# Patient Record
Sex: FEMALE | Race: WHITE | NOT HISPANIC OR LATINO | Employment: OTHER | URBAN - METROPOLITAN AREA
[De-identification: names, ages, dates, MRNs, and addresses within clinical notes are randomized per-mention and may not be internally consistent; named-entity substitution may affect disease eponyms.]

---

## 2021-08-18 LAB
LEFT EYE DIABETIC RETINOPATHY: NORMAL
RIGHT EYE DIABETIC RETINOPATHY: NORMAL

## 2022-03-24 ENCOUNTER — OFFICE VISIT (OUTPATIENT)
Dept: FAMILY MEDICINE CLINIC | Facility: CLINIC | Age: 62
End: 2022-03-24
Payer: COMMERCIAL

## 2022-03-24 VITALS
OXYGEN SATURATION: 98 % | SYSTOLIC BLOOD PRESSURE: 110 MMHG | BODY MASS INDEX: 28.21 KG/M2 | TEMPERATURE: 97.7 F | DIASTOLIC BLOOD PRESSURE: 76 MMHG | HEIGHT: 63 IN | WEIGHT: 159.2 LBS | HEART RATE: 101 BPM | RESPIRATION RATE: 16 BRPM

## 2022-03-24 DIAGNOSIS — E11.42 TYPE 2 DIABETES MELLITUS WITH DIABETIC POLYNEUROPATHY, WITHOUT LONG-TERM CURRENT USE OF INSULIN (HCC): ICD-10-CM

## 2022-03-24 DIAGNOSIS — Z00.00 ENCOUNTER FOR WELL ADULT EXAM WITHOUT ABNORMAL FINDINGS: Primary | ICD-10-CM

## 2022-03-24 PROBLEM — Z84.89: Status: ACTIVE | Noted: 2022-03-24

## 2022-03-24 PROBLEM — Z01.818 PRE-OP TESTING: Status: ACTIVE | Noted: 2021-12-20

## 2022-03-24 PROBLEM — K31.84 GASTROPARESIS: Status: ACTIVE | Noted: 2021-12-20

## 2022-03-24 PROBLEM — M48.061 LUMBAR STENOSIS: Status: ACTIVE | Noted: 2021-12-29

## 2022-03-24 PROBLEM — E11.9 DIABETES MELLITUS (HCC): Status: ACTIVE | Noted: 2022-03-24

## 2022-03-24 PROBLEM — Z87.898 HISTORY OF SEIZURE: Status: ACTIVE | Noted: 2022-03-24

## 2022-03-24 PROBLEM — I10 HYPERTENSION: Status: ACTIVE | Noted: 2022-03-24

## 2022-03-24 PROBLEM — E78.5 HYPERLIPIDEMIA: Status: ACTIVE | Noted: 2022-03-24

## 2022-03-24 PROBLEM — M96.1 CERVICAL POST-LAMINECTOMY SYNDROME: Status: ACTIVE | Noted: 2021-12-20

## 2022-03-24 PROBLEM — J45.909 ASTHMA: Status: ACTIVE | Noted: 2022-03-24

## 2022-03-24 PROCEDURE — 3008F BODY MASS INDEX DOCD: CPT | Performed by: FAMILY MEDICINE

## 2022-03-24 PROCEDURE — 3725F SCREEN DEPRESSION PERFORMED: CPT | Performed by: FAMILY MEDICINE

## 2022-03-24 PROCEDURE — 99386 PREV VISIT NEW AGE 40-64: CPT | Performed by: FAMILY MEDICINE

## 2022-03-24 PROCEDURE — 1036F TOBACCO NON-USER: CPT | Performed by: FAMILY MEDICINE

## 2022-03-24 RX ORDER — OMEPRAZOLE 40 MG/1
CAPSULE, DELAYED RELEASE ORAL
COMMUNITY
Start: 2022-03-03

## 2022-03-24 RX ORDER — GABAPENTIN 300 MG/1
300 CAPSULE ORAL 3 TIMES DAILY
COMMUNITY

## 2022-03-24 RX ORDER — EPINEPHRINE 0.3 MG/.3ML
1 INJECTION SUBCUTANEOUS
COMMUNITY

## 2022-03-24 RX ORDER — LISINOPRIL 20 MG/1
20 TABLET ORAL DAILY
COMMUNITY
Start: 2021-11-12

## 2022-03-24 RX ORDER — ATORVASTATIN CALCIUM 40 MG/1
40 TABLET, FILM COATED ORAL DAILY
COMMUNITY
Start: 2022-03-17

## 2022-03-24 RX ORDER — TIZANIDINE 2 MG/1
2 TABLET ORAL DAILY
COMMUNITY
Start: 2021-11-29

## 2022-03-24 RX ORDER — SITAGLIPTIN 100 MG/1
100 TABLET, FILM COATED ORAL DAILY
COMMUNITY
Start: 2022-02-08 | End: 2022-06-24 | Stop reason: SDUPTHER

## 2022-03-24 RX ORDER — GLIMEPIRIDE 4 MG/1
4 TABLET ORAL 2 TIMES DAILY
COMMUNITY
End: 2022-06-24 | Stop reason: SDUPTHER

## 2022-03-24 RX ORDER — ALBUTEROL SULFATE 90 UG/1
AEROSOL, METERED RESPIRATORY (INHALATION)
COMMUNITY

## 2022-03-24 RX ORDER — CELECOXIB 200 MG/1
200 CAPSULE ORAL DAILY
COMMUNITY
Start: 2022-03-17

## 2022-03-24 RX ORDER — ASPIRIN 81 MG/1
TABLET ORAL
COMMUNITY

## 2022-03-24 NOTE — PROGRESS NOTES
Assessment/Plan:     Diagnoses and all orders for this visit:    Encounter for well adult exam without abnormal findings    Type 2 diabetes mellitus with diabetic polyneuropathy, without long-term current use of insulin ([facility]  -     Ambulatory Referral to Endocrinology; Future    Other orders  -     Multiple Vitamin (MULTIVITAMIN ADULT PO); Take 1 tablet by mouth daily  -     Rhubarb (ESTROVEN MENOPAUSE RELIEF PO); Take 1 tablet by mouth daily  -     TURMERIC PO; Take 500 mg by mouth daily  -     albuterol (PROVENTIL HFA,VENTOLIN HFA) 90 mcg/act inhaler; albuterol sulfate HFA 90 mcg/actuation aerosol inhaler  -     aspirin (ECOTRIN LOW STRENGTH) 81 mg EC tablet; Aspir-Low 81 mg tablet,delayed release   Take 1 tablet every day by oral route  -     atorvastatin (LIPITOR) 40 mg tablet; Take 40 mg by mouth daily    -     celecoxib (CeleBREX) 200 mg capsule; Take 200 mg by mouth daily Take with food  -     Dapagliflozin Propanediol (Farxiga) 10 MG TABS; Take 10 mg by mouth daily  -     EPINEPHrine (EPIPEN) 0 3 mg/0 3 mL SOAJ; 1 Syringe  -     gabapentin (NEURONTIN) 300 mg capsule; Take 300 mg by mouth 3 (three) times a day    -     glimepiride (AMARYL) 4 mg tablet; glimepiride 4 mg tablet  -     lisinopril (ZESTRIL) 20 mg tablet; Take 20 mg by mouth daily  -     metFORMIN (GLUCOPHAGE) 1000 MG tablet; Take 1,000 mg by mouth 2 (two) times a day with meals    -     omeprazole (PriLOSEC) 40 MG capsule  -     Januvia 100 MG tablet; Take 100 mg by mouth daily  -     tiZANidine (ZANAFLEX) 2 mg tablet; Take 2 mg by mouth daily      patient referred endocrinology   She will follow-up with other specialist   Will need to call [facility] to get her blood work she will continue her current medications  She can follow up in 6 months or sooner if needed      Subjective:     Chief Complaint   Patient presents with   [facility] patient check up        Patient ID: Víctor Hernandez is a 59 y o  female      HPI    The following portions of the patient's history were reviewed and updated as appropriate: allergies, current medications, past family history, past medical history, past social history, past surgical history and problem list     Review of Systems   Constitutional: Negative  HENT: Negative  Eyes: Negative  Respiratory: Negative  Cardiovascular: Negative  Gastrointestinal: Negative  Endocrine: Negative  Genitourinary: Negative  Musculoskeletal: Negative  Skin: Negative  Allergic/Immunologic: Negative  Neurological: Negative  Hematological: Negative  Psychiatric/Behavioral: Negative  All other systems reviewed and are negative  Objective:    Vitals:    03/24/22 1444   BP: 110/76   BP Location: Left arm   Patient Position: Sitting   Cuff Size: Large   Pulse: 101   Resp: 16   Temp: 97 7 °F (36 5 °C)   TempSrc: Tympanic   SpO2: 98%   Weight: 72 2 kg (159 lb 3 2 oz)   Height: 5' 3" (1 6 m)          Physical Exam  Vitals and nursing note reviewed  Constitutional:       Appearance: She is well-developed  HENT:      Head: Normocephalic and atraumatic  Right Ear: External ear normal       Left Ear: External ear normal    Eyes:      Conjunctiva/sclera: Conjunctivae normal       Pupils: Pupils are equal, round, and reactive to light  Cardiovascular:      Rate and Rhythm: Normal rate and regular rhythm  Pulses: no weak pulses          Dorsalis pedis pulses are 2+ on the right side and 2+ on the left side  Posterior tibial pulses are 2+ on the right side and 2+ on the left side  Heart sounds: Normal heart sounds  Pulmonary:      Effort: Pulmonary effort is normal       Breath sounds: Normal breath sounds  Abdominal:      General: Bowel sounds are normal       Palpations: Abdomen is soft  Musculoskeletal:         General: Normal range of motion  Cervical back: Normal range of motion     Feet:      Right foot:      Skin integrity: No ulcer, skin breakdown, erythema, warmth, callus or dry skin  Left foot:      Skin integrity: No ulcer, skin breakdown, erythema, warmth, callus or dry skin  Skin:     General: Skin is warm and dry  Neurological:      Mental Status: She is alert and oriented to person, place, and time  Deep Tendon Reflexes: Reflexes are normal and symmetric  Psychiatric:         Behavior: Behavior normal          Thought Content: Thought content normal          Judgment: Judgment normal        Diabetic Foot Exam    Patient's shoes and socks removed  Right Foot/Ankle   Right Foot Inspection  Skin Exam: skin normal  Skin not intact, no dry skin, no warmth, no callus, no erythema, no maceration, no abnormal color, no pre-ulcer, no ulcer and no callus  Toe Exam: ROM and strength within normal limits  Sensory   Vibration: intact  Proprioception: intact  Monofilament testing: intact    Vascular  Capillary refills: < 3 seconds  The right DP pulse is 2+  The right PT pulse is 2+  Left Foot/Ankle  Left Foot Inspection  Skin Exam: skin normal  Skin not intact, no dry skin, no warmth, no erythema, no maceration, normal color, no pre-ulcer, no ulcer and no callus  Toe Exam: ROM and strength within normal limits  Sensory   Vibration: intact  Proprioception: intact  Monofilament testing: intact    Vascular  Capillary refills: < 3 seconds  The left DP pulse is 2+  The left PT pulse is 2+  Assign Risk Category  No deformity present  No loss of protective sensation  No weak pulses  Risk: 0    BMI Counseling: Body mass index is 28 2 kg/m²   The BMI is above normal  Nutrition recommendations include reducing portion sizes, decreasing overall calorie intake, 3-5 servings of fruits/vegetables daily, reducing fast food intake, consuming healthier snacks, decreasing soda and/or juice intake, moderation in carbohydrate intake, increasing intake of lean protein, reducing intake of saturated fat and trans fat and reducing intake of cholesterol  Exercise recommendations include exercising 3-5 times per week

## 2022-03-24 NOTE — PATIENT INSTRUCTIONS
Low Fat Diet   AMBULATORY CARE:   A low-fat diet  is an eating plan that is low in total fat, unhealthy fat, and cholesterol  You may need to follow a low-fat diet if you have trouble digesting or absorbing fat  You may also need to follow this diet if you have high cholesterol  You can also lower your cholesterol by increasing the amount of fiber in your diet  Soluble fiber is a type of fiber that helps to decrease cholesterol levels  Different types of fat in food:   · Limit unhealthy fats  A diet that is high in cholesterol, saturated fat, and trans fat may cause unhealthy cholesterol levels  Unhealthy cholesterol levels increase your risk of heart disease  ? Cholesterol:  Limit intake of cholesterol to less than 200 mg per day  Cholesterol is found in meat, eggs, and dairy  ? Saturated fat:  Limit saturated fat to less than 7% of your total daily calories  Ask your dietitian how many calories you need each day  Saturated fat is found in butter, cheese, ice cream, whole milk, and palm oil  Saturated fat is also found in meat, such as beef, pork, chicken skin, and processed meats  Processed meats include sausage, hot dogs, and bologna  ? Trans fat:  Avoid trans fat as much as possible  Trans fat is used in fried and baked foods  Foods that say trans fat free on the label may still have up to 0 5 grams of trans fat per serving  · Include healthy fats  Replace foods that are high in saturated and trans fat with foods high in healthy fats  This may help to decrease high cholesterol levels  ? Monounsaturated fats: These are found in avocados, nuts, and vegetable oils, such as olive, canola, and sunflower oil  ? Polyunsaturated fats: These can be found in vegetable oils, such as soybean or corn oil  Omega-3 fats can help to decrease the risk of heart disease  Omega-3 fats are found in fish, such as salmon, herring, trout, and tuna   Omega-3 fats can also be found in plant foods, such as walnuts, flaxseed, soybeans, and canola oil  Foods to limit or avoid:   · Grains:      ? Snacks that are made with partially hydrogenated oils, such as chips, regular crackers, and butter-flavored popcorn    ? High-fat baked goods, such as biscuits, croissants, doughnuts, pies, cookies, and pastries    · Dairy:      ? Whole milk, 2% milk, and yogurt and ice cream made with whole milk    ? Half and half creamer, heavy cream, and whipping cream    ? Cheese, cream cheese, and sour cream    · Meats and proteins:      ? High-fat cuts of meat (T-bone steak, regular hamburger, and ribs)    ? Fried meat, poultry (turkey and chicken), and fish    ? Poultry (chicken and turkey) with skin    ? Cold cuts (salami or bologna), hot dogs, mayfield, and sausage    ? Whole eggs and egg yolks    · Vegetables and fruits with added fat:      ? Fried vegetables or vegetables in butter or high-fat sauces, such as cream or cheese sauces    ? Fried fruit or fruit served with butter or cream    · Fats:      ? Butter, stick margarine, and shortening    ? Coconut, palm oil, and palm kernel oil    Foods to include:   · Grains:      ? Whole-grain breads, cereals, pasta, and brown rice    ? Low-fat crackers and pretzels    · Vegetables and fruits:      ? Fresh, frozen, or canned vegetables (no salt or low-sodium)    ? Fresh, frozen, dried, or canned fruit (canned in light syrup or fruit juice)    ? Avocado    · Low-fat dairy products:      ? Nonfat (skim) or 1% milk    ? Nonfat or low-fat cheese, yogurt, and cottage cheese    · Meats and proteins:      ? Chicken or turkey with no skin    ? Baked or broiled fish    ? Lean beef and pork (loin, round, extra lean hamburger)    ? Beans and peas, unsalted nuts, soy products    ? Egg whites and substitutes    ? Seeds and nuts    · Fats:      ? Unsaturated oil, such as canola, olive, peanut, soybean, or sunflower oil    ? Soft or liquid margarine and vegetable oil spread    ?  Low-fat salad dressing    Other ways to decrease fat:   · Read food labels before you buy foods  Choose foods that have less than 30% of calories from fat  Choose low-fat or fat-free dairy products  Remember that fat free does not mean calorie free  These foods still contain calories, and too many calories can lead to weight gain  · Trim fat from meat and avoid fried food  Trim all visible fat from meat before you cook it  Remove the skin from poultry  Do not kate meat, fish, or poultry  Bake, roast, boil, or broil these foods instead  Avoid fried foods  Eat a baked potato instead of Western Radha fries  Steam vegetables instead of sautéing them in butter  · Add less fat to foods  Use imitation mayfield bits on salads and baked potatoes instead of regular mayfield bits  Use fat-free or low-fat salad dressings instead of regular dressings  Use low-fat or nonfat butter-flavored topping instead of regular butter or margarine on popcorn and other foods  Ways to decrease fat in recipes:  Replace high-fat ingredients with low-fat or nonfat ones  This may cause baked goods to be drier than usual  You may need to use nonfat cooking spray on pans to prevent food from sticking  You also may need to change the amount of other ingredients, such as water, in the recipe  Try the following:  · Use low-fat or light margarine instead of regular margarine or shortening  · Use lean ground turkey breast or chicken, or lean ground beef (less than 5% fat) instead of hamburger  · Add 1 teaspoon of canola oil to 8 ounces of skim milk instead of using cream or half and half  · Use grated zucchini, carrots, or apples in breads instead of coconut  · Use blenderized, low-fat cottage cheese, plain tofu, or low-fat ricotta cheese instead of cream cheese  · Use 1 egg white and 1 teaspoon of canola oil, or use ¼ cup (2 ounces) of fat-free egg substitute instead of a whole egg       · Replace half of the oil that is called for in a recipe with applesauce when you bake  Use 3 tablespoons of cocoa powder and 1 tablespoon of canola oil instead of a square of baking chocolate  How to increase fiber:  Eat enough high-fiber foods to get 20 to 30 grams of fiber every day  Slowly increase your fiber intake to avoid stomach cramps, gas, and other problems  · Eat 3 ounces of whole-grain foods each day  An ounce is about 1 slice of bread  Eat whole-grain breads, such as whole-wheat bread  Whole wheat, whole-wheat flour, or other whole grains should be listed as the first ingredient on the food label  Replace white flour with whole-grain flour or use half of each in recipes  Whole-grain flour is heavier than white flour, so you may have to add more yeast or baking powder  · Eat a high-fiber cereal for breakfast   Oatmeal is a good source of soluble fiber  Look for cereals that have bran or fiber in the name  Choose whole-grain products, such as brown rice, barley, and whole-wheat pasta  · Eat more beans, peas, and lentils  For example, add beans to soups or salads  Eat at least 5 cups of fruits and vegetables each day  Eat fruits and vegetables with the peel because the peel is high in fiber  © Copyright [employer] 2022 Information is for End User's use only and may not be sold, redistributed or otherwise used for commercial purposes  All illustrations and images included in CareNotes® are the copyrighted property of A D A M , Inc  or [facility] Gilberto Dacosta   The above information is an  only  It is not intended as medical advice for individual conditions or treatments  Talk to your doctor, nurse or pharmacist before following any medical regimen to see if it is safe and effective for you

## 2022-03-25 ENCOUNTER — TELEPHONE (OUTPATIENT)
Dept: ADMINISTRATIVE | Facility: OTHER | Age: 62
End: 2022-03-25

## 2022-03-25 NOTE — TELEPHONE ENCOUNTER
----- Message from Amarilis Sandoval DO sent at 3/24/2022  2:53 PM EDT -----  Regarding: screenings  New patient     [facility]   Either [place] or [facility] -- near [place]   Dr Victor Hugo villalobos or ean

## 2022-03-25 NOTE — TELEPHONE ENCOUNTER
Upon review of the In Basket request we were able to locate, review, and update the patient chart as requested for Diabetic Eye Exam     Any additional questions or concerns should be emailed to the Practice Liaisons via [email or web address] email, please do not reply via In Basket      Thank you  Christina Dunne

## 2022-03-25 NOTE — LETTER
Procedure Request Form: Mammogram      Date Requested: 22  Patient: Jo Pack  Patient : 1960   Referring Provider: Jean Carlos Aranae, DO        Date of Procedure ______________________________       The above patient has informed us that they have completed their   most recent Mammogram at your facility  Please complete   this form and attach all corresponding procedure reports/results  Comments __________________________________________________________  ____________________________________________________________________  ____________________________________________________________________  ____________________________________________________________________    Facility Completing Procedure _________________________________________    Form Completed By (print name) _______________________________________      Signature __________________________________________________________      These reports are needed for  compliance  Please fax this completed form and a copy of the procedure report to our office located at [place] [address] as soon as possible to [phone] attention Daisy Tay: Phone [phone]    We thank you for your assistance in treating our mutual patient

## 2022-03-25 NOTE — TELEPHONE ENCOUNTER
Upon review of the In Basket request and the patient's chart, initial outreach has been made via telephone call and fax, please see Contacts section for details       Thank you  Errol So

## 2022-03-25 NOTE — TELEPHONE ENCOUNTER
----- Message from Tsering Shoemaker DO sent at 3/24/2022  2:58 PM EDT -----  Regarding: dm eye  Eye to eye  In [place] is eye doctor   And patient states up to date  Was there end of last year

## 2022-03-25 NOTE — LETTER
Procedure Request Form: Colonoscopy      Date Requested: 22  Patient: Ania Wakefieldk  Patient : 1960   Referring Provider: Sherwin Grier, DO        Date of Procedure ______________________________       The above patient has informed us that they have completed their   most recent Colonoscopy at your facility  Please complete   this form and attach all corresponding procedure reports/results  Comments __________________________________________________________  ____________________________________________________________________  ____________________________________________________________________  ____________________________________________________________________    Facility Completing Procedure _________________________________________    Form Completed By (print name) _______________________________________      Signature __________________________________________________________      These reports are needed for  compliance  Please fax this completed form and a copy of the procedure report to our office located at [place] [address] as soon as possible to [phone] stacy Lawson: Phone [phone]    We thank you for your assistance in treating our mutual patient

## 2022-03-28 NOTE — TELEPHONE ENCOUNTER
Upon review of the In Basket request we were able to locate, review, and update the patient chart as requested for Mammogram     Any additional questions or concerns should be emailed to the Practice Liaisons via [email or web address] email, please do not reply via In Basket      Thank you  Abebe Londono

## 2022-03-29 NOTE — TELEPHONE ENCOUNTER
Upon review of the In Basket request we were able to locate, review, and update the patient chart as requested for CRC: Colonoscopy  Any additional questions or concerns should be emailed to the Practice Liaisons via [email or web address] email, please do not reply via In Basket      Thank you  Lexie Kearns

## 2022-03-29 NOTE — TELEPHONE ENCOUNTER
As a follow-up, a second attempt has been made for outreach via fax, please see Contacts section for details      Thank you  Will Yuan

## 2022-04-25 ENCOUNTER — OFFICE VISIT (OUTPATIENT)
Dept: URGENT CARE | Facility: CLINIC | Age: 62
End: 2022-04-25
Payer: COMMERCIAL

## 2022-04-25 VITALS
TEMPERATURE: 97.9 F | OXYGEN SATURATION: 99 % | HEART RATE: 100 BPM | BODY MASS INDEX: 27.64 KG/M2 | RESPIRATION RATE: 18 BRPM | WEIGHT: 156 LBS | HEIGHT: 63 IN

## 2022-04-25 DIAGNOSIS — J20.9 ACUTE BRONCHITIS, UNSPECIFIED ORGANISM: ICD-10-CM

## 2022-04-25 DIAGNOSIS — J01.90 ACUTE NON-RECURRENT SINUSITIS, UNSPECIFIED LOCATION: Primary | ICD-10-CM

## 2022-04-25 PROCEDURE — G0382 LEV 3 HOSP TYPE B ED VISIT: HCPCS | Performed by: PHYSICIAN ASSISTANT

## 2022-04-25 RX ORDER — BENZONATATE 200 MG/1
200 CAPSULE ORAL 3 TIMES DAILY PRN
Qty: 20 CAPSULE | Refills: 0 | Status: SHIPPED | OUTPATIENT
Start: 2022-04-25 | End: 2022-06-24

## 2022-04-25 RX ORDER — DOXYCYCLINE 100 MG/1
100 CAPSULE ORAL 2 TIMES DAILY
Qty: 14 CAPSULE | Refills: 0 | Status: SHIPPED | OUTPATIENT
Start: 2022-04-25 | End: 2022-05-02

## 2022-04-25 RX ORDER — FLUTICASONE PROPIONATE 50 MCG
2 SPRAY, SUSPENSION (ML) NASAL DAILY
Qty: 16 G | Refills: 0 | Status: SHIPPED | OUTPATIENT
Start: 2022-04-25 | End: 2022-06-24

## 2022-04-25 NOTE — PATIENT INSTRUCTIONS
Sinusitis, Ambulatory Care   GENERAL INFORMATION:   Sinusitis  is inflammation or infection of your sinuses  It is most often caused by a virus  Acute sinusitis may last up to 12 weeks  Chronic sinusitis lasts longer than 12 weeks  Recurrent sinusitis is when you have 3 or more episodes of sinusitis in 1 year  Common symptoms include the following:   · Fever    · Pain, pressure, redness, or swelling around the forehead, cheeks, or eyes    · Thick yellow or green discharge from your nose    · Tenderness when you touch your face over your sinuses    · Dry cough that happens mostly at night or when you lie down    · Headache and face pain that is worse when you lean forward    · Teeth pain or pain when you chew  Seek immediate care for the following symptoms:   · Vision changes such as double vision    · Confusion or trouble thinking clearly    · Headache and stiff neck    · Trouble breathing  Treatment for sinusitis  may include medicines to relieve nasal and sinus congestion or to decrease pain and fever  Ask your healthcare provider which medicines you should take and how much is safe  Manage sinusitis:   · Drink liquids as directed  Ask your healthcare provider how much liquid to drink each day and which liquids are best for you  Liquids will help loosen and drain the mucus in your sinuses  · Breathe in steam   Heat a bowl of water until you see steam  Lean over the bowl and make a tent over your head with a large towel  Breathe deeply for about 20 minutes  Be careful not to get too close to the steam or burn yourself  Do this 3 times a day  You can also breathe deeply when you take a hot shower  · Rinse your sinuses  Use a sinus rinse device to rinse your nasal passages with a saline (salt water) solution  This will help thin the mucus in your nose and rinse away pollen and dirt  It will also help reduce swelling so you can breathe normally  Ask how often to do this       · Use heat on your sinuses  to decrease pain  Apply heat for 15 to 20 minutes every hour for as many days as directed  · Sleep with your head elevated  Place an extra pillow under your head before you go to sleep to help your sinuses drain  · Do not smoke and avoid secondhand smoke  If you smoke, it is never too late to quit  Ask for information about how to stop smoking if you need help  Prevent the spread of germs that cause sinusitis:  Wash your hands often with soap and water  Wash your hands after you use the bathroom, change a child's diaper, or sneeze  Wash your hands before you prepare or eat food  Follow up with your healthcare provider as directed:  Write down your questions so you remember to ask them during your visits  CARE AGREEMENT:   You have the right to help plan your care  Learn about your health condition and how it may be treated  Discuss treatment options with your caregivers to decide what care you want to receive  You always have the right to refuse treatment  The above information is an  only  It is not intended as medical advice for individual conditions or treatments  Talk to your doctor, nurse or pharmacist before following any medical regimen to see if it is safe and effective for you  © 2014 [address] is for End User's use only and may not be sold, redistributed or otherwise used for commercial purposes  All illustrations and images included in CareNotes® are the copyrighted property of A D A M , Inc  or Moi Chaudhary  Acute Bronchitis   WHAT YOU NEED TO KNOW:   Acute bronchitis is swelling and irritation in your lungs  It is usually caused by a virus and most often happens in the winter  Bronchitis may also be caused by bacteria or by a chemical irritant, such as smoke  DISCHARGE INSTRUCTIONS:   Return to the emergency department if:   · You cough up blood  · Your lips or fingernails turn blue      · You feel like you are not getting enough air when you breathe  Call your doctor if:   · Your symptoms do not go away or get worse, even after treatment  · Your cough does not get better within 4 weeks  · You have questions or concerns about your condition or care  Medicines: You may  need any of the following:  · Cough suppressants  decrease your urge to cough  · Decongestants  help loosen mucus in your lungs and make it easier to cough up  This can help you breathe easier  · Inhalers  may be given  Your healthcare provider may give you one or more inhalers to help you breathe easier and cough less  An inhaler gives your medicine to open your airways  Ask your healthcare provider to show you how to use your inhaler correctly  · Antibiotics  may be given for up to 5 days if your bronchitis is caused by bacteria  · Acetaminophen  decreases pain and fever  It is available without a doctor's order  Ask how much to take and how often to take it  Follow directions  Read the labels of all other medicines you are using to see if they also contain acetaminophen, or ask your doctor or pharmacist  Acetaminophen can cause liver damage if not taken correctly  Do not use more than 4 grams (4,000 milligrams) total of acetaminophen in one day  · NSAIDs  help decrease swelling and pain or fever  This medicine is available with or without a doctor's order  NSAIDs can cause stomach bleeding or kidney problems in certain people  If you take blood thinner medicine, always ask your healthcare provider if NSAIDs are safe for you  Always read the medicine label and follow directions  · Take your medicine as directed  Contact your healthcare provider if you think your medicine is not helping or if you have side effects  Tell him of her if you are allergic to any medicine  Keep a list of the medicines, vitamins, and herbs you take  Include the amounts, and when and why you take them   Bring the list or the pill bottles to follow-up visits  Carry your medicine list with you in case of an emergency  Self-care:   · Drink liquids as directed  You may need to drink more liquids than usual to stay hydrated  Ask how much liquid to drink each day and which liquids are best for you  · Use a cool mist humidifier  to increase air moisture in your home  This may make it easier for you to breathe and help decrease your cough  · Get more rest   Rest helps your body to heal  Slowly start to do more each day  Rest when you feel it is needed  · Avoid irritants in the air  Avoid chemicals, fumes, and dust  Wear a face mask if you must work around dust or fumes  Stay inside on days when air pollution levels are high  If you have allergies, stay inside when pollen counts are high  Do not use aerosol products, such as spray-on deodorant, bug spray, and hair spray  · Do not smoke or be around others who are smoking  Nicotine and other chemicals in cigarettes and cigars can cause lung damage  Ask your healthcare provider for information if you currently smoke and need help to quit  E-cigarettes or smokeless tobacco still contain nicotine  Talk to your healthcare provider before you use these products  Prevent acute bronchitis:       · Ask about vaccines you may need  Get a flu vaccine each year as soon as recommended, usually in September or October  Ask your healthcare provider if you should also get a pneumonia or COVID-19 vaccine  Your healthcare provider can tell you if you should also get other vaccines, and when to get them  · Prevent the spread of germs  You can decrease your risk for acute bronchitis and other illnesses by doing the following:     ? Wash your hands often with soap and water  Carry germ-killing hand lotion or gel with you  You can use the lotion or gel to clean your hands when soap and water are not available  ? Do not touch your eyes, nose, or mouth unless you have washed your hands first     ?  Always cover your mouth when you cough to prevent the spread of germs  It is best to cough into a tissue or your shirt sleeve instead of into your hand  Ask those around you to cover their mouths when they cough  ? Try to avoid people who have a cold or the flu  If you are sick, stay away from others as much as possible  Follow up with your doctor as directed:  Write down questions you have so you will remember to ask them during your follow-up visits  © Copyright [employer] 2022 Information is for End User's use only and may not be sold, redistributed or otherwise used for commercial purposes  All illustrations and images included in CareNotes® are the copyrighted property of A D A M , Inc  or [facility] Gilberto Dacosta   The above information is an  only  It is not intended as medical advice for individual conditions or treatments  Talk to your doctor, nurse or pharmacist before following any medical regimen to see if it is safe and effective for you

## 2022-05-08 NOTE — PROGRESS NOTES
[facility]        NAME: Eliseo Danielson is a 57 y o  female  : 1960    MRN: [id number]  DATE:  2022  TIME: 8:59 AM    Assessment and Plan   Acute non-recurrent sinusitis, unspecified location [J01 90]  1  Acute non-recurrent sinusitis, unspecified location  doxycycline monohydrate (MONODOX) 100 mg capsule    fluticasone (FLONASE) 50 mcg/act nasal spray   2  Acute bronchitis, unspecified organism  doxycycline monohydrate (MONODOX) 100 mg capsule    benzonatate (TESSALON) 200 MG capsule         Patient Instructions     Patient has sinusitis and bronchitis which I will treat with a combination of doxycycline, Tessalon Perles, and Flonase and recommend hydration, rest, discussed OTC cough and cold meds, close observation  Follow up with PCP in 3-5 days  Proceed to  ER if symptoms worsen  Chief Complaint     Chief Complaint   Patient presents with    Cold Like Symptoms     Pt reports cold like symptom, productive cough and sore throat for approx 2 weeks  History of Present Illness       Patient presents with 2 week history of nasal congestion, PND, productive cough, sore throat, chest congestion  Denies fever, chills, shortness breath or wheezing, N/V/D, or recent no direct cold exposure  Has been managing symptoms with multiple over-the-counter preparations without relief  Review of Systems   Review of Systems   Constitutional: Negative  HENT: Positive for congestion, postnasal drip and sore throat  Respiratory: Positive for cough  Negative for shortness of breath and wheezing  Cardiovascular: Negative  Gastrointestinal: Negative  Genitourinary: Negative            Current Medications       Current Outpatient Medications:     albuterol (PROVENTIL HFA,VENTOLIN HFA) 90 mcg/act inhaler, albuterol sulfate HFA 90 mcg/actuation aerosol inhaler, Disp: , Rfl:     atorvastatin (LIPITOR) 40 mg tablet, Take 40 mg by mouth daily  , Disp: , Rfl:     celecoxib (CeleBREX) 200 mg capsule, Take 200 mg by mouth daily Take with food, Disp: , Rfl:     Dapagliflozin Propanediol (Farxiga) 10 MG TABS, Take 10 mg by mouth daily, Disp: , Rfl:     EPINEPHrine (EPIPEN) 0 3 mg/0 3 mL SOAJ, 1 Syringe, Disp: , Rfl:     gabapentin (NEURONTIN) 300 mg capsule, Take 300 mg by mouth 3 (three) times a day  , Disp: , Rfl:     glimepiride (AMARYL) 4 mg tablet, glimepiride 4 mg tablet, Disp: , Rfl:     Januvia 100 MG tablet, Take 100 mg by mouth daily, Disp: , Rfl:     lisinopril (ZESTRIL) 20 mg tablet, Take 20 mg by mouth daily, Disp: , Rfl:     metFORMIN (GLUCOPHAGE) 1000 MG tablet, Take 1,000 mg by mouth 2 (two) times a day with meals  , Disp: , Rfl:     Multiple Vitamin (MULTIVITAMIN ADULT PO), Take 1 tablet by mouth daily, Disp: , Rfl:     omeprazole (PriLOSEC) 40 MG capsule, , Disp: , Rfl:     Rhubarb (ESTROVEN MENOPAUSE RELIEF PO), Take 1 tablet by mouth daily, Disp: , Rfl:     TURMERIC PO, Take 500 mg by mouth daily, Disp: , Rfl:     aspirin (ECOTRIN LOW STRENGTH) 81 mg EC tablet, Aspir-Low 81 mg tablet,delayed release  Take 1 tablet every day by oral route   (Patient not taking: Reported on 4/25/2022), Disp: , Rfl:     benzonatate (TESSALON) 200 MG capsule, Take 1 capsule (200 mg total) by mouth 3 (three) times a day as needed for cough, Disp: 20 capsule, Rfl: 0    fluticasone (FLONASE) 50 mcg/act nasal spray, 2 sprays into each nostril daily, Disp: 16 g, Rfl: 0    tiZANidine (ZANAFLEX) 2 mg tablet, Take 2 mg by mouth daily (Patient not taking: Reported on 4/25/2022 ), Disp: , Rfl:     Current Allergies     Allergies as of 04/25/2022 - Reviewed 04/25/2022   Allergen Reaction Noted    Shellfish-derivedproducts [shellfish-derived products - food allergy] Anaphylaxis 10/18/2018    Phenytoin Hives 05/29/1985            The following portions of the patient's history were reviewed and updated as appropriate: allergies, current medications, past family history, past medical history, past social history, past surgical history and problem list      History reviewed  No pertinent past medical history  History reviewed  No pertinent surgical history  History reviewed  No pertinent family history  Medications have been verified  Objective   Pulse 100   Temp 97 9 °F (36 6 °C)   Resp 18   Ht 5' 3" (1 6 m)   Wt 70 8 kg (156 lb)   SpO2 99%   BMI 27 63 kg/m²   No LMP recorded  Patient is postmenopausal        Physical Exam     Physical Exam  Vitals reviewed  Constitutional:       General: She is not in acute distress  Appearance: She is well-developed  HENT:      Right Ear: Hearing, tympanic membrane, ear canal and external ear normal       Left Ear: Hearing, tympanic membrane, ear canal and external ear normal       Nose: Mucosal edema (B/L boggy turbinates) and congestion present  Mouth/Throat:      Mouth: Mucous membranes are moist       Pharynx: Posterior oropharyngeal erythema (PND) present  No oropharyngeal exudate  Tonsils: No tonsillar exudate  Cardiovascular:      Rate and Rhythm: Normal rate and regular rhythm  Pulses: Normal pulses  Heart sounds: Normal heart sounds  No murmur heard  Pulmonary:      Effort: Pulmonary effort is normal  No respiratory distress  Breath sounds: Rhonchi (Bilateral diffuse coarse breath sounds heard throughout) present  No wheezing  Musculoskeletal:      Cervical back: Neck supple  Lymphadenopathy:      Cervical: No cervical adenopathy  Neurological:      Mental Status: She is alert and oriented to person, place, and time

## 2022-06-24 ENCOUNTER — CONSULT (OUTPATIENT)
Dept: ENDOCRINOLOGY | Facility: HOSPITAL | Age: 62
End: 2022-06-24
Payer: COMMERCIAL

## 2022-06-24 VITALS
HEIGHT: 63 IN | HEART RATE: 100 BPM | DIASTOLIC BLOOD PRESSURE: 64 MMHG | WEIGHT: 159.6 LBS | OXYGEN SATURATION: 98 % | SYSTOLIC BLOOD PRESSURE: 110 MMHG | BODY MASS INDEX: 28.28 KG/M2

## 2022-06-24 DIAGNOSIS — E11.42 TYPE 2 DIABETES MELLITUS WITH DIABETIC POLYNEUROPATHY, WITHOUT LONG-TERM CURRENT USE OF INSULIN (HCC): ICD-10-CM

## 2022-06-24 DIAGNOSIS — I10 HYPERTENSION, UNSPECIFIED TYPE: Primary | ICD-10-CM

## 2022-06-24 DIAGNOSIS — E78.5 HYPERLIPIDEMIA, UNSPECIFIED HYPERLIPIDEMIA TYPE: ICD-10-CM

## 2022-06-24 PROCEDURE — 3008F BODY MASS INDEX DOCD: CPT | Performed by: INTERNAL MEDICINE

## 2022-06-24 PROCEDURE — 99204 OFFICE O/P NEW MOD 45 MIN: CPT | Performed by: INTERNAL MEDICINE

## 2022-06-24 PROCEDURE — 1036F TOBACCO NON-USER: CPT | Performed by: INTERNAL MEDICINE

## 2022-06-24 RX ORDER — BLOOD SUGAR DIAGNOSTIC
STRIP MISCELLANEOUS
Qty: 100 EACH | Refills: 3 | Status: SHIPPED | OUTPATIENT
Start: 2022-06-24

## 2022-06-24 RX ORDER — SITAGLIPTIN 100 MG/1
TABLET, FILM COATED ORAL
Qty: 90 TABLET | Refills: 3 | Status: SHIPPED | OUTPATIENT
Start: 2022-06-24

## 2022-06-24 RX ORDER — GLIMEPIRIDE 4 MG/1
TABLET ORAL
Qty: 180 TABLET | Refills: 3 | Status: SHIPPED | OUTPATIENT
Start: 2022-06-24

## 2022-06-24 NOTE — PROGRESS NOTES
6/24/2022    Assessment/Plan      Diagnoses and all orders for this visit:    Hypertension, unspecified type    Type 2 diabetes mellitus with diabetic polyneuropathy, without long-term current use [facility] ([facility]  -     Ambulatory Referral to Endocrinology  -     Hemoglobin A1C; Future  -     Comprehensive metabolic panel; Future  -     CBC and differential; Future  -     Microalbumin / creatinine urine ratio; Future  -     TSH, 3rd generation; Future    Hyperlipidemia, unspecified hyperlipidemia type  -     Lipid Panel with Direct LDL reflex; Future        Assessment/Plan:  1  Type 2 diabetes:  Most recent A1c is slightly above goal   This may be higher due to prior stressors such as surgery, moving, steroid injection  Blood sugars appear to be doing better now  We will continue current regimen and repeat labs prior to next appointment should be in about 3-4 months  2  Hyperlipidemia: Continue atorvastatin  3  Hypertension:  Continue lisinopril  CC: Diabetes Consult    History of Present Illness     HPI: Flor Hancock is a 57y o  year old female with type 2 diabetes for about 15 years  She is on oral agents at home and takes metformin 1000 mg twice daily, Januvia 100 mg daily, glimepiride 4 mg twice a day, Farxiga 10 mg daily  She denies any polyuria, polydipsia, nocturia and blurry vision  She denies neuropathy, nephropathy, retinopathy and heart attack, but did have tia in the past   In the past she did not tolerate Ozempic  Hypoglycemic episodes: No      The patient's last eye exam was in August 2021  The patient's last foot exam was in March 2022 through pcp  Blood Sugar/Glucometer/Pump/CGM review:  Average blood sugar was 136 with 1 3 readings per day with lowest reading over the the time  A 6/10 through 6/24 being any foreign the highest 177  She is a history of hyperlipidemia and is treated with atorvastatin 40 mg daily      She is a history of hypertension treated with lisinopril 20 mg daily  Review of Systems   Constitutional: Negative for fatigue  HENT: Negative for trouble swallowing and voice change  Eyes: Negative for visual disturbance  Respiratory: Negative for shortness of breath  Cardiovascular: Negative for palpitations and leg swelling  Gastrointestinal: Negative for abdominal pain, nausea and vomiting  Endocrine: Negative for polydipsia and polyuria  Musculoskeletal: Negative for arthralgias and myalgias  Skin: Negative for rash  Neurological: Negative for dizziness, tremors and weakness  Hematological: Negative for adenopathy  Psychiatric/Behavioral: Negative for agitation and confusion  Historical Information   History reviewed  No pertinent past medical history  History reviewed  No pertinent surgical history  Social History   Social History     Substance and Sexual Activity   Alcohol Use Yes    Comment: holidays     Social History     Substance and Sexual Activity   Drug Use Never     Social History     Tobacco Use   Smoking Status Former Smoker   Smokeless Tobacco Never Used   Tobacco Comment     years ago     Family History: History reviewed  No pertinent family history      Meds/Allergies   Current Outpatient Medications   Medication Sig Dispense Refill    albuterol (PROVENTIL HFA,VENTOLIN HFA) 90 mcg/act inhaler albuterol sulfate HFA 90 mcg/actuation aerosol inhaler      atorvastatin (LIPITOR) 40 mg tablet Take 40 mg by mouth daily        celecoxib (CeleBREX) 200 mg capsule Take 200 mg by mouth daily Take with food      Dapagliflozin Propanediol 10 MG TABS Take 10 mg by mouth daily      EPINEPHrine (EPIPEN) 0 3 mg/0 3 mL SOAJ 1 Syringe      gabapentin (NEURONTIN) 300 mg capsule Take 300 mg by mouth 3 (three) times a day        glimepiride (AMARYL) 4 mg tablet Take 4 mg by mouth 2 (two) times a day      Januvia 100 MG tablet Take 100 mg by mouth daily      lisinopril (ZESTRIL) 20 mg tablet Take 20 mg by mouth daily  metFORMIN (GLUCOPHAGE) 1000 MG tablet Take 1,000 mg by mouth 2 (two) times a day with meals        Multiple Vitamin (MULTIVITAMIN ADULT PO) Take 1 tablet by mouth daily      omeprazole (PriLOSEC) 40 MG capsule       Rhubarb (ESTROVEN MENOPAUSE RELIEF PO) Take 1 tablet by mouth daily      TURMERIC PO Take 500 mg by mouth daily      aspirin (ECOTRIN LOW STRENGTH) 81 mg EC tablet Aspir-Low 81 mg tablet,delayed release   Take 1 tablet every day by oral route  (Patient not taking: No sig reported)      tiZANidine (ZANAFLEX) 2 mg tablet Take 2 mg by mouth daily (Patient not taking: No sig reported)       No current facility-administered medications for this visit  Allergies   Allergen Reactions    Shellfish-Derivedproducts [Shellfish-Derived Products - Food Allergy] Anaphylaxis    Phenytoin Hives       Objective   Vitals: Blood pressure 110/64, pulse 100, height 5' 3" (1 6 m), weight 72 4 kg (159 lb 9 6 oz), SpO2 98 %  Invasive Devices  Report    None                 Physical Exam  Vitals reviewed  Constitutional:       General: She is not in acute distress  Appearance: She is well-developed  She is not diaphoretic  HENT:      Head: Normocephalic and atraumatic  Eyes:      Conjunctiva/sclera: Conjunctivae normal       Pupils: Pupils are equal, round, and reactive to light  Neck:      Thyroid: No thyromegaly  Cardiovascular:      Rate and Rhythm: Normal rate and regular rhythm  Pulmonary:      Effort: Pulmonary effort is normal  No respiratory distress  Breath sounds: Normal breath sounds  Abdominal:      General: Bowel sounds are normal       Palpations: Abdomen is soft  Musculoskeletal:         General: Normal range of motion  Cervical back: Normal range of motion and neck supple  Skin:     General: Skin is warm and dry  Findings: No rash  Neurological:      Mental Status: She is alert and oriented to person, place, and time  Motor: No abnormal muscle tone  Psychiatric:         Behavior: Behavior normal          The history was obtained from the review of the chart and from the patient  Lab Results:     Recent labs from [facility] on 05/23/2022:   Glucose 143, BUN 29, creatinine 1 01, GFR 62, sodium 143, potassium 4 5, A1c 7 4, calcium 9 9    Future Appointments   Date Time Provider Tatianna Kline   9/27/2022  9:00 AM DO MARLENI [facility]       Portions of the record may have been created with voice recognition software  Occasional wrong word or "sound a like" substitutions may have occurred due to the inherent limitations of voice recognition software  Read the chart carefully and recognize, using context, where substitutions have occurred

## 2022-09-09 ENCOUNTER — OFFICE VISIT (OUTPATIENT)
Dept: URGENT CARE | Facility: CLINIC | Age: 62
End: 2022-09-09
Payer: COMMERCIAL

## 2022-09-09 VITALS
RESPIRATION RATE: 16 BRPM | SYSTOLIC BLOOD PRESSURE: 126 MMHG | OXYGEN SATURATION: 99 % | DIASTOLIC BLOOD PRESSURE: 78 MMHG | TEMPERATURE: 97.7 F | HEART RATE: 90 BPM

## 2022-09-09 DIAGNOSIS — H00.015 HORDEOLUM EXTERNUM OF LEFT LOWER EYELID: Primary | ICD-10-CM

## 2022-09-09 PROCEDURE — G0382 LEV 3 HOSP TYPE B ED VISIT: HCPCS | Performed by: PHYSICIAN ASSISTANT

## 2022-09-09 RX ORDER — AZELASTINE HYDROCHLORIDE 0.5 MG/ML
1 SOLUTION/ DROPS OPHTHALMIC 2 TIMES DAILY PRN
Qty: 6 ML | Refills: 0 | Status: SHIPPED | OUTPATIENT
Start: 2022-09-09

## 2022-09-09 NOTE — PATIENT INSTRUCTIONS
Stye   WHAT YOU NEED TO KNOW:   A stye is a lump on the edge or inside of your eyelid caused by an infection  A stye can form on your upper or lower eyelid  It usually goes away in 2 to 4 days  DISCHARGE INSTRUCTIONS:   Medicines:   Antibiotic medicine: This is given as an ointment to put into your eye  It is used to fight an infection caused by bacteria  Use as directed  Take your medicine as directed  Contact your healthcare provider if you think your medicine is not helping or if you have side effects  Tell him of her if you are allergic to any medicine  Keep a list of the medicines, vitamins, and herbs you take  Include the amounts, and when and why you take them  Bring the list or the pill bottles to follow-up visits  Carry your medicine list with you in case of an emergency  Follow up with your doctor as directed:  Write down your questions so you remember to ask them during your visits  Self-care:   Use warm compresses: This will help decrease swelling and pain  Wet a clean washcloth with warm water and place it on your eye for 10 to 15 minutes, 3 to 4 times each day or as directed  Keep your hands away from your eye: This helps to prevent the spread of the infection to other parts of the eye  Wash your hands often with soap and dry with a clean towel  Do not squeeze the stye  Do not use eye makeup:  Do not wear eye makeup while you have a stye  Eye makeup may carry bacteria and cause another stye  Throw away eye makeup and brushes used to apply the makeup  Use new eye makeup after the stye has gone away  Do not share eye makeup with others  Prevent another stye:  Wash your face and clean your eyelashes every day  Remove eye makeup with makeup remover  This helps to completely remove eye makeup without heavy rubbing  Contact your healthcare provider if:   You have redness and discharge around your eye, and your eye pain is getting worse  Your vision changes      The stye has not gone away within 7 days  The stye comes back within a short period of time after treatment  You have questions or concerns about your condition or care  © Copyright [employer] 2022 Information is for End User's use only and may not be sold, redistributed or otherwise used for commercial purposes  All illustrations and images included in CareNotes® are the copyrighted property of [employer]  or [facility] Gilberto Dacosta   The above information is an  only  It is not intended as medical advice for individual conditions or treatments  Talk to your doctor, nurse or pharmacist before following any medical regimen to see if it is safe and effective for you

## 2022-09-15 ENCOUNTER — TELEPHONE (OUTPATIENT)
Dept: FAMILY MEDICINE CLINIC | Facility: CLINIC | Age: 62
End: 2022-09-15

## 2022-09-15 NOTE — TELEPHONE ENCOUNTER
Called patient and let her know   She said she will be here
Patient wants to know if she needs a 6 month f/u with  everything is fine and she would like to to wait to see you at her yearly if that's ok
She is diabetic so she needs to come in 6 months plus  she is not gotten her blood work that I ordered 
present and adequate

## 2022-09-16 NOTE — PROGRESS NOTES
[facility]        NAME: Julius Rodríguez is a 57 y o  female  : 1960    MRN: [id number]  DATE:  2022  TIME: 11:08 AM    Assessment and Plan   Hordeolum externum of left lower eyelid [H00 015]  1  Hordeolum externum of left lower eyelid  azelastine (OPTIVAR) 0 05 % ophthalmic solution         Patient Instructions     Patient has stye of the left lower lid  She has some itching and irritation so I prescribed her topical antihistamine drop  Recommended frequent warm compresses  Should be re-evaluated if no significant improvement over the next week  Follow up with PCP in 3-5 days  Proceed to  ER if symptoms worsen  Chief Complaint     Chief Complaint   Patient presents with    Eye Pain     Left eye pain and redness of the lower eye lid  She reports it began last night  Denies drainage  History of Present Illness       Patient presents with irritation, redness, pain left eye and lower lid  She denies any injury or trauma to the area  Denies crusting, discharge, photophobia, visual changes  Denies any symptoms in the right eye  Review of Systems   Review of Systems   Constitutional: Negative  Eyes: Positive for pain, redness and itching  Negative for photophobia, discharge and visual disturbance  Pertinent positives pertain to left eye and lower lid   Respiratory: Negative  Cardiovascular: Negative  Gastrointestinal: Negative  Genitourinary: Negative            Current Medications       Current Outpatient Medications:     albuterol (PROVENTIL HFA,VENTOLIN HFA) 90 mcg/act inhaler, albuterol sulfate HFA 90 mcg/actuation aerosol inhaler, Disp: , Rfl:     atorvastatin (LIPITOR) 40 mg tablet, Take 40 mg by mouth daily  , Disp: , Rfl:     azelastine (OPTIVAR) 0 05 % ophthalmic solution, Administer 1 drop into the left eye 2 (two) times a day as needed (itching), Disp: 6 mL, Rfl: 0    celecoxib (CeleBREX) 200 mg capsule, Take 200 mg by mouth daily Take with food, Disp: , Rfl:     Dapagliflozin Propanediol 10 MG TABS, Take 1 tablet (10 mg total) by mouth daily, Disp: 90 tablet, Rfl: 3    EPINEPHrine (EPIPEN) 0 3 mg/0 3 mL SOAJ, 1 Syringe, Disp: , Rfl:     gabapentin (NEURONTIN) 300 mg capsule, Take 300 mg by mouth 3 (three) times a day  , Disp: , Rfl:     glimepiride (AMARYL) 4 mg tablet, 1 tab bid, Disp: 180 tablet, Rfl: 3    glucose blood (OneTouch Ultra) test strip, Daily  , Disp: 100 each, Rfl: 3    Januvia 100 MG tablet, 1 tab daily, Disp: 90 tablet, Rfl: 3    lisinopril (ZESTRIL) 20 mg tablet, Take 20 mg by mouth daily, Disp: , Rfl:     metFORMIN (GLUCOPHAGE) 1000 MG tablet, Take 1 tablet (1,000 mg total) by mouth 2 (two) times a day with meals, Disp: 180 tablet, Rfl: 3    Multiple Vitamin (MULTIVITAMIN ADULT PO), Take 1 tablet by mouth daily, Disp: , Rfl:     omeprazole (PriLOSEC) 40 MG capsule, , Disp: , Rfl:     Rhubarb (ESTROVEN MENOPAUSE RELIEF PO), Take 1 tablet by mouth daily, Disp: , Rfl:     tiZANidine (ZANAFLEX) 2 mg tablet, Take 2 mg by mouth daily, Disp: , Rfl:     TURMERIC PO, Take 500 mg by mouth daily, Disp: , Rfl:     aspirin (ECOTRIN LOW STRENGTH) 81 mg EC tablet, Aspir-Low 81 mg tablet,delayed release  Take 1 tablet every day by oral route  (Patient not taking: No sig reported), Disp: , Rfl:     Current Allergies     Allergies as of 09/09/2022 - Reviewed 09/09/2022   Allergen Reaction Noted    Shellfish-derivedproducts [shellfish-derived products - food allergy] Anaphylaxis 10/18/2018    Phenytoin Hives 05/29/1985            The following portions of the patient's history were reviewed and updated as appropriate: allergies, current medications, past family history, past medical history, past social history, past surgical history and problem list      History reviewed  No pertinent past medical history  History reviewed  No pertinent surgical history  History reviewed  No pertinent family history        Medications have been verified  Objective   /78   Pulse 90   Temp 97 7 °F (36 5 °C)   Resp 16   SpO2 99%   No LMP recorded  Patient is postmenopausal        Physical Exam     Physical Exam  Vitals reviewed  Constitutional:       General: She is not in acute distress  Appearance: She is well-developed  Eyes:      Comments: Visible nondraining stye left lower lid  Mild conjunctival injection  Exam otherwise benign  Neurological:      Mental Status: She is alert and oriented to person, place, and time

## 2022-09-22 LAB
ALBUMIN SERPL-MCNC: 4.6 G/DL (ref 3.8–4.8)
ALBUMIN/CREAT UR: 27 MG/G CREAT (ref 0–29)
ALBUMIN/GLOB SERPL: 1.8 {RATIO} (ref 1.2–2.2)
ALP SERPL-CCNC: 96 IU/L (ref 44–121)
ALT SERPL-CCNC: 24 IU/L (ref 0–32)
AST SERPL-CCNC: 24 IU/L (ref 0–40)
BASOPHILS # BLD AUTO: 0.1 X10E3/UL (ref 0–0.2)
BASOPHILS NFR BLD AUTO: 1 %
BILIRUB SERPL-MCNC: 0.3 MG/DL (ref 0–1.2)
BUN SERPL-MCNC: 28 MG/DL (ref 8–27)
BUN/CREAT SERPL: 25 (ref 12–28)
CALCIUM SERPL-MCNC: 9.6 MG/DL (ref 8.7–10.3)
CHLORIDE SERPL-SCNC: 104 MMOL/L (ref 96–106)
CHOLEST SERPL-MCNC: 165 MG/DL (ref 100–199)
CO2 SERPL-SCNC: 18 MMOL/L (ref 20–29)
CREAT SERPL-MCNC: 1.12 MG/DL (ref 0.57–1)
CREAT UR-MCNC: 258.3 MG/DL
EGFR: 55 ML/MIN/1.73
EOSINOPHIL # BLD AUTO: 0.4 X10E3/UL (ref 0–0.4)
EOSINOPHIL NFR BLD AUTO: 6 %
ERYTHROCYTE [DISTWIDTH] IN BLOOD BY AUTOMATED COUNT: 12.7 % (ref 11.7–15.4)
GLOBULIN SER-MCNC: 2.5 G/DL (ref 1.5–4.5)
GLUCOSE SERPL-MCNC: 110 MG/DL (ref 65–99)
HBA1C MFR BLD: 7.1 % (ref 4.8–5.6)
HCT VFR BLD AUTO: 38.8 % (ref 34–46.6)
HDLC SERPL-MCNC: 39 MG/DL
HGB BLD-MCNC: 12.5 G/DL (ref 11.1–15.9)
IMM GRANULOCYTES # BLD: 0 X10E3/UL (ref 0–0.1)
IMM GRANULOCYTES NFR BLD: 0 %
LDLC SERPL CALC-MCNC: 80 MG/DL (ref 0–99)
LDLC/HDLC SERPL: 2.1 RATIO (ref 0–3.2)
LYMPHOCYTES # BLD AUTO: 2.2 X10E3/UL (ref 0.7–3.1)
LYMPHOCYTES NFR BLD AUTO: 32 %
MCH RBC QN AUTO: 29.7 PG (ref 26.6–33)
MCHC RBC AUTO-ENTMCNC: 32.2 G/DL (ref 31.5–35.7)
MCV RBC AUTO: 92 FL (ref 79–97)
MICROALBUMIN UR-MCNC: 69.9 UG/ML
MONOCYTES # BLD AUTO: 0.4 X10E3/UL (ref 0.1–0.9)
MONOCYTES NFR BLD AUTO: 6 %
NEUTROPHILS # BLD AUTO: 3.8 X10E3/UL (ref 1.4–7)
NEUTROPHILS NFR BLD AUTO: 55 %
PLATELET # BLD AUTO: 257 X10E3/UL (ref 150–450)
POTASSIUM SERPL-SCNC: 5.1 MMOL/L (ref 3.5–5.2)
PROT SERPL-MCNC: 7.1 G/DL (ref 6–8.5)
RBC # BLD AUTO: 4.21 X10E6/UL (ref 3.77–5.28)
SL AMB VLDL CHOLESTEROL CALC: 46 MG/DL (ref 5–40)
SODIUM SERPL-SCNC: 139 MMOL/L (ref 134–144)
TRIGL SERPL-MCNC: 282 MG/DL (ref 0–149)
TSH SERPL DL<=0.005 MIU/L-ACNC: 2.26 UIU/ML (ref 0.45–4.5)
WBC # BLD AUTO: 6.9 X10E3/UL (ref 3.4–10.8)

## 2022-09-22 PROCEDURE — 3051F HG A1C>EQUAL 7.0%<8.0%: CPT | Performed by: NURSE PRACTITIONER

## 2022-09-22 PROCEDURE — 3060F POS MICROALBUMINURIA REV: CPT | Performed by: NURSE PRACTITIONER

## 2022-09-23 ENCOUNTER — RA CDI HCC (OUTPATIENT)
Dept: OTHER | Facility: HOSPITAL | Age: 62
End: 2022-09-23

## 2022-09-23 RX ORDER — IBUPROFEN 800 MG/1
TABLET ORAL
COMMUNITY
Start: 2022-08-27 | End: 2022-11-10 | Stop reason: HOSPADM

## 2022-09-23 NOTE — PROGRESS NOTES
[facility]  coding opportunities          Chart Reviewed number of suggestions sent to Provider: 1   [id number]    Patients Insurance        Commercial Insurance: [employer]

## 2022-09-27 ENCOUNTER — OFFICE VISIT (OUTPATIENT)
Dept: FAMILY MEDICINE CLINIC | Facility: CLINIC | Age: 62
End: 2022-09-27
Payer: COMMERCIAL

## 2022-09-27 VITALS
BODY MASS INDEX: 28.21 KG/M2 | WEIGHT: 159.2 LBS | OXYGEN SATURATION: 99 % | DIASTOLIC BLOOD PRESSURE: 86 MMHG | TEMPERATURE: 97.8 F | SYSTOLIC BLOOD PRESSURE: 136 MMHG | HEIGHT: 63 IN | RESPIRATION RATE: 16 BRPM | HEART RATE: 90 BPM

## 2022-09-27 DIAGNOSIS — Z01.419 ENCOUNTER FOR GYNECOLOGICAL EXAMINATION WITHOUT ABNORMAL FINDING: ICD-10-CM

## 2022-09-27 DIAGNOSIS — E11.9 TYPE 2 DIABETES MELLITUS WITHOUT COMPLICATION, WITHOUT LONG-TERM CURRENT USE OF INSULIN (HCC): Primary | ICD-10-CM

## 2022-09-27 DIAGNOSIS — Z12.31 ENCOUNTER FOR SCREENING MAMMOGRAM FOR MALIGNANT NEOPLASM OF BREAST: ICD-10-CM

## 2022-09-27 DIAGNOSIS — D68.1 CONGENITAL FACTOR XI DEFICIENCY (HCC): ICD-10-CM

## 2022-09-27 DIAGNOSIS — R06.09 CHRONIC DYSPNEA: ICD-10-CM

## 2022-09-27 DIAGNOSIS — E78.5 HYPERLIPIDEMIA, UNSPECIFIED HYPERLIPIDEMIA TYPE: ICD-10-CM

## 2022-09-27 DIAGNOSIS — I47.1 PAROXYSMAL SUPRAVENTRICULAR TACHYCARDIA (HCC): ICD-10-CM

## 2022-09-27 DIAGNOSIS — M77.8 RIGHT SHOULDER TENDONITIS: ICD-10-CM

## 2022-09-27 DIAGNOSIS — I10 HYPERTENSION, UNSPECIFIED TYPE: ICD-10-CM

## 2022-09-27 PROBLEM — I47.10 PAROXYSMAL SUPRAVENTRICULAR TACHYCARDIA: Status: ACTIVE | Noted: 2019-03-05

## 2022-09-27 PROBLEM — K21.00 GASTRO-ESOPHAGEAL REFLUX DISEASE WITH ESOPHAGITIS: Status: ACTIVE | Noted: 2019-03-05

## 2022-09-27 PROCEDURE — 0503F POSTPARTUM CARE VISIT: CPT | Performed by: FAMILY MEDICINE

## 2022-09-27 PROCEDURE — 99214 OFFICE O/P EST MOD 30 MIN: CPT | Performed by: FAMILY MEDICINE

## 2022-09-27 PROCEDURE — 3725F SCREEN DEPRESSION PERFORMED: CPT | Performed by: FAMILY MEDICINE

## 2022-09-27 NOTE — PROGRESS NOTES
Name: Daniela Parmar      : 1960      MRN: [id number]  Encounter Provider: Monroe Rain DO  Encounter Date: 2022   Encounter department: [address]     1  Type 2 diabetes mellitus without complication, without long-term current use of insulin ([facility] )    2  Hyperlipidemia, unspecified hyperlipidemia type    3  Hypertension, unspecified type    4  Encounter for screening mammogram for malignant neoplasm of breast  -     Mammo screening bilateral w 3d & cad; Future; Expected date: 2022    5  Chronic dyspnea  -     Ambulatory Referral to Pulmonology; Future    6  Encounter for gynecological examination without abnormal finding  -     Ambulatory Referral to Gynecology; Future    7  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[facility] )    8  Paroxysmal supraventricular tachycardia ([facility] )    9  Right shoulder tendonitis  -     Ambulatory Referral to Physical Therapy; Future    Patient referred physical therapy for her shoulder issues  Mammogram ordered   Patient referred to Pulmonary for chronic dyspnea issues she has already had a cardiac workup  She will follow-up with endocrinology for her lab work   Gyn referral given as well as mammogram   She can follow up in 6 months or sooner if needed        Depression Screening and Follow-up Plan: Patient was screened for depression during today's encounter  They screened negative with a PHQ-2 score of 0  Subjective      Patient presents today for six-month checkup chronic conditions   We reviewed her recent blood work her sugars are slightly elevated   Her cholesterol improved   Otherwise she has no acute complaints today    Review of Systems   Constitutional: Negative  HENT: Negative  Eyes: Negative  Respiratory: Negative  Cardiovascular: Negative  Gastrointestinal: Negative  Endocrine: Negative  Genitourinary: Negative  Musculoskeletal: Negative  Skin: Negative  Allergic/Immunologic: Negative  Neurological: Negative  Hematological: Negative  Psychiatric/Behavioral: Negative  All other systems reviewed and are negative  Current Outpatient Medications on File Prior to Visit   Medication Sig    albuterol (PROVENTIL HFA,VENTOLIN HFA) 90 mcg/act inhaler albuterol sulfate HFA 90 mcg/actuation aerosol inhaler    atorvastatin (LIPITOR) 40 mg tablet Take 40 mg by mouth daily      azelastine (OPTIVAR) 0 05 % ophthalmic solution Administer 1 drop into the left eye 2 (two) times a day as needed (itching)    celecoxib (CeleBREX) 200 mg capsule Take 200 mg by mouth daily Take with food    Dapagliflozin Propanediol 10 MG TABS Take 1 tablet (10 mg total) by mouth daily    EPINEPHrine (EPIPEN) 0 3 mg/0 3 mL SOAJ 1 Syringe    gabapentin (NEURONTIN) 300 mg capsule Take 300 mg by mouth 3 (three) times a day      glimepiride (AMARYL) 4 mg tablet 1 tab bid    glucose blood (OneTouch Ultra) test strip Daily   ibuprofen (MOTRIN) 800 mg tablet     Januvia 100 MG tablet 1 tab daily    lisinopril (ZESTRIL) 20 mg tablet Take 20 mg by mouth daily    metFORMIN (GLUCOPHAGE) 1000 MG tablet Take 1 tablet (1,000 mg total) by mouth 2 (two) times a day with meals    Multiple Vitamin (MULTIVITAMIN ADULT PO) Take 1 tablet by mouth daily    omeprazole (PriLOSEC) 40 MG capsule     Rhubarb (ESTROVEN MENOPAUSE RELIEF PO) Take 1 tablet by mouth daily    tiZANidine (ZANAFLEX) 2 mg tablet Take 2 mg by mouth daily    TURMERIC PO Take 500 mg by mouth daily    aspirin (ECOTRIN LOW STRENGTH) 81 mg EC tablet Aspir-Low 81 mg tablet,delayed release   Take 1 tablet every day by oral route  (Patient not taking: No sig reported)       Objective     /86 (BP Location: Left arm, Patient Position: Sitting, Cuff Size: Standard)   Pulse 90   Temp 97 8 °F (36 6 °C)   Resp 16   Ht 5' 3" (1 6 m)   Wt 72 2 kg (159 lb 3 2 oz)   SpO2 99%   BMI 28 20 kg/m²     Physical Exam  Vitals and nursing note reviewed  Constitutional:       Appearance: She is well-developed  HENT:      Head: Normocephalic and atraumatic  Right Ear: External ear normal       Left Ear: External ear normal    Eyes:      Conjunctiva/sclera: Conjunctivae normal       Pupils: Pupils are equal, round, and reactive to light  Cardiovascular:      Rate and Rhythm: Normal rate and regular rhythm  Heart sounds: Normal heart sounds  Pulmonary:      Effort: Pulmonary effort is normal       Breath sounds: Normal breath sounds  Abdominal:      General: Bowel sounds are normal       Palpations: Abdomen is soft  Musculoskeletal:         General: Normal range of motion  Cervical back: Normal range of motion  Skin:     General: Skin is warm and dry  Neurological:      Mental Status: She is alert and oriented to person, place, and time  Deep Tendon Reflexes: Reflexes are normal and symmetric  Psychiatric:         Behavior: Behavior normal          Thought Content: Thought content normal          Judgment: Judgment normal        Urbano Page, DO  BMI Counseling: Body mass index is 28 2 kg/m²  The BMI is above normal  Nutrition recommendations include reducing portion sizes, decreasing overall calorie intake, 3-5 servings of fruits/vegetables daily, reducing fast food intake, consuming healthier snacks, decreasing soda and/or juice intake, moderation in carbohydrate intake, increasing intake of lean protein, reducing intake of saturated fat and trans fat and reducing intake of cholesterol  Exercise recommendations include exercising 3-5 times per week

## 2022-09-29 ENCOUNTER — OFFICE VISIT (OUTPATIENT)
Dept: ENDOCRINOLOGY | Facility: CLINIC | Age: 62
End: 2022-09-29
Payer: COMMERCIAL

## 2022-09-29 VITALS
WEIGHT: 159 LBS | BODY MASS INDEX: 28.17 KG/M2 | HEART RATE: 94 BPM | SYSTOLIC BLOOD PRESSURE: 130 MMHG | HEIGHT: 63 IN | DIASTOLIC BLOOD PRESSURE: 86 MMHG

## 2022-09-29 DIAGNOSIS — E78.5 HYPERLIPIDEMIA, UNSPECIFIED HYPERLIPIDEMIA TYPE: ICD-10-CM

## 2022-09-29 DIAGNOSIS — I10 HYPERTENSION, UNSPECIFIED TYPE: ICD-10-CM

## 2022-09-29 DIAGNOSIS — E11.9 TYPE 2 DIABETES MELLITUS WITHOUT COMPLICATION, WITHOUT LONG-TERM CURRENT USE OF INSULIN (HCC): Primary | ICD-10-CM

## 2022-09-29 PROCEDURE — 99214 OFFICE O/P EST MOD 30 MIN: CPT | Performed by: NURSE PRACTITIONER

## 2022-09-29 PROCEDURE — 3079F DIAST BP 80-89 MM HG: CPT | Performed by: NURSE PRACTITIONER

## 2022-09-29 PROCEDURE — 3075F SYST BP GE 130 - 139MM HG: CPT | Performed by: NURSE PRACTITIONER

## 2022-09-29 NOTE — PATIENT INSTRUCTIONS
1) Continue current medications for diabetes management  2) Discussed checking blood sugars at different times throughout the day, including am fasting  3) Is scheduling diabetic eye exam  4) Follow up in 3 months

## 2022-09-29 NOTE — PROGRESS NOTES
Established Patient Progress Note    CC: Diabetes Mellitus, Type 2    History of Present Illness:   Osiel Barth is a 57 y o  female with a history of type 2 diabetes without long term use of insulin for approximately 15 years  Denies complications of diabetes mellitus  Last hemoglobin A1c 9/21/2022 ws 7 1% Denies recent illness or hospitalizations  Denies recent severe hypoglycemic or severe hyperglycemic episodes  Denies any issues with her current regimen  Home glucose monitoring: are performed regularly in the morning, fasting  One hypoglycemic episode around dinnertime due to increased exercise without eating post breakfast     Home blood glucose readings:   Before breakfast:   Before lunch: does not check  Before dinner: does not check  Bedtime: does not check     Current regimen:   Metformin 1000 mg BID  Glimepiride 4 mg BID  Dapagliflozin 10 mg   Januvia 100 mg    Previously on Ozempic, had disorientation and L-sided headache    Last Eye Exam: 8/8/2021, will schedule   Last Foot Exam: 3/4/2022    Has hypertension: Taking lisinopril 20 mg daily  Has hyperlipidemia: Taking atorvastatin 40 mg daily , fish oil    Patient Active Problem List   Diagnosis    Asthma    [facility]    Family history of anesthesia complication    Gastroparesis    History of seizure    Hyperlipidemia    Hypertension    Lumbar stenosis    Pre-op testing    Cervical post-laminectomy syndrome    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[facility] )    Gastro-esophageal reflux disease with esophagitis    Paroxysmal supraventricular tachycardia ([facility] )      History reviewed  No pertinent past medical history  History reviewed  No pertinent surgical history  History reviewed  No pertinent family history    Social History     Tobacco Use    Smoking status: Former Smoker    Smokeless tobacco: Never Used    Tobacco comment:  years ago   Substance Use Topics    Alcohol use: Yes     Comment: holidays     Allergies   Allergen Reactions    Shellfish-Derivedproducts [Shellfish-Derived Products - Food Allergy] Anaphylaxis    Phenytoin Hives    Shellfish Allergy - Food Allergy Other (See Comments)         Current Outpatient Medications:     albuterol (PROVENTIL HFA,VENTOLIN HFA) 90 mcg/act inhaler, albuterol sulfate HFA 90 mcg/actuation aerosol inhaler, Disp: , Rfl:     atorvastatin (LIPITOR) 40 mg tablet, Take 40 mg by mouth daily  , Disp: , Rfl:     azelastine (OPTIVAR) 0 05 % ophthalmic solution, Administer 1 drop into the left eye 2 (two) times a day as needed (itching), Disp: 6 mL, Rfl: 0    Dapagliflozin Propanediol 10 MG TABS, Take 1 tablet (10 mg total) by mouth daily, Disp: 90 tablet, Rfl: 3    EPINEPHrine (EPIPEN) 0 3 mg/0 3 mL SOAJ, 1 Syringe, Disp: , Rfl:     gabapentin (NEURONTIN) 300 mg capsule, Take 300 mg by mouth 3 (three) times a day  , Disp: , Rfl:     glimepiride (AMARYL) 4 mg tablet, 1 tab bid, Disp: 180 tablet, Rfl: 3    glucose blood (OneTouch Ultra) test strip, Daily  , Disp: 100 each, Rfl: 3    ibuprofen (MOTRIN) 800 mg tablet, , Disp: , Rfl:     Januvia 100 MG tablet, 1 tab daily, Disp: 90 tablet, Rfl: 3    lisinopril (ZESTRIL) 20 mg tablet, Take 20 mg by mouth daily, Disp: , Rfl:     metFORMIN (GLUCOPHAGE) 1000 MG tablet, Take 1 tablet (1,000 mg total) by mouth 2 (two) times a day with meals, Disp: 180 tablet, Rfl: 3    Multiple Vitamin (MULTIVITAMIN ADULT PO), Take 1 tablet by mouth daily, Disp: , Rfl:     omeprazole (PriLOSEC) 40 MG capsule, , Disp: , Rfl:     Rhubarb (ESTROVEN MENOPAUSE RELIEF PO), Take 1 tablet by mouth daily, Disp: , Rfl:     tiZANidine (ZANAFLEX) 2 mg tablet, Take 2 mg by mouth daily, Disp: , Rfl:     TURMERIC PO, Take 500 mg by mouth daily, Disp: , Rfl:     Review of Systems   Constitutional: Negative for activity change, appetite change, fatigue and unexpected weight change  HENT: Negative for congestion  Eyes: Negative for visual disturbance     Respiratory: Negative for cough and shortness of breath  Cardiovascular: Negative for chest pain and palpitations  Gastrointestinal: Negative for abdominal distention, abdominal pain, constipation, diarrhea, nausea and vomiting  Endocrine: Negative for polydipsia, polyphagia and polyuria  Skin: Negative for color change, pallor and rash  Neurological: Negative for dizziness, light-headedness and headaches  Physical Exam:  Body mass index is 28 17 kg/m²  /86   Pulse 94   Ht 5' 3" (1 6 m)   Wt 72 1 kg (159 lb)   BMI 28 17 kg/m²    Wt Readings from Last 3 Encounters:   09/29/22 72 1 kg (159 lb)   09/27/22 72 2 kg (159 lb 3 2 oz)   06/24/22 72 4 kg (159 lb 9 6 oz)       Physical Exam  Vitals reviewed  Constitutional:       Appearance: Normal appearance  HENT:      Head: Normocephalic  Cardiovascular:      Rate and Rhythm: Normal rate and regular rhythm  Pulses: Normal pulses  Heart sounds: Normal heart sounds  Pulmonary:      Effort: Pulmonary effort is normal       Breath sounds: Normal breath sounds  Musculoskeletal:         General: Normal range of motion  Cervical back: Normal range of motion and neck supple  Skin:     General: Skin is warm and dry  Capillary Refill: Capillary refill takes less than 2 seconds  Neurological:      General: No focal deficit present  Mental Status: She is alert and oriented to person, place, and time     Psychiatric:         Mood and Affect: Mood normal          Behavior: Behavior normal          Labs:   Lab Results   Component Value Date    HGBA1C 7 1 (H) 09/21/2022     Lab Results   Component Value Date    CREATININE 1 12 (H) 09/21/2022    BUN 28 (H) 09/21/2022    K 5 1 09/21/2022     09/21/2022    CO2 18 (L) 09/21/2022     eGFR   Date Value Ref Range Status   09/21/2022 55 (L) >59 mL/min/1 73 Final     Lab Results   Component Value Date    HDL 39 (L) 09/21/2022    TRIG 282 (H) 09/21/2022     Lab Results   Component Value Date    ALT 24 09/21/2022    AST 24 09/21/2022     No results found for: [id number]  No results found for: FREET4, TSI    Impression & Plan:    Problem List Items Addressed This Visit        Endocrine    Diabetes mellitus ([facility] ) - Primary    Relevant Orders    HEMOGLOBIN A1C W/ EAG ESTIMATION Lab Collect    Basic metabolic panel Lab Collect       Cardiovascular and Mediastinum    Hypertension     Continues on lisinopril             Other    Hyperlipidemia     Continues on atorvastatin and fish oil               Orders Placed This Encounter   Procedures    HEMOGLOBIN A1C W/ EAG ESTIMATION Lab Collect     Standing Status:   Future     Standing Expiration Date:   9/29/2023    Basic metabolic panel Lab Collect     This is a patient instruction: Patient fasting for 8 hours or longer recommended  Standing Status:   Future     Standing Expiration Date:   9/29/2023         Discussed with the patient and all questioned fully answered  She will call me if any problems arise  Follow-up appointment in 3 months       Counseled patient on diagnostic results, prognosis, risk and benefit of treatment options, instruction for management, importance of treatment compliance, Risk  factor reduction and impressions    ERI Burdick

## 2022-10-08 ENCOUNTER — HOSPITAL ENCOUNTER (OUTPATIENT)
Dept: MAMMOGRAPHY | Facility: CLINIC | Age: 62
Discharge: HOME | End: 2022-10-08
Payer: COMMERCIAL

## 2022-10-08 VITALS — HEIGHT: 63 IN | WEIGHT: 157 LBS | BODY MASS INDEX: 27.82 KG/M2

## 2022-10-08 DIAGNOSIS — Z12.31 ENCOUNTER FOR SCREENING MAMMOGRAM FOR MALIGNANT NEOPLASM OF BREAST: ICD-10-CM

## 2022-10-08 PROCEDURE — 77067 SCR MAMMO BI INCL CAD: CPT

## 2022-10-08 PROCEDURE — 77063 BREAST TOMOSYNTHESIS BI: CPT

## 2022-10-13 ENCOUNTER — TELEPHONE (OUTPATIENT)
Dept: GYNECOLOGY | Facility: CLINIC | Age: 62
End: 2022-10-13

## 2022-10-13 NOTE — TELEPHONE ENCOUNTER
New patient called to cancel 10/26/22 appointment and to reschedule in 2023 with Dr Benton Graham  Did not answer return call  Left message that 10/26 appointment was cancelled per her request   Advised call back to reschedule

## 2022-10-20 ENCOUNTER — APPOINTMENT (OUTPATIENT)
Dept: RADIOLOGY | Facility: CLINIC | Age: 62
End: 2022-10-20
Payer: COMMERCIAL

## 2022-10-20 DIAGNOSIS — R06.00 DYSPNEA, UNSPECIFIED TYPE: ICD-10-CM

## 2022-10-20 DIAGNOSIS — R06.00 DYSPNEA, UNSPECIFIED TYPE: Primary | ICD-10-CM

## 2022-10-20 PROCEDURE — 71046 X-RAY EXAM CHEST 2 VIEWS: CPT

## 2022-11-09 ENCOUNTER — CONSULT (OUTPATIENT)
Dept: PULMONOLOGY | Facility: HOSPITAL | Age: 62
End: 2022-11-09

## 2022-11-09 VITALS
HEIGHT: 63 IN | DIASTOLIC BLOOD PRESSURE: 66 MMHG | WEIGHT: 158 LBS | SYSTOLIC BLOOD PRESSURE: 116 MMHG | TEMPERATURE: 98.8 F | BODY MASS INDEX: 28 KG/M2 | HEART RATE: 108 BPM | OXYGEN SATURATION: 98 %

## 2022-11-09 DIAGNOSIS — J45.20 MILD INTERMITTENT ASTHMA, UNSPECIFIED WHETHER COMPLICATED: Primary | ICD-10-CM

## 2022-11-09 DIAGNOSIS — R06.09 CHRONIC DYSPNEA: ICD-10-CM

## 2022-11-09 RX ORDER — BUDESONIDE AND FORMOTEROL FUMARATE DIHYDRATE 160; 4.5 UG/1; UG/1
2 AEROSOL RESPIRATORY (INHALATION) 2 TIMES DAILY
Qty: 10.2 G | Refills: 2 | Status: SHIPPED | OUTPATIENT
Start: 2022-11-09

## 2022-11-09 NOTE — PROGRESS NOTES
Pulmonary Initial Visit  Joni Ochoa 57 y o  female MRN: [id number]  @ Encounter: [id number]      Impressions/Recommendations:   Patient is a 38-year-old female with past medical history significant for seasonal allergies who presents to establish pulmonary care  The patient's primary concern is related to shortness of breath  The patient has undergone an extensive cardiac workup which has been so far negative  Interestingly, the patient notes palpitations which may be an SVT versus AFib versus PVC or possibly related to blood sugar as the patient noticed a mild association with this  If these persist, would recommend a Holter monitor per cardiology  For the patient's shortness of breath, it may be related to asthma, the patient does have an elevated eosinophil level and is not on a Laba/ics  She has had no significant usage of her beta agonist   Will give the patient a trial of Symbicort which she may use on an as-needed basis  Additionally will check [facility] allergy panel specific looking IgE  Will check PFTs  Palpitations  -  Concern for possible arrythmia; NSVT vs AFib vs PVC  -  If symptoms persist may consider holter monitor    Asthma  -  Trial symbicort  -  Check PFTs  -  Elevated eosinophil level  -  Check [facility]    Patient may follow up in 3-4 months or sooner as necessary  History of Present Illness   HPI:  Joni Ochoa is a 57 y o  female with pmhx sig for seasonal allergies who presents to establish pulmonary care  The patient first noted symptoms of shortness of breath about 2-3 months ago  With walking up a hill, she will notice palpitations  Her shortness of breath has been consistent  She reports her weight has been stable  She notes having seasonal asthma  She has a ventolin inhaler for more than 10 years  During her 25s, she had a regular inhaler  For the past several years, she has not used it  She denies wheezing or chest tightness        The patient smoked 0 5 ppd x 2 years, denies illicit drug use with only occasional etoh  She is a retired   She denies dust/gas/fumes  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  She denies family history of lung disease  She has an allergist for which she follows for her shellfish allergy  She denies snoring or excessive daytime sleepiness  Review of systems:  Review of systems was completed and was otherwise negative except as listed in HPI      Historical Information   Past Medical History:   Diagnosis Date   • Anemia     Due to heavy menstruation   • Asthma     Seasonal   • Diabetes mellitus ([facility] [address] )    • GERD (gastroesophageal reflux disease)     Could be due to gastroparesis   • Hypertension 2005   • Pneumonia     Walking pneumonia     Past Surgical History:   Procedure Laterality Date   • ADENOIDECTOMY  1965   • CHOLECYSTECTOMY     • [address]   , 2019 and     ACDF 2018, Laminectomy  and fusion    • TONSILLECTOMY  1965     Family History   Problem Relation Age of Onset   • Cancer Mother         Unknown   • Clotting disorder Mother         Factor XI deficiency   • Coronary artery disease Father          at 46 years   • Diabetes Father    • Hypertension Father    • Breast cancer Sister         52's   • Cancer Sister         Breast cancer twice   • COPD Sister    • No Known Problems Daughter    • No Known Problems Daughter    • No Known Problems Daughter    • No Known Problems Daughter    • No Known Problems Maternal Grandmother    • No Known Problems Maternal Grandfather    • No Known Problems Paternal Grandmother    • No Known Problems Paternal Grandfather    • No Known Problems Maternal Aunt    • Breast cancer Paternal Aunt         63's   • Cancer Paternal Aunt         Breast cancer   • Diabetes Paternal Aunt    • Hypertension Paternal Aunt    • Coronary artery disease Brother         Congestive Heart Failure   • Heart failure Brother    • Hypertension Brother    • Heart failure Paternal Uncle    • Hypertension Paternal Uncle    • Heart failure Paternal Uncle    • Hypertension Paternal Uncle        Social History     Socioeconomic History   • Marital status: /Civil Union     Spouse name: None   • Number of children: None   • Years of education: None   • Highest education level: None   Occupational History   • None   Tobacco Use   • Smoking status: Former Smoker     Packs/day: 0 50     Years: 2 00     Pack years: 1 00     Types: Cigarettes     Start date: 1983     Quit date: 1985     Years since quittin 8   • Smokeless tobacco: Never Used   • Tobacco comment:  years ago   Vaping Use   • Vaping Use: Never used   Substance and Sexual Activity   • Alcohol use: Yes     Comment: Occasional   • Drug use: Never   • Sexual activity: Not Currently     Partners: Male     Birth control/protection: Post-menopausal, Female Sterilization   Other Topics Concern   • None   Social History Narrative   • None     Social Determinants of Health     Financial Resource Strain: Not on file   Food Insecurity: Not on file   Transportation Needs: Not on file   Physical Activity: Not on file   Stress: Not on file   Social Connections: Not on file   Intimate Partner Violence: Not At Risk   • Fear of Current or Ex-Partner: No   • Emotionally Abused: No   • Physically Abused: No   • Sexually Abused: No   Housing Stability: Not on file       Meds/Allergies   No current facility-administered medications for this visit       (Not in a hospital admission)    Allergies   Allergen Reactions   • Shellfish-Derivedproducts [Shellfish-Derived Products - Food Allergy] Anaphylaxis   • Phenytoin Hives   • Shellfish Allergy - Food Allergy Other (See Comments)       Vitals: Blood pressure 116/66, pulse (!) 108, temperature 98 8 °F (37 1 °C), temperature source Tympanic, height 5' 3" (1 6 m), weight 71 7 kg (158 lb), SpO2 98 % , RA, Body mass index is 27 99 kg/m²  Physical Exam  General: Pleasant, Awake alert and oriented x 3, conversant without conversational dyspnea, NAD, normal affect  HEENT:  PERRL, Sclera noninjected, nonicteric OU, Nares patent, no nasal flaring, no nasal drainage, Mucous membranes, moist, no oral lesions, normal dentition  NECK: Trachea midline, no accessory muscle use, no stridor, no cervical or supraclavicular adenopathy, JVP not elevated  CARDIAC: Reg, single s1/S2, no m/r/g  PULM: CTA b/l  CHEST: No gross deformities, equal chest expansion on inspiration bilaterally  ABD: Normoactive bowel sounds, soft nontender, nondistended, no rebound, no rigidity, no guarding  EXT: No cyanosis, no clubbing, no edema, normal capillary refill  SKIN:  No rashes, no lesions  NEURO: no focal neurologic deficits, AAOx3, moving all extremities appropriately    Labs: I have personally reviewed pertinent lab results  Lab Results   Component Value Date    SODIUM 139 09/21/2022    K 5 1 09/21/2022     09/21/2022    CO2 18 (L) 09/21/2022    BUN 28 (H) 09/21/2022    CREATININE 1 12 (H) 09/21/2022    GLUC 110 (H) 09/21/2022     Lab Results   Component Value Date    WBC 6 9 09/21/2022    HGB 12 5 09/21/2022    HCT 38 8 09/21/2022    MCV 92 09/21/2022     09/21/2022     Imaging and other studies: I have personally reviewed pertinent reports  and I have personally reviewed pertinent films in PACS  CXR 10/20/2022:  No acute intrathoracic process    Pulmonary function testing:    No pulmonary function testing available for review  Echocardiogram:   No echocardiogram available for review    EKG, Pathology, and Other Studies: I have personally reviewed pertinent reports      8/10/2021  Care everywhere                                   Measurements   Intervals                              Axis             Rate:         80                       P:            45   CA:           167                      QRS:          3   QRSD:         80                       T:            11   QT:           340                                       QTc:          407                                                                  Interpretive Statements   Sinus rhythm   Low voltage, precordial leads     Referring:  Rudi Hamman, DO  [address]  [address]  [place],  [address]    [place]

## 2022-11-10 RX ORDER — CHLORAL HYDRATE 500 MG
CAPSULE ORAL
COMMUNITY
Start: 2022-08-01 | End: 2023-03-27 | Stop reason: ALTCHOICE

## 2022-11-10 RX ORDER — AMOXICILLIN 500 MG
CAPSULE ORAL
COMMUNITY
Start: 2022-08-01

## 2022-11-11 ENCOUNTER — APPOINTMENT (OUTPATIENT)
Dept: RADIOLOGY | Facility: CLINIC | Age: 62
End: 2022-11-11

## 2022-11-11 ENCOUNTER — OFFICE VISIT (OUTPATIENT)
Dept: FAMILY MEDICINE CLINIC | Facility: CLINIC | Age: 62
End: 2022-11-11

## 2022-11-11 VITALS
HEIGHT: 63 IN | TEMPERATURE: 98.7 F | SYSTOLIC BLOOD PRESSURE: 112 MMHG | HEART RATE: 115 BPM | OXYGEN SATURATION: 98 % | RESPIRATION RATE: 14 BRPM | BODY MASS INDEX: 27.07 KG/M2 | DIASTOLIC BLOOD PRESSURE: 86 MMHG | WEIGHT: 152.8 LBS

## 2022-11-11 DIAGNOSIS — S86.912A MUSCLE STRAIN OF LEFT LOWER LEG, INITIAL ENCOUNTER: ICD-10-CM

## 2022-11-11 DIAGNOSIS — M79.89 PAIN AND SWELLING OF LEFT LOWER LEG: ICD-10-CM

## 2022-11-11 DIAGNOSIS — S93.402A SPRAIN OF LEFT ANKLE, UNSPECIFIED LIGAMENT, INITIAL ENCOUNTER: Primary | ICD-10-CM

## 2022-11-11 DIAGNOSIS — S93.402A SPRAIN OF LEFT ANKLE, UNSPECIFIED LIGAMENT, INITIAL ENCOUNTER: ICD-10-CM

## 2022-11-11 DIAGNOSIS — M79.662 PAIN AND SWELLING OF LEFT LOWER LEG: ICD-10-CM

## 2022-11-11 PROBLEM — Z01.818 PRE-OP TESTING: Status: RESOLVED | Noted: 2021-12-20 | Resolved: 2022-11-11

## 2022-11-11 RX ORDER — NAPROXEN 500 MG/1
500 TABLET ORAL 2 TIMES DAILY WITH MEALS
Qty: 40 TABLET | Refills: 0 | Status: SHIPPED | OUTPATIENT
Start: 2022-11-11

## 2022-11-11 NOTE — PROGRESS NOTES
Assessment/Plan:  Problem List Items Addressed This Visit    None     Visit Diagnoses     Sprain of left ankle, unspecified ligament, initial encounter    -  Primary    Relevant Medications    Elastic Bandages & Supports (Aircast Sport Ankle Brace/Left) MISC    naproxen (Naprosyn) 500 mg tablet    Other Relevant Orders    XR ankle 3+ vw left (Completed)    XR tibia fibula 2 vw left (Completed)    XR foot 3+ vw left (Completed)    Muscle strain of left lower leg, initial encounter        Relevant Medications    Elastic Bandages & Supports (Aircast Sport Ankle Brace/Left) MISC    naproxen (Naprosyn) 500 mg tablet    Other Relevant Orders    XR ankle 3+ vw left (Completed)    XR tibia fibula 2 vw left (Completed)    XR foot 3+ vw left (Completed)    Pain and swelling of left lower leg        Relevant Medications    Elastic Bandages & Supports (Aircast Sport Ankle Brace/Left) MISC    naproxen (Naprosyn) 500 mg tablet    Other Relevant Orders    XR ankle 3+ vw left (Completed)    XR tibia fibula 2 vw left (Completed)    XR foot 3+ vw left (Completed)      I believe that the patient may actually have a sprain of her left ankle  We will treat her with the air cast and the naproxen as indicated  She can alternate between ice and heat to the area  She should elevate her leg at home  We will send her for the x-rays as a precaution and will follow up with the results  Some of the pain and numbness in her leg can related to her back  I advised her to give the injection more time and follow-up with pain management as scheduled  Return for Next scheduled follow up  I spent 20 minutes during the visit reviewing the history from the patient, performing the examination, discussing the findings with the patient, providing counseling and education, and making a plan  I spent 10 minutes ordering referrals and testing and documenting      Subjective:   Chief Complaint   Patient presents with   • Pain     L hip and L leg Patient ID: Ashley Noe is a 57 y o  female presents today for evaluation of left hip and left leg pain  Ashley Noe is a 57 y o  female who presents today for evaluation of worsening pain in her left ankle, left lower leg, and left foot for several weeks  She has been undergoing workup and treatment for degenerative disc disease of her lumbar spine in recently had an epidural injection earlier this week  Her MRI demonstrates some foraminal stenosis on the left side  She complains of persistent pain in her left lower leg  There has been a fusion in the lumbar spine  She is scheduled for physical therapy next week  There is pain in the left lower left and there is numbness in her left foot- she has had this for 2 months  There has been increasing swelling in her left ankle and into her foot  She is having hard time getting her shoe on  There is some trouble bearing weight  She saw Dr Tony Benedict 2 weeks ago and had an injection in the left hip also for bursitis and that has helped her hip pain  There is swelling in the ankle and foot at times  There is pain in the calf muscle and spasm  Seh is trying heat with some relief  There is pain in the back of   There is pain with weight bearing and difficulty walking  There is numbness in the foot and the ankle  There was no injury  She was on different medications including Tramadol and Gabapentin and there was no relief        The following portions of the patient's history were reviewed and updated as appropriate: allergies, current medications, past family history, past medical history, past social history, past surgical history and problem list   Patient Active Problem List   Diagnosis   • Asthma   • Diabetes mellitus ([facility] )   • Family history of anesthesia complication   • Gastroparesis   • History of seizure   • Hyperlipidemia   • Hypertension   • Lumbar stenosis   • Cervical post-laminectomy syndrome   • 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[facility] )   • Gastro-esophageal reflux disease with esophagitis   • Paroxysmal supraventricular tachycardia ([facility] [address] )     Past Medical History:   Diagnosis Date   • Anemia 2008    Due to heavy menstruation   • Asthma 2005    Seasonal   • Diabetes mellitus ([facility] [address] )    • GERD (gastroesophageal reflux disease)     Could be due to gastroparesis   • Hypertension 2005   • Pneumonia 2018    Walking pneumonia     Past Surgical History:   Procedure Laterality Date   • ADENOIDECTOMY  1965   • CHOLECYSTECTOMY  2006   • [address]  2018, 2019 and     ACDF 2018, Laminectomy  and fusion    • TONSILLECTOMY  1965     Allergies   Allergen Reactions   • Shellfish-Derivedproducts [Shellfish-Derived Products - Food Allergy] Anaphylaxis   • Phenytoin Hives   • Shellfish Allergy - Food Allergy Other (See Comments)     Family History   Problem Relation Age of Onset   • Cancer Mother         Unknown   • Clotting disorder Mother         Factor XI deficiency   • Coronary artery disease Father          at 46 years   • Diabetes Father    • Hypertension Father    • Breast cancer Sister         52's   • Cancer Sister         Breast cancer twice   • COPD Sister    • No Known Problems Daughter    • No Known Problems Daughter    • No Known Problems Daughter    • No Known Problems Daughter    • No Known Problems Maternal Grandmother    • No Known Problems Maternal Grandfather    • No Known Problems Paternal Grandmother    • No Known Problems Paternal Grandfather    • No Known Problems Maternal Aunt    • Breast cancer Paternal Aunt         63's   • Cancer Paternal Aunt         Breast cancer   • Diabetes Paternal Aunt    • Hypertension Paternal Aunt    • Coronary artery disease Brother         Congestive Heart Failure   • Heart failure Brother    • Hypertension Brother    • Heart failure Paternal Uncle    • Hypertension Paternal Uncle    • Heart failure Paternal Uncle    • Hypertension Paternal Uncle      Social History     Socioeconomic History   • Marital status: /Civil Union     Spouse name: Not on file   • Number of children: Not on file   • Years of education: Not on file   • Highest education level: Not on file   Occupational History   • Not on file   Tobacco Use   • Smoking status: Former Smoker     Packs/day: 0 50     Years: 2 00     Pack years: 1 00     Types: Cigarettes     Start date: 1983     Quit date: 1985     Years since quittin 8   • Smokeless tobacco: Never Used   • Tobacco comment:  years ago   Vaping Use   • Vaping Use: Never used   Substance and Sexual Activity   • Alcohol use: Yes     Comment: Occasional   • Drug use: Never   • Sexual activity: Not Currently     Partners: Male     Birth control/protection: Post-menopausal, Female Sterilization   Other Topics Concern   • Not on file   Social History Narrative   • Not on file     Social Determinants of Health     Financial Resource Strain: Not on file   Food Insecurity: Not on file   Transportation Needs: Not on file   Physical Activity: Not on file   Stress: Not on file   Social Connections: Not on file   Intimate Partner Violence: Not At Risk   • Fear of Current or Ex-Partner: No   • Emotionally Abused: No   • Physically Abused: No   • Sexually Abused: No   Housing Stability: Not on file     Current Outpatient Medications on File Prior to Visit   Medication Sig Dispense Refill   • albuterol (PROVENTIL HFA,VENTOLIN HFA) 90 mcg/act inhaler albuterol sulfate HFA 90 mcg/actuation aerosol inhaler     • atorvastatin (LIPITOR) 40 mg tablet Take 40 mg by mouth daily       • budesonide-formoterol (Symbicort) 160-4 5 mcg/act inhaler Inhale 2 puffs 2 (two) times a day Rinse mouth after use  10 2 g 2   • Dapagliflozin Propanediol 10 MG TABS Take 1 tablet (10 mg total) by mouth daily 90 tablet 3   • EPINEPHrine (EPIPEN) 0 3 mg/0 3 mL SOAJ 1 Syringe     • glimepiride (AMARYL) 4 mg tablet 1 tab bid 180 tablet 3   • glucose blood (OneTouch Ultra) test strip Daily   100 each 3 • Januvia 100 MG tablet 1 tab daily 90 tablet 3   • lisinopril (ZESTRIL) 20 mg tablet Take 20 mg by mouth daily     • metFORMIN (GLUCOPHAGE) 1000 MG tablet Take 1 tablet (1,000 mg total) by mouth 2 (two) times a day with meals 180 tablet 3   • Multiple Vitamin (MULTIVITAMIN ADULT PO) Take 1 tablet by mouth daily     • Omega-3 Fatty Acids (fish oil) 1,000 mg      • Omega-3 Fatty Acids (Fish Oil) 1200 MG CAPS      • omeprazole (PriLOSEC) 40 MG capsule      • Rhubarb (ESTROVEN MENOPAUSE RELIEF PO) Take 1 tablet by mouth daily     • TURMERIC PO Take 500 mg by mouth daily       No current facility-administered medications on file prior to visit  Review of Systems   Constitutional: Negative  HENT: Negative  Eyes: Negative  Respiratory: Negative  Cardiovascular: Negative  Gastrointestinal: Negative  Endocrine: Negative  Genitourinary: Negative  Musculoskeletal: Positive for arthralgias, joint swelling and myalgias  Skin: Negative  Allergic/Immunologic: Negative  Neurological: Negative  Hematological: Negative  Psychiatric/Behavioral: Negative  Objective:  Vitals:    11/11/22 1529   BP: 112/86   BP Location: Left arm   Patient Position: Sitting   Cuff Size: Standard   Pulse: (!) 115   Resp: 14   Temp: 98 7 °F (37 1 °C)   SpO2: 98%   Weight: 69 3 kg (152 lb 12 8 oz)   Height: 5' 3" (1 6 m)     Body mass index is 27 07 kg/m²  Physical Exam  Constitutional:       Appearance: She is well-developed  HENT:      Head: Normocephalic and atraumatic  Mouth/Throat:      Pharynx: No oropharyngeal exudate  Eyes:      Conjunctiva/sclera: Conjunctivae normal       Pupils: Pupils are equal, round, and reactive to light  Neck:      Thyroid: No thyromegaly  Vascular: No JVD  Trachea: No tracheal deviation  Cardiovascular:      Rate and Rhythm: Normal rate and regular rhythm  Heart sounds: Normal heart sounds  No murmur heard  No friction rub  No gallop  Pulmonary:      Effort: Pulmonary effort is normal  No respiratory distress  Breath sounds: Normal breath sounds  No stridor  No wheezing or rales  Chest:      Chest wall: No tenderness  Abdominal:      General: Bowel sounds are normal  There is no distension  Palpations: Abdomen is soft  There is no mass  Tenderness: There is no abdominal tenderness  There is no guarding or rebound  Musculoskeletal:         General: Swelling and tenderness present  No deformity  Cervical back: Normal range of motion  Left lower leg: Tenderness present  No swelling  No edema  Right ankle: Normal       Left ankle: Swelling present  No ecchymosis or lacerations  Tenderness present over the lateral malleolus  Decreased range of motion  Left Achilles Tendon: Normal    Lymphadenopathy:      Cervical: No cervical adenopathy  Skin:     General: Skin is warm and dry  Neurological:      Mental Status: She is alert and oriented to person, place, and time  Cranial Nerves: No cranial nerve deficit  Motor: No abnormal muscle tone  Coordination: Coordination normal       Deep Tendon Reflexes: Reflexes are normal and symmetric   Reflexes normal

## 2022-11-14 ENCOUNTER — TELEPHONE (OUTPATIENT)
Dept: FAMILY MEDICINE CLINIC | Facility: CLINIC | Age: 62
End: 2022-11-14

## 2022-11-14 NOTE — TELEPHONE ENCOUNTER
----- Message from Mode Ellis DO sent at 11/14/2022 12:50 AM EST -----  Regarding: x-rays  Please let the patient know that her x-rays were unremarkable  There is no evidence of fracture any bony abnormalities  She should follow the instructions given the office and report back if there is no improvement in a week

## 2022-12-05 ENCOUNTER — RA CDI HCC (OUTPATIENT)
Dept: OTHER | Facility: HOSPITAL | Age: 62
End: 2022-12-05

## 2022-12-05 NOTE — PROGRESS NOTES
[facility]  coding opportunities       Chart reviewed, no opportunity found: CHART REVIEWED, NO OPPORTUNITY FOUND        Patients Insurance        Commercial Insurance: [employer]

## 2022-12-06 ENCOUNTER — OFFICE VISIT (OUTPATIENT)
Dept: FAMILY MEDICINE CLINIC | Facility: CLINIC | Age: 62
End: 2022-12-06

## 2022-12-06 VITALS
SYSTOLIC BLOOD PRESSURE: 138 MMHG | HEIGHT: 63 IN | OXYGEN SATURATION: 96 % | DIASTOLIC BLOOD PRESSURE: 82 MMHG | TEMPERATURE: 97.6 F | WEIGHT: 150.2 LBS | HEART RATE: 106 BPM | BODY MASS INDEX: 26.61 KG/M2 | RESPIRATION RATE: 18 BRPM

## 2022-12-06 DIAGNOSIS — M79.89 LEG SWELLING: Primary | ICD-10-CM

## 2022-12-06 NOTE — PROGRESS NOTES
Assessment/Plan:     Diagnoses and all orders for this visit:    Leg swelling  -     CBC; Future  -     Comprehensive metabolic panel; Future  -     Sedimentation rate, automated; Future  -     Lyme Total Antibody Profile with reflex to WB; Future  -     Uric acid; Future  -     VAS lower limb venous duplex study, unilateral/limited; Future      Etiology unclear  X-rays do not show anything significant  Will check for gout as well as any sort of blood clot since her pain radiates into her calf  We will follow-up after work-up      Subjective:     Chief Complaint   Patient presents with   • Left leg pain     Patient was recently seen in the office by Dr Jennifer Sheth for pain and swelling of the left lower leg  Her xrays were normal, other than a heel spur  Patient reports that her left leg, ankle, and top of the foot are still having pain and swelling at the end of the day  The Naproxen prescribed at her last visit helped, but she's out of it  Patient ID: Kriss Bonilla is a 57 y o  female  Patient presents today for left ankle pain  She had it x-rayed a few weeks ago  It has not improved much  She is using a brace for  But x-rays were normal      The following portions of the patient's history were reviewed and updated as appropriate: allergies, current medications, past family history, past medical history, past social history, past surgical history and problem list     Review of Systems   Constitutional: Negative  HENT: Negative  Eyes: Negative  Respiratory: Negative  Cardiovascular: Negative  Gastrointestinal: Negative  Endocrine: Negative  Genitourinary: Negative  Musculoskeletal: Positive for arthralgias  Skin: Negative  Allergic/Immunologic: Negative  Neurological: Negative  Hematological: Negative  Psychiatric/Behavioral: Negative  All other systems reviewed and are negative          Objective:    Vitals:    12/06/22 1533   BP: 138/82   BP Location: Left arm Patient Position: Sitting   Cuff Size: Standard   Pulse: (!) 106   Resp: 18   Temp: 97 6 °F (36 4 °C)   TempSrc: Tympanic   SpO2: 96%   Weight: 68 1 kg (150 lb 3 2 oz)   Height: 5' 3" (1 6 m)          Physical Exam  Vitals and nursing note reviewed  Constitutional:       Appearance: She is well-developed and well-nourished  HENT:      Head: Normocephalic and atraumatic  Right Ear: External ear normal       Left Ear: External ear normal       Mouth/Throat:      Mouth: Oropharynx is clear and moist    Eyes:      Extraocular Movements: EOM normal       Conjunctiva/sclera: Conjunctivae normal       Pupils: Pupils are equal, round, and reactive to light  Cardiovascular:      Rate and Rhythm: Normal rate and regular rhythm  Heart sounds: Normal heart sounds  Pulmonary:      Effort: Pulmonary effort is normal       Breath sounds: Normal breath sounds  Abdominal:      General: Bowel sounds are normal       Palpations: Abdomen is soft  Musculoskeletal:         General: Normal range of motion  Cervical back: Normal range of motion  Skin:     General: Skin is warm and dry  Neurological:      Mental Status: She is alert and oriented to person, place, and time  Deep Tendon Reflexes: Reflexes are normal and symmetric  Psychiatric:         Mood and Affect: Mood and affect normal          Behavior: Behavior normal          Thought Content:  Thought content normal          Judgment: Judgment normal

## 2022-12-07 ENCOUNTER — APPOINTMENT (OUTPATIENT)
Dept: LAB | Facility: CLINIC | Age: 62
End: 2022-12-07

## 2022-12-07 DIAGNOSIS — J45.20 MILD INTERMITTENT ASTHMA, UNSPECIFIED WHETHER COMPLICATED: ICD-10-CM

## 2022-12-07 DIAGNOSIS — M79.89 LEG SWELLING: ICD-10-CM

## 2022-12-07 LAB
ALBUMIN SERPL BCP-MCNC: 4.3 G/DL (ref 3.5–5)
ALP SERPL-CCNC: 95 U/L (ref 46–116)
ALT SERPL W P-5'-P-CCNC: 39 U/L (ref 12–78)
ANION GAP SERPL CALCULATED.3IONS-SCNC: 6 MMOL/L (ref 4–13)
AST SERPL W P-5'-P-CCNC: 22 U/L (ref 5–45)
BILIRUB SERPL-MCNC: 0.31 MG/DL (ref 0.2–1)
BUN SERPL-MCNC: 29 MG/DL (ref 5–25)
CALCIUM SERPL-MCNC: 9.9 MG/DL (ref 8.3–10.1)
CHLORIDE SERPL-SCNC: 109 MMOL/L (ref 96–108)
CO2 SERPL-SCNC: 24 MMOL/L (ref 21–32)
CREAT SERPL-MCNC: 0.83 MG/DL (ref 0.6–1.3)
ERYTHROCYTE [DISTWIDTH] IN BLOOD BY AUTOMATED COUNT: 13.2 % (ref 11.6–15.1)
ERYTHROCYTE [SEDIMENTATION RATE] IN BLOOD: 12 MM/HOUR (ref 0–29)
GFR SERPL CREATININE-BSD FRML MDRD: 74 ML/MIN/1.73SQ M
GLUCOSE SERPL-MCNC: 106 MG/DL (ref 65–140)
HCT VFR BLD AUTO: 39 % (ref 34.8–46.1)
HGB BLD-MCNC: 12.5 G/DL (ref 11.5–15.4)
MCH RBC QN AUTO: 30.3 PG (ref 26.8–34.3)
MCHC RBC AUTO-ENTMCNC: 32.1 G/DL (ref 31.4–37.4)
MCV RBC AUTO: 95 FL (ref 82–98)
PLATELET # BLD AUTO: 261 THOUSANDS/UL (ref 149–390)
PMV BLD AUTO: 10.7 FL (ref 8.9–12.7)
POTASSIUM SERPL-SCNC: 4.5 MMOL/L (ref 3.5–5.3)
PROT SERPL-MCNC: 7.2 G/DL (ref 6.4–8.4)
RBC # BLD AUTO: 4.12 MILLION/UL (ref 3.81–5.12)
SODIUM SERPL-SCNC: 139 MMOL/L (ref 135–147)
URATE SERPL-MCNC: 4.4 MG/DL (ref 2–7.5)
WBC # BLD AUTO: 8.36 THOUSAND/UL (ref 4.31–10.16)

## 2022-12-08 LAB
A ALTERNATA IGE QN: <0.1 KUA/I
A FUMIGATUS IGE QN: <0.1 KUA/I
B BURGDOR IGG+IGM SER-ACNC: <0.2 AI
BERMUDA GRASS IGE QN: 0.21 KUA/I
BOXELDER IGE QN: 0.2 KUA/I
C HERBARUM IGE QN: <0.1 KUA/I
CAT DANDER IGE QN: <0.1 KUA/I
CMN PIGWEED IGE QN: 0.15 KUA/I
COMMON RAGWEED IGE QN: 0.23 KUA/I
COTTONWOOD IGE QN: 0.22 KUA/I
D FARINAE IGE QN: <0.1 KUA/I
D PTERONYSS IGE QN: <0.1 KUA/I
DOG DANDER IGE QN: <0.1 KUA/I
LONDON PLANE IGE QN: 0.23 KUA/I
MOUSE URINE PROT IGE QN: <0.1 KUA/I
MT JUNIPER IGE QN: 0.19 KUA/I
MUGWORT IGE QN: 0.13 KUA/I
P NOTATUM IGE QN: <0.1 KUA/I
ROACH IGE QN: 0.11 KUA/I
SHEEP SORREL IGE QN: 0.13 KUA/I
SILVER BIRCH IGE QN: 0.12 KUA/I
TIMOTHY IGE QN: 0.21 KUA/I
TOTAL IGE SMQN RAST: 85.5 KU/L (ref 0–113)
WALNUT IGE QN: 0.21 KUA/I
WHITE ASH IGE QN: 0.22 KUA/I
WHITE ELM IGE QN: 0.22 KUA/I
WHITE MULBERRY IGE QN: 0.14 KUA/I
WHITE OAK IGE QN: 0.22 KUA/I

## 2022-12-12 ENCOUNTER — HOSPITAL ENCOUNTER (OUTPATIENT)
Dept: PULMONOLOGY | Facility: HOSPITAL | Age: 62
Discharge: HOME | End: 2022-12-12

## 2022-12-12 DIAGNOSIS — R06.09 CHRONIC DYSPNEA: ICD-10-CM

## 2022-12-12 RX ORDER — ALBUTEROL SULFATE 2.5 MG/3ML
2.5 SOLUTION RESPIRATORY (INHALATION) ONCE
Status: COMPLETED | OUTPATIENT
Start: 2022-12-12 | End: 2022-12-12

## 2022-12-12 RX ADMIN — ALBUTEROL SULFATE 2.5 MG: 2.5 SOLUTION RESPIRATORY (INHALATION) at 15:23

## 2022-12-13 ENCOUNTER — HOSPITAL ENCOUNTER (OUTPATIENT)
Dept: NON INVASIVE DIAGNOSTICS | Facility: HOSPITAL | Age: 62
Discharge: HOME | End: 2022-12-13

## 2022-12-13 DIAGNOSIS — M79.89 LEG SWELLING: ICD-10-CM

## 2022-12-29 ENCOUNTER — LAB (OUTPATIENT)
Dept: LAB | Facility: CLINIC | Age: 62
End: 2022-12-29

## 2022-12-29 DIAGNOSIS — E11.9 TYPE 2 DIABETES MELLITUS WITHOUT COMPLICATION, WITHOUT LONG-TERM CURRENT USE OF INSULIN (HCC): ICD-10-CM

## 2022-12-29 LAB
ANION GAP SERPL CALCULATED.3IONS-SCNC: 8 MMOL/L (ref 4–13)
BUN SERPL-MCNC: 17 MG/DL (ref 5–25)
CALCIUM SERPL-MCNC: 9.3 MG/DL (ref 8.3–10.1)
CHLORIDE SERPL-SCNC: 111 MMOL/L (ref 96–108)
CO2 SERPL-SCNC: 25 MMOL/L (ref 21–32)
CREAT SERPL-MCNC: 0.82 MG/DL (ref 0.6–1.3)
EST. AVERAGE GLUCOSE BLD GHB EST-MCNC: 137 MG/DL
GFR SERPL CREATININE-BSD FRML MDRD: 75 ML/MIN/1.73SQ M
GLUCOSE P FAST SERPL-MCNC: 130 MG/DL (ref 65–99)
HBA1C MFR BLD: 6.4 %
POTASSIUM SERPL-SCNC: 4.6 MMOL/L (ref 3.5–5.3)
SODIUM SERPL-SCNC: 144 MMOL/L (ref 135–147)

## 2023-01-05 ENCOUNTER — OFFICE VISIT (OUTPATIENT)
Dept: ENDOCRINOLOGY | Facility: CLINIC | Age: 63
End: 2023-01-05

## 2023-01-05 VITALS
SYSTOLIC BLOOD PRESSURE: 110 MMHG | WEIGHT: 151.2 LBS | HEART RATE: 100 BPM | HEIGHT: 63 IN | BODY MASS INDEX: 26.79 KG/M2 | DIASTOLIC BLOOD PRESSURE: 82 MMHG

## 2023-01-05 DIAGNOSIS — E11.9 TYPE 2 DIABETES MELLITUS WITHOUT COMPLICATION, WITHOUT LONG-TERM CURRENT USE OF INSULIN (HCC): Primary | ICD-10-CM

## 2023-01-05 DIAGNOSIS — I10 HYPERTENSION, UNSPECIFIED TYPE: ICD-10-CM

## 2023-01-05 DIAGNOSIS — E78.5 HYPERLIPIDEMIA, UNSPECIFIED HYPERLIPIDEMIA TYPE: ICD-10-CM

## 2023-01-05 NOTE — PROGRESS NOTES
1/5/2023    Assessment/Plan      Diagnoses and all orders for this visit:    Type 2 diabetes mellitus without complication, without long-term current use of insulin (HCC)  -     Hemoglobin A1C; Future  -     Comprehensive metabolic panel; Future  -     Lipid Panel with Direct LDL reflex; Future  -     CBC and differential; Future  -     Microalbumin / creatinine urine ratio; Future  -     TSH, 3rd generation; Future  -     Hemoglobin A1C  -     Comprehensive metabolic panel  -     Lipid Panel with Direct LDL reflex  -     CBC and differential  -     Microalbumin / creatinine urine ratio  -     TSH, 3rd generation    Hypertension, unspecified type  -     Hemoglobin A1C; Future  -     Comprehensive metabolic panel; Future  -     Lipid Panel with Direct LDL reflex; Future  -     CBC and differential; Future  -     Microalbumin / creatinine urine ratio; Future  -     TSH, 3rd generation; Future  -     Hemoglobin A1C  -     Comprehensive metabolic panel  -     Lipid Panel with Direct LDL reflex  -     CBC and differential  -     Microalbumin / creatinine urine ratio  -     TSH, 3rd generation    Hyperlipidemia, unspecified hyperlipidemia type  -     Hemoglobin A1C; Future  -     Comprehensive metabolic panel; Future  -     Lipid Panel with Direct LDL reflex; Future  -     CBC and differential; Future  -     Microalbumin / creatinine urine ratio; Future  -     TSH, 3rd generation; Future  -     Hemoglobin A1C  -     Comprehensive metabolic panel  -     Lipid Panel with Direct LDL reflex  -     CBC and differential  -     Microalbumin / creatinine urine ratio  -     TSH, 3rd generation        Assessment/Plan:  #1 type 2 diabetes: Well-controlled on current regimen  Discussed with weight loss as well as increase exercise she may develop hypoglycemia specially on glimepiride    Asked her to keep me apprised of her blood sugars and any development of hypoglycemia symptoms and hypoglycemia and we reviewed treatment of hypoglycemia  Follow-up in 4 months with labs just prior  2   Hypertension: Continue lisinopril  3   Hyperlipidemia: Continue statin  CC: Diabetes follow-up    History of Present Illness     HPI: Danial Marc is a 57y o  year old female with type 2 diabetes for about 15 years  She is on oral agents at home and takes metformin 1000 mg twice daily, Januvia 100 mg daily, glimepiride 4 mg twice daily, Farxiga 10 mg daily  She denies any polyuria, polydipsia, nocturia and blurry vision  She denies neuropathy, nephropathy, retinopathy and heart attack but does admit to history of TIA in the past   In the past he did not tolerate Ozempic  Hypoglycemic episodes: No      The patient's last eye exam was in fall 2022  The patient's last foot exam was in March 2022 with her PCP  Blood Sugar/Glucometer/Pump/CGM review: Blood sugars checked daily are frequently ago with one value of less than 70  She has a history of hyperlipidemia and is treated with atorvastatin 40 mg daily      She is a history of hypertension treated with lisinopril 20 mg daily  Review of Systems   Constitutional: Negative for fatigue  HENT: Negative for trouble swallowing and voice change  Eyes: Negative for visual disturbance  Respiratory: Negative for shortness of breath  Cardiovascular: Negative for palpitations and leg swelling  Gastrointestinal: Negative for abdominal pain, nausea and vomiting  Endocrine: Negative for polydipsia and polyuria  Musculoskeletal: Negative for arthralgias and myalgias  Skin: Negative for rash  Neurological: Negative for dizziness, tremors and weakness  Hematological: Negative for adenopathy  Psychiatric/Behavioral: Negative for agitation and confusion         Historical Information   Past Medical History:   Diagnosis Date   • Anemia 2008    Due to heavy menstruation   • Asthma 2005    Seasonal   • Diabetes mellitus ([facility] ) 2006   • GERD (gastroesophageal reflux disease) 2008 Could be due to gastroparesis   • Hypertension 2005   • Pneumonia 2018    Walking pneumonia     Past Surgical History:   Procedure Laterality Date   • ADENOIDECTOMY  1965   • CHOLECYSTECTOMY  2006   • [address]   2018, 2019 and     ACDF 2018, Laminectomy 2019 and fusion    • TONSILLECTOMY  1965     Social History   Social History     Substance and Sexual Activity   Alcohol Use Yes    Comment: Occasional     Social History     Substance and Sexual Activity   Drug Use Never     Social History     Tobacco Use   Smoking Status Former   • Packs/day: 0 50   • Years: 2 00   • Pack years: 1 00   • Types: Cigarettes   • Start date: 1983   • Quit date: 1985   • Years since quittin 0   Smokeless Tobacco Never   Tobacco Comments     years ago     Family History:   Family History   Problem Relation Age of Onset   • Cancer Mother         Unknown   • Clotting disorder Mother         Factor XI deficiency   • Coronary artery disease Father          at 46 years   • Diabetes Father    • Hypertension Father    • Breast cancer Sister         52's   • Cancer Sister         Breast cancer twice   • COPD Sister    • No Known Problems Daughter    • No Known Problems Daughter    • No Known Problems Daughter    • No Known Problems Daughter    • No Known Problems Maternal Grandmother    • No Known Problems Maternal Grandfather    • No Known Problems Paternal Grandmother    • No Known Problems Paternal Grandfather    • No Known Problems Maternal Aunt    • Breast cancer Paternal Aunt         63's   • Cancer Paternal Aunt         Breast cancer   • Diabetes Paternal Aunt    • Hypertension Paternal Aunt    • Coronary artery disease Brother         Congestive Heart Failure   • Heart failure Brother    • Hypertension Brother    • Heart failure Paternal Uncle    • Hypertension Paternal Uncle    • Heart failure Paternal Uncle    • Hypertension Paternal Uncle        Meds/Allergies   Current Outpatient Medications Medication Sig Dispense Refill   • atorvastatin (LIPITOR) 40 mg tablet Take 40 mg by mouth daily       • budesonide-formoterol (Symbicort) 160-4 5 mcg/act inhaler Inhale 2 puffs 2 (two) times a day Rinse mouth after use  10 2 g 2   • Dapagliflozin Propanediol 10 MG TABS Take 1 tablet (10 mg total) by mouth daily 90 tablet 3   • Elastic Bandages & Supports (Aircast Sport Ankle Brace/Left) MISC Use daily 1 each 0   • EPINEPHrine (EPIPEN) 0 3 mg/0 3 mL SOAJ 1 Syringe     • glimepiride (AMARYL) 4 mg tablet 1 tab bid 180 tablet 3   • glucose blood (OneTouch Ultra) test strip Daily  100 each 3   • Januvia 100 MG tablet 1 tab daily 90 tablet 3   • lisinopril (ZESTRIL) 20 mg tablet Take 20 mg by mouth daily     • metFORMIN (GLUCOPHAGE) 1000 MG tablet Take 1 tablet (1,000 mg total) by mouth 2 (two) times a day with meals 180 tablet 3   • Multiple Vitamin (MULTIVITAMIN ADULT PO) Take 1 tablet by mouth daily     • Omega-3 Fatty Acids (fish oil) 1,000 mg      • omeprazole (PriLOSEC) 40 MG capsule      • Rhubarb (ESTROVEN MENOPAUSE RELIEF PO) Take 1 tablet by mouth daily     • TURMERIC PO Take 500 mg by mouth daily     • albuterol (PROVENTIL HFA,VENTOLIN HFA) 90 mcg/act inhaler albuterol sulfate HFA 90 mcg/actuation aerosol inhaler (Patient not taking: Reported on 1/5/2023)     • naproxen (Naprosyn) 500 mg tablet Take 1 tablet (500 mg total) by mouth 2 (two) times a day with meals (Patient not taking: Reported on 12/6/2022) 40 tablet 0   • Omega-3 Fatty Acids (Fish Oil) 1200 MG CAPS  (Patient not taking: Reported on 1/5/2023)       No current facility-administered medications for this visit  Allergies   Allergen Reactions   • Shellfish-Derivedproducts [Shellfish-Derived Products - Food Allergy] Anaphylaxis   • Phenytoin Hives   • Shellfish Allergy - Food Allergy Other (See Comments)       Objective   Vitals: Blood pressure 110/82, pulse 100, height 5' 3" (1 6 m), weight 68 6 kg (151 lb 3 2 oz)    Invasive Devices None                 Physical Exam  Vitals reviewed  Constitutional:       General: She is not in acute distress  Appearance: She is well-developed  She is not diaphoretic  HENT:      Head: Normocephalic and atraumatic  Eyes:      Conjunctiva/sclera: Conjunctivae normal       Pupils: Pupils are equal, round, and reactive to light  Neck:      Thyroid: No thyromegaly  Cardiovascular:      Rate and Rhythm: Normal rate and regular rhythm  Pulmonary:      Effort: Pulmonary effort is normal  No respiratory distress  Breath sounds: Normal breath sounds  Abdominal:      General: Bowel sounds are normal       Palpations: Abdomen is soft  Musculoskeletal:         General: Normal range of motion  Cervical back: Normal range of motion and neck supple  Skin:     General: Skin is warm and dry  Findings: No rash  Neurological:      Mental Status: She is alert and oriented to person, place, and time  Motor: No abnormal muscle tone  Psychiatric:         Behavior: Behavior normal          The history was obtained from the review of the chart and from the patient      Lab Results:    Most recent Alc is  Lab Results   Component Value Date    HGBA1C 6 4 (H) 12/29/2022           No components found for: HA1C  No components found for: GLU    Lab Results   Component Value Date    CREATININE 0 82 12/29/2022    CREATININE 0 83 12/07/2022    CREATININE 1 12 (H) 09/21/2022    BUN 17 12/29/2022    K 4 6 12/29/2022     (H) 12/29/2022    CO2 25 12/29/2022     eGFR   Date Value Ref Range Status   12/29/2022 75 ml/min/1 73sq m Final     No components found for: [facility]    Lab Results   Component Value Date    HDL 39 (L) 09/21/2022    TRIG 282 (H) 09/21/2022       Lab Results   Component Value Date    ALT 39 12/07/2022    AST 22 12/07/2022    ALKPHOS 95 12/07/2022       Lab Results   Component Value Date    TSH 2 260 09/21/2022             Future Appointments   Date Time Provider Tatianna Kline 3/27/2023  2:00 PM DO MARLENI Glez [facility]   11/6/2023 10:00 AM DO UYEN Lora [facility]       Portions of the record may have been created with voice recognition software  Occasional wrong word or "sound a like" substitutions may have occurred due to the inherent limitations of voice recognition software  Read the chart carefully and recognize, using context, where substitutions have occurred

## 2023-03-13 DIAGNOSIS — E11.42 TYPE 2 DIABETES MELLITUS WITH DIABETIC POLYNEUROPATHY, WITHOUT LONG-TERM CURRENT USE OF INSULIN (HCC): ICD-10-CM

## 2023-03-13 NOTE — TELEPHONE ENCOUNTER
Pt called requesting a med refill on farxiga 10mg daily  Pt wants a 90 day supply to be sent to [facility] pharmacy in [place] because she has a coupon   Pasha Calvillo

## 2023-03-27 ENCOUNTER — OFFICE VISIT (OUTPATIENT)
Dept: FAMILY MEDICINE CLINIC | Facility: CLINIC | Age: 63
End: 2023-03-27

## 2023-03-27 VITALS
OXYGEN SATURATION: 98 % | TEMPERATURE: 97.8 F | SYSTOLIC BLOOD PRESSURE: 136 MMHG | HEIGHT: 63 IN | WEIGHT: 148.2 LBS | DIASTOLIC BLOOD PRESSURE: 82 MMHG | RESPIRATION RATE: 18 BRPM | HEART RATE: 102 BPM | BODY MASS INDEX: 26.26 KG/M2

## 2023-03-27 DIAGNOSIS — I47.1 PAROXYSMAL SUPRAVENTRICULAR TACHYCARDIA (HCC): ICD-10-CM

## 2023-03-27 DIAGNOSIS — E11.9 TYPE 2 DIABETES MELLITUS WITHOUT COMPLICATION, WITHOUT LONG-TERM CURRENT USE OF INSULIN (HCC): ICD-10-CM

## 2023-03-27 DIAGNOSIS — D68.1 CONGENITAL FACTOR XI DEFICIENCY (HCC): ICD-10-CM

## 2023-03-27 DIAGNOSIS — Z00.00 ENCOUNTER FOR WELL ADULT EXAM WITHOUT ABNORMAL FINDINGS: Primary | ICD-10-CM

## 2023-03-27 PROBLEM — M25.572 PAIN IN LEFT ANKLE AND JOINTS OF LEFT FOOT: Status: ACTIVE | Noted: 2022-10-24

## 2023-03-27 RX ORDER — IBUPROFEN 800 MG/1
TABLET ORAL
COMMUNITY
Start: 2023-03-10

## 2023-03-27 NOTE — PROGRESS NOTES
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[facility] )  This is currently stable  Paroxysmal supraventricular tachycardia ([facility] )  Patient follows with cardiology and is currently stable  Type 2 diabetes mellitus without complication, without long-term current use of insulin ([facility] )  Patient follows with endocrinology  Other orders  -     ibuprofen (MOTRIN) 800 mg tablet        Patient follows with endocrinology for her sugars  They are under control  Her other chronic conditions are stable  She overall is doing well she will continue her current medications  She is up-to-date on health maintenance  She can follow-up with me in 6 months or sooner if needed    Subjective:     Chief Complaint   Patient presents with   • Annual Exam     [address] [facility]        Patient ID: Dustin Gonzalez is a 57 y o  female  Patient presents today for yearly checkup  She reports doing well she is following with endocrinology GI and other specialist   she has no acute complaints today      The following portions of the patient's history were reviewed and updated as appropriate: allergies, current medications, past family history, past medical history, past social history, past surgical history and problem list     Review of Systems   Constitutional: Negative  HENT: Negative  Eyes: Negative  Respiratory: Negative  Cardiovascular: Negative  Gastrointestinal: Negative  Endocrine: Negative  Genitourinary: Negative  Musculoskeletal: Negative  Skin: Negative  Allergic/Immunologic: Negative  Neurological: Negative  Hematological: Negative  Psychiatric/Behavioral: Negative  All other systems reviewed and are negative          Objective:    Vitals:    03/27/23 1357   BP: 136/82   BP Location: Left arm   Patient Position: Sitting   Cuff Size: Standard   Pulse: 102   Resp: 18   Temp: 97 8 °F (36 6 °C) "  TempSrc: Tympanic   SpO2: 98%   Weight: 67 2 kg (148 lb 3 2 oz)   Height: 5' 3\" (1 6 m)          Physical Exam  Vitals and nursing note reviewed  Constitutional:       Appearance: She is well-developed  HENT:      Head: Normocephalic and atraumatic  Right Ear: External ear normal       Left Ear: External ear normal    Eyes:      Conjunctiva/sclera: Conjunctivae normal       Pupils: Pupils are equal, round, and reactive to light  Cardiovascular:      Rate and Rhythm: Normal rate and regular rhythm  Pulses: no weak pulses          Dorsalis pedis pulses are 2+ on the right side and 2+ on the left side  Posterior tibial pulses are 2+ on the right side and 2+ on the left side  Heart sounds: Normal heart sounds  Pulmonary:      Effort: Pulmonary effort is normal       Breath sounds: Normal breath sounds  Abdominal:      General: Bowel sounds are normal       Palpations: Abdomen is soft  Musculoskeletal:         General: Normal range of motion  Cervical back: Normal range of motion  Feet:      Right foot:      Skin integrity: No ulcer, skin breakdown, erythema, warmth, callus or dry skin  Left foot:      Skin integrity: No ulcer, skin breakdown, erythema, warmth, callus or dry skin  Skin:     General: Skin is warm and dry  Neurological:      Mental Status: She is alert and oriented to person, place, and time  Deep Tendon Reflexes: Reflexes are normal and symmetric  Psychiatric:         Behavior: Behavior normal          Thought Content: Thought content normal          Judgment: Judgment normal              Diabetic Foot Exam    Patient's shoes and socks removed  Right Foot/Ankle   Right Foot Inspection  Skin Exam: skin normal  Skin not intact, no dry skin, no warmth, no callus, no erythema, no maceration, no abnormal color, no pre-ulcer, no ulcer and no callus  Toe Exam: ROM and strength within normal limits       Sensory   Vibration: intact  Proprioception: " intact  Monofilament testing: intact    Vascular  Capillary refills: < 3 seconds  The right DP pulse is 2+  The right PT pulse is 2+  Left Foot/Ankle  Left Foot Inspection  Skin Exam: skin normal  Skin not intact, no dry skin, no warmth, no erythema, no maceration, normal color, no pre-ulcer, no ulcer and no callus  Toe Exam: ROM and strength within normal limits  Sensory   Vibration: intact  Proprioception: intact  Monofilament testing: intact    Vascular  Capillary refills: < 3 seconds  The left DP pulse is 2+  The left PT pulse is 2+  Assign Risk Category  No deformity present  No loss of protective sensation  No weak pulses  Risk: 0    BMI Counseling: Body mass index is 26 25 kg/m²  The BMI is above normal  Nutrition recommendations include reducing portion sizes, decreasing overall calorie intake, 3-5 servings of fruits/vegetables daily, reducing fast food intake, consuming healthier snacks, decreasing soda and/or juice intake, moderation in carbohydrate intake, increasing intake of lean protein, reducing intake of saturated fat and trans fat and reducing intake of cholesterol  Exercise recommendations include exercising 3-5 times per week

## 2023-05-05 ENCOUNTER — APPOINTMENT (OUTPATIENT)
Dept: RADIOLOGY | Facility: CLINIC | Age: 63
End: 2023-05-05

## 2023-05-05 ENCOUNTER — OFFICE VISIT (OUTPATIENT)
Dept: OBGYN CLINIC | Facility: CLINIC | Age: 63
End: 2023-05-05

## 2023-05-05 VITALS
HEIGHT: 63 IN | BODY MASS INDEX: 26.22 KG/M2 | DIASTOLIC BLOOD PRESSURE: 81 MMHG | HEART RATE: 96 BPM | WEIGHT: 148 LBS | SYSTOLIC BLOOD PRESSURE: 119 MMHG

## 2023-05-05 DIAGNOSIS — M70.61 GREATER TROCHANTERIC BURSITIS OF RIGHT HIP: Primary | ICD-10-CM

## 2023-05-05 DIAGNOSIS — M25.551 RIGHT HIP PAIN: ICD-10-CM

## 2023-05-05 DIAGNOSIS — M46.1 SACROILIITIS (HCC): ICD-10-CM

## 2023-05-05 DIAGNOSIS — M62.89 HAMSTRING TIGHTNESS: ICD-10-CM

## 2023-05-05 NOTE — PROGRESS NOTES
Hip New Office Note    Assessment:     1  Greater trochanteric bursitis of right hip    2  Hamstring tightness    3  Sacroiliitis ([address] )        Plan:  Findings today are consistent with right greater trochanteric bursitis, IT band and hamstring tightness as well as right sided sacroiliitis  Imaging and prognosis was reviewed with the patient today  Discussed conservative treatment in the form of physical therapy, anti-inflammatories and low impact exercises  A referral to physical therapy was placed today to work on IT band stretching, hamstring stretching as well as core and back strengthening  A referral to Pain Management was placed today to further evaluate right SI joint with possible injections  Patient can continue with OTC anti-inflammatories as needed for pain  Patient can follow up as needed  Problem List Items Addressed This Visit    None  Visit Diagnoses     Greater trochanteric bursitis of right hip    -  Primary    Relevant Orders    XR hip/pelv 2-3 vws right if performed    Ambulatory Referral to Physical Therapy    Hamstring tightness        Relevant Orders    Ambulatory Referral to Physical Therapy    Sacroiliitis [facility])        Relevant Orders    Ambulatory Referral to Physical Therapy    Ambulatory Referral to Pain Management         Subjective:     Patient ID: Kiran Cruz is a 70 y o  female  Patient seen in consultation at the request of Dr Cande Bravo DO    Chief Complaint:  HPI:  The patient presents with a chief complaint of right hip pain  The pain began 3 month(s) ago and is not associated with an acute injury  She notes that she started being more active and working out around the time her symptoms began  She does states she had pain in her hip over a year ago and was treated from greater troch bursitis  The patient describes the pain as aching and 7 out of 10 in intensity    It is constant in timing, and localizes the pain to the posterior and lateral hip, right groin and radiating pain to her knee  The pain is worse with walking, ascending stairs, getting in and out of a car and bending and relieved with rest, heat, medication: Tylenol used and beneficial   Patient has previous CSI to the greater troch bursa- couple weeks of relief   She notes back surgery with Dr Susan Mera at [facility] on 2021    Allergy:  Allergies   Allergen Reactions    Shellfish-Derivedproducts [Shellfish-Derived Products - Food Allergy] Anaphylaxis    Phenytoin Hives    Shellfish Allergy - Food Allergy Other (See Comments)     Medications:  all current active meds have been reviewed  Past Medical History:  Past Medical History:   Diagnosis Date    Anemia     Due to heavy menstruation    Asthma     Seasonal    Diabetes mellitus ([address] )     GERD (gastroesophageal reflux disease)     Could be due to gastroparesis    Hypertension     Pneumonia 2018    Walking pneumonia     Past Surgical History:  Past Surgical History:   Procedure Laterality Date   [address]     [facility] SPINE SURGERY  2018, 2019 and     ACDF 2018, Laminectomy 2019 and fusion    [address] [place] [address] [address]     Family History:  Family History   Problem Relation Age of Onset    Cancer Mother         Unknown    Clotting disorder Mother         Factor XI deficiency    Coronary artery disease Father          at 46 years    Diabetes Father     Hypertension Father     Breast cancer Sister         52's    Cancer Sister         Breast cancer twice    COPD Sister     No Known Problems Daughter     No Known Problems Daughter     No Known Problems Daughter     No Known Problems Daughter     No Known Problems Maternal Grandmother     No Known Problems Maternal Grandfather     No Known Problems Paternal Grandmother     No Known Problems Paternal Grandfather     No Known Problems Maternal Aunt     Breast cancer Paternal Aunt         63's    Cancer Paternal Aunt Breast cancer    Diabetes Paternal Aunt     Hypertension Paternal Aunt     Coronary artery disease Brother         Congestive Heart Failure    Heart failure Brother     Hypertension Brother     Heart failure Paternal Uncle     Hypertension Paternal Uncle     Heart failure Paternal Uncle     Hypertension Paternal Uncle      Social History:  Social History     Substance and Sexual Activity   Alcohol Use Yes    Comment: Occasional     Social History     Substance and Sexual Activity   Drug Use Never     Social History     Tobacco Use   Smoking Status Former    Packs/day: 0 50    Years: 2 00    Pack years: 1 00    Types: Cigarettes    Start date: 1983   [facility] Quit date: 1985    Years since quittin 3   Smokeless Tobacco Never   Tobacco Comments     years ago           ROS:  General: Per HPI  Skin: Negative, except if noted below  HEENT: Negative  Respiratory: Negative  Cardiovascular: Negative  Gastrointestinal: Negative  Urinary: Negative  Vascular: Negative  Musculoskeletal: Positive per HPI   Neurologic: Positive per HPI  Endocrine: Negative    Objective:  BP Readings from Last 1 Encounters:   23 119/81      Wt Readings from Last 1 Encounters:   23 67 1 kg (148 lb)        Respiratory:   non-labored respirations    Lymphatics:  no palpable lymph nodes    Gait and Station:   normal    Neurologic:   Alert and oriented times 3  Patient with normal sensation except as noted below  Deep tendon reflexes 2+ except as noted in MSK exam    Bilateral Lower Extremity:  Right Hip     Inspection: skin intact     Range of Motion: WNL w/o pain    - log roll    - Trendelenburg sign    -SLR    TTP over greater troch    Motor: 5/5 IP/Q/HS/TA/GS    Pulses: 2+ DP / 2+ PT    SILT DP/SP/S/S/TN    Imaging:  My interpretation XR AP pelvis/ right hip: minimal to no joint space narrowing, subchondral sclerosis, subchondral cysts, osteophyte formation  No fracture or dislocation       BMI:   Estimated body mass "index is 26 22 kg/m² as calculated from the following:    Height as of this encounter: 5' 3\" (1 6 m)  Weight as of this encounter: 67 1 kg (148 lb)  BSA:   Estimated body surface area is 1 7 meters squared as calculated from the following:    Height as of this encounter: 5' 3\" (1 6 m)  Weight as of this encounter: 67 1 kg (148 lb)  Scribe Attestation    I,:  Liam Marei am acting as a scribe while in the presence of the attending physician :       I,:  Richi Teixeira DO personally performed the services described in this documentation    as scribed in my presence  :         "

## 2023-05-18 DIAGNOSIS — J45.20 MILD INTERMITTENT ASTHMA, UNSPECIFIED WHETHER COMPLICATED: ICD-10-CM

## 2023-05-18 RX ORDER — BUDESONIDE AND FORMOTEROL FUMARATE DIHYDRATE 160; 4.5 UG/1; UG/1
AEROSOL RESPIRATORY (INHALATION)
Qty: 30.6 G | Refills: 2 | Status: SHIPPED | OUTPATIENT
Start: 2023-05-18

## 2023-05-19 LAB
ALBUMIN SERPL-MCNC: 4.7 G/DL (ref 3.8–4.8)
ALBUMIN/CREAT UR: 10 MG/G CREAT (ref 0–29)
ALBUMIN/GLOB SERPL: 2.4 {RATIO} (ref 1.2–2.2)
ALP SERPL-CCNC: 104 IU/L (ref 44–121)
ALT SERPL-CCNC: 31 IU/L (ref 0–32)
AST SERPL-CCNC: 26 IU/L (ref 0–40)
BASOPHILS # BLD AUTO: 0.1 X10E3/UL (ref 0–0.2)
BASOPHILS NFR BLD AUTO: 1 %
BILIRUB SERPL-MCNC: 0.4 MG/DL (ref 0–1.2)
BUN SERPL-MCNC: 30 MG/DL (ref 8–27)
BUN/CREAT SERPL: 38 (ref 12–28)
CALCIUM SERPL-MCNC: 9.7 MG/DL (ref 8.7–10.3)
CHLORIDE SERPL-SCNC: 108 MMOL/L (ref 96–106)
CHOLEST SERPL-MCNC: 164 MG/DL (ref 100–199)
CO2 SERPL-SCNC: 19 MMOL/L (ref 20–29)
CREAT SERPL-MCNC: 0.8 MG/DL (ref 0.57–1)
CREAT UR-MCNC: 106.4 MG/DL
EGFR: 82 ML/MIN/1.73
EOSINOPHIL # BLD AUTO: 0.4 X10E3/UL (ref 0–0.4)
EOSINOPHIL NFR BLD AUTO: 6 %
ERYTHROCYTE [DISTWIDTH] IN BLOOD BY AUTOMATED COUNT: 13 % (ref 11.7–15.4)
EST. AVERAGE GLUCOSE BLD GHB EST-MCNC: 137 MG/DL
GLOBULIN SER-MCNC: 2 G/DL (ref 1.5–4.5)
GLUCOSE SERPL-MCNC: 131 MG/DL (ref 70–99)
HBA1C MFR BLD: 6.4 % (ref 4.8–5.6)
HCT VFR BLD AUTO: 39.3 % (ref 34–46.6)
HDLC SERPL-MCNC: 43 MG/DL
HGB BLD-MCNC: 12.8 G/DL (ref 11.1–15.9)
IMM GRANULOCYTES # BLD: 0 X10E3/UL (ref 0–0.1)
IMM GRANULOCYTES NFR BLD: 0 %
LDLC SERPL CALC-MCNC: 81 MG/DL (ref 0–99)
LDLC/HDLC SERPL: 1.9 RATIO (ref 0–3.2)
LYMPHOCYTES # BLD AUTO: 2.4 X10E3/UL (ref 0.7–3.1)
LYMPHOCYTES NFR BLD AUTO: 35 %
MCH RBC QN AUTO: 29.9 PG (ref 26.6–33)
MCHC RBC AUTO-ENTMCNC: 32.6 G/DL (ref 31.5–35.7)
MCV RBC AUTO: 92 FL (ref 79–97)
MICROALBUMIN UR-MCNC: 10.7 UG/ML
MONOCYTES # BLD AUTO: 0.5 X10E3/UL (ref 0.1–0.9)
MONOCYTES NFR BLD AUTO: 7 %
NEUTROPHILS # BLD AUTO: 3.6 X10E3/UL (ref 1.4–7)
NEUTROPHILS NFR BLD AUTO: 51 %
PLATELET # BLD AUTO: 240 X10E3/UL (ref 150–450)
POTASSIUM SERPL-SCNC: 4.7 MMOL/L (ref 3.5–5.2)
PROT SERPL-MCNC: 6.7 G/DL (ref 6–8.5)
RBC # BLD AUTO: 4.28 X10E6/UL (ref 3.77–5.28)
SL AMB VLDL CHOLESTEROL CALC: 40 MG/DL (ref 5–40)
SODIUM SERPL-SCNC: 141 MMOL/L (ref 134–144)
TRIGL SERPL-MCNC: 240 MG/DL (ref 0–149)
TSH SERPL DL<=0.005 MIU/L-ACNC: 3.41 UIU/ML (ref 0.45–4.5)
WBC # BLD AUTO: 6.9 X10E3/UL (ref 3.4–10.8)

## 2023-05-22 ENCOUNTER — OFFICE VISIT (OUTPATIENT)
Dept: PULMONOLOGY | Facility: HOSPITAL | Age: 63
End: 2023-05-22

## 2023-05-22 VITALS
BODY MASS INDEX: 26.47 KG/M2 | DIASTOLIC BLOOD PRESSURE: 68 MMHG | SYSTOLIC BLOOD PRESSURE: 118 MMHG | HEIGHT: 63 IN | OXYGEN SATURATION: 98 % | HEART RATE: 92 BPM | WEIGHT: 149.4 LBS

## 2023-05-22 DIAGNOSIS — Z91.09 ENVIRONMENTAL ALLERGIES: ICD-10-CM

## 2023-05-22 DIAGNOSIS — J45.20 MILD INTERMITTENT ASTHMA WITHOUT COMPLICATION: Primary | ICD-10-CM

## 2023-05-22 NOTE — PROGRESS NOTES
"Pulmonary Follow-Up Note   Bj Houston 70 y o  female MRN: [id number]  5/22/2023      Assessment/Plan:    Problem List Items Addressed This Visit        Respiratory    Asthma - Primary     She is doing well using Symbicort about once monthly  We did discuss de-escalation to the albuterol inhaler; however, she felt the Symbicort inhaler was working for her  She recently transitioned to Medicare and she is unsure if cost will change for the inhaler  It is on the more expensive side at present despite being used so infrequently  When she is due for her next refill, she will obtain pricing from the pharmacy  She can call our office if we need to change regimens  She also wishes to follow-up with her PCP or an allergist if possible  Given her limited symptoms and inhaler use, this is reasonable  We did give her a referral to a local allergist because she is also looking to reevaluate her lobster allergy  Other    Environmental allergies     Will follow-up with her PCP and has also been referred to a local allergist at her request   No need for any injectable therapies from a pulmonary standpoint at this time  Education provided at this visit:   Need for Vaccination: Up-to-date on flu and pneumonia vaccines   Smoking Cessation: Quit 1985  Continued complete cessation  Inhaler Use: Reiterated proper use and technique    Return in about 6 months (around 11/22/2023), or if symptoms worsen or fail to improve  All of Jada's questions were answered prior to leaving the office today  She will follow-up with Dr Nish Ravi in six months or sooner should the need arise  She is aware to call our office with any further questions or concerns  History of Present Illness   Reason for Visit: Follow-up  Chief Complaint: \"I feel good  \"  HPI: Bj Houston is a 70 y o  female who presents to the office today for follow-up of asthma  Overall, she reports she is doing well    She does have some intermittent " allergic symptoms including rhinitis and mild cough  She reports that when she uses the Symbicort, it helps with this  She uses the Symbicort 2 puffs at a time and uses the inhaler as needed, which has only been a few times in the last few months  She does find it beneficial when she does use it   from the above, she has no other complaints  When she relocated to the area, she did not establish care with an allergist for her lobster allergy  She is hoping to have that cared for as well  Review of Systems   All other systems reviewed and are negative  A full 12-point review of systems was completed and is negative except for those outlined in the HPI      Historical Information   Past Medical History:   Diagnosis Date   • Anemia     Due to heavy menstruation   • Asthma     Seasonal   • Diabetes mellitus ([facility] [address] )    • GERD (gastroesophageal reflux disease)     Could be due to gastroparesis   • Hypertension 2005   • Pneumonia 2018    Walking pneumonia     Past Surgical History:   Procedure Laterality Date   • ADENOIDECTOMY  1965   • CHOLECYSTECTOMY  2006   • [address]   , 2019 and     ACDF 2018, Laminectomy  and fusion    • TONSILLECTOMY  1965     Family History   Problem Relation Age of Onset   • Cancer Mother         Unknown   • Clotting disorder Mother         Factor XI deficiency   • Coronary artery disease Father          at 46 years   • Diabetes Father    • Hypertension Father    • Breast cancer Sister         52's   • Cancer Sister         Breast cancer twice   • COPD Sister    • No Known Problems Daughter    • No Known Problems Daughter    • No Known Problems Daughter    • No Known Problems Daughter    • No Known Problems Maternal Grandmother    • No Known Problems Maternal Grandfather    • No Known Problems Paternal Grandmother    • No Known Problems Paternal Grandfather    • No Known Problems Maternal Aunt    • Breast cancer Paternal Aunt         63's   • Cancer Paternal Aunt         Breast cancer   • Diabetes Paternal Aunt    • Hypertension Paternal Aunt    • Coronary artery disease Brother         Congestive Heart Failure   • Heart failure Brother    • Hypertension Brother    • Heart failure Paternal Uncle    • Hypertension Paternal Uncle    • Heart failure Paternal Uncle    • Hypertension Paternal Uncle      Social History   Social History     Substance and Sexual Activity   Alcohol Use Yes    Comment: Occasional     Social History     Substance and Sexual Activity   Drug Use Never     Social History     Tobacco Use   Smoking Status Former   • Packs/day: 0 50   • Years: 2 00   • Pack years: 1 00   • Types: Cigarettes   • Start date: 1983   • Quit date: 1985   • Years since quittin 4   Smokeless Tobacco Never   Tobacco Comments     years ago     E-Cigarette/Vaping   • E-Cigarette Use Never User      E-Cigarette/Vaping Substances   • Nicotine No    • THC No    • CBD No    • Flavoring No    • Other No    • Unknown No        Meds/Allergies     Current Outpatient Medications:   •  atorvastatin (LIPITOR) 40 mg tablet, Take 40 mg by mouth daily  , Disp: , Rfl:   •  dapagliflozin (Farxiga) 10 MG tablet, Take 1 tablet (10 mg total) by mouth daily, Disp: 90 tablet, Rfl: 1  •  EPINEPHrine (EPIPEN) 0 3 mg/0 3 mL SOAJ, Inject 0 3 mL (0 3 mg total) into a muscle once for 1 dose, Disp: 0 6 mL, Rfl: 0  •  glimepiride (AMARYL) 4 mg tablet, 1 tab bid, Disp: 180 tablet, Rfl: 3  •  glucose blood (OneTouch Ultra) test strip, Daily  , Disp: 100 each, Rfl: 3  •  ibuprofen (MOTRIN) 800 mg tablet, , Disp: , Rfl:   •  Januvia 100 MG tablet, 1 tab daily, Disp: 90 tablet, Rfl: 3  •  lisinopril (ZESTRIL) 20 mg tablet, Take 20 mg by mouth daily, Disp: , Rfl:   •  metFORMIN (GLUCOPHAGE) 1000 MG tablet, Take 1 tablet (1,000 mg total) by mouth 2 (two) times a day with meals, Disp: 180 tablet, Rfl: 3  •  Multiple Vitamin (MULTIVITAMIN ADULT PO), Take 1 tablet by mouth daily, Disp: , "Rfl:   •  Omega-3 Fatty Acids (Fish Oil) 1200 MG CAPS, , Disp: , Rfl:   •  omeprazole (PriLOSEC) 40 MG capsule, , Disp: , Rfl:   •  Rhubarb (ESTROVEN MENOPAUSE RELIEF PO), Take 1 tablet by mouth daily, Disp: , Rfl:   •  Symbicort 160-4 5 MCG/ACT inhaler, INHALE 2 PUFFS BY MOUTH 2 TIMES A DAY RINSE MOUTH AFTER USE , Disp: 30 6 g, Rfl: 2  •  TURMERIC PO, Take 500 mg by mouth daily, Disp: , Rfl:   •  albuterol (PROVENTIL HFA,VENTOLIN HFA) 90 mcg/act inhaler, , Disp: , Rfl:   Allergies   Allergen Reactions   • Shellfish-Derivedproducts [Shellfish-Derived Products - Food Allergy] Anaphylaxis   • Phenytoin Hives   • Shellfish Allergy - Food Allergy Other (See Comments)       Vitals: Blood pressure 118/68, pulse 92, height 5' 3\" (1 6 m), weight 67 8 kg (149 lb 6 4 oz), SpO2 98 %  Body mass index is 26 47 kg/m²  Oxygen Therapy  SpO2: 98 %  Oxygen Therapy: None (Room air)    Physical Exam:  Physical Exam  Vitals reviewed  Constitutional:       General: She is not in acute distress  Appearance: She is well-developed  She is not toxic-appearing or diaphoretic  HENT:      Head: Normocephalic and atraumatic  Eyes:      General: No scleral icterus  Neck:      Trachea: No tracheal deviation  Cardiovascular:      Rate and Rhythm: Normal rate and regular rhythm  Heart sounds: S1 normal and S2 normal  No murmur heard  No friction rub  No gallop  Pulmonary:      Effort: Pulmonary effort is normal  No tachypnea, accessory muscle usage or respiratory distress  Breath sounds: Normal breath sounds  No stridor  No decreased breath sounds, wheezing, rhonchi or rales  Chest:      Chest wall: No tenderness  Abdominal:      General: Bowel sounds are normal  There is no distension  Palpations: Abdomen is soft  Tenderness: There is no abdominal tenderness  Musculoskeletal:      Cervical back: Neck supple  Right lower leg: No edema  Left lower leg: No edema     Skin:     General: Skin is warm " "and dry  Findings: No rash  Neurological:      Mental Status: She is alert and oriented to person, place, and time  GCS: GCS eye subscore is 4  GCS verbal subscore is 5  GCS motor subscore is 6  Psychiatric:         Speech: Speech normal          Behavior: Behavior normal  Behavior is cooperative  Labs: [address] [place] [address] allergy panel shows various environmental allergies as discussed    Imaging and other studies: I have personally reviewed pertinent reports  and Chest x-ray done October 2022 did not reveal any acute infiltrate  Pulmonary Results (PFTs, PSG): PFTs showed normal spirometry and diffusing capacity with normal flow volume loop  ERI Upton  [facility]        Portions of the record may have been created with voice recognition software  Occasional wrong word or \"sound a like\" substitutions may have occurred due to the inherent limitations of voice recognition software  Read the chart carefully and recognize, using context, where substitutions have occurred or contact the dictating provider    "

## 2023-05-22 NOTE — ASSESSMENT & PLAN NOTE
She is doing well using Symbicort about once monthly  We did discuss de-escalation to the albuterol inhaler; however, she felt the Symbicort inhaler was working for her  She recently transitioned to Medicare and she is unsure if cost will change for the inhaler  It is on the more expensive side at present despite being used so infrequently  When she is due for her next refill, she will obtain pricing from the pharmacy  She can call our office if we need to change regimens  She also wishes to follow-up with her PCP or an allergist if possible  Given her limited symptoms and inhaler use, this is reasonable  We did give her a referral to a local allergist because she is also looking to reevaluate her lobster allergy

## 2023-05-22 NOTE — ASSESSMENT & PLAN NOTE
Will follow-up with her PCP and has also been referred to a local allergist at her request   No need for any injectable therapies from a pulmonary standpoint at this time

## 2023-05-24 ENCOUNTER — OFFICE VISIT (OUTPATIENT)
Dept: ENDOCRINOLOGY | Facility: CLINIC | Age: 63
End: 2023-05-24

## 2023-05-24 VITALS
HEIGHT: 63 IN | DIASTOLIC BLOOD PRESSURE: 72 MMHG | WEIGHT: 148.2 LBS | BODY MASS INDEX: 26.26 KG/M2 | HEART RATE: 68 BPM | SYSTOLIC BLOOD PRESSURE: 108 MMHG

## 2023-05-24 DIAGNOSIS — E11.42 TYPE 2 DIABETES MELLITUS WITH DIABETIC POLYNEUROPATHY, WITHOUT LONG-TERM CURRENT USE OF INSULIN (HCC): ICD-10-CM

## 2023-05-24 DIAGNOSIS — E78.5 HYPERLIPIDEMIA, UNSPECIFIED HYPERLIPIDEMIA TYPE: ICD-10-CM

## 2023-05-24 DIAGNOSIS — I10 HYPERTENSION, UNSPECIFIED TYPE: Primary | ICD-10-CM

## 2023-05-24 RX ORDER — BLOOD SUGAR DIAGNOSTIC
STRIP MISCELLANEOUS
Qty: 100 EACH | Refills: 3 | Status: SHIPPED | OUTPATIENT
Start: 2023-05-24 | End: 2023-05-24 | Stop reason: SDUPTHER

## 2023-05-24 RX ORDER — SITAGLIPTIN 100 MG/1
TABLET, FILM COATED ORAL
Qty: 90 TABLET | Refills: 3 | Status: CANCELLED | OUTPATIENT
Start: 2023-05-24

## 2023-05-24 RX ORDER — BLOOD SUGAR DIAGNOSTIC
STRIP MISCELLANEOUS
Qty: 100 EACH | Refills: 3 | Status: SHIPPED | OUTPATIENT
Start: 2023-05-24

## 2023-05-24 RX ORDER — BLOOD-GLUCOSE METER
KIT MISCELLANEOUS
Qty: 1 KIT | Refills: 0 | Status: SHIPPED | OUTPATIENT
Start: 2023-05-24

## 2023-05-24 RX ORDER — LANCETS
EACH MISCELLANEOUS
Qty: 100 EACH | Refills: 0 | Status: SHIPPED | OUTPATIENT
Start: 2023-05-24

## 2023-05-24 RX ORDER — GLIMEPIRIDE 4 MG/1
TABLET ORAL
Qty: 180 TABLET | Refills: 3 | Status: SHIPPED | OUTPATIENT
Start: 2023-05-24

## 2023-05-24 NOTE — PROGRESS NOTES
5/24/2023    Assessment/Plan      Diagnoses and all orders for this visit:    Hypertension, unspecified type  -     Hemoglobin A1C; Future  -     Comprehensive metabolic panel; Future  -     Lipid Panel with Direct LDL reflex; Future  -     TSH, 3rd generation; Future  -     Blood Glucose Monitoring Suppl (ONE TOUCH ULTRA MINI) w/Device KIT; As directed  -     Hemoglobin A1C  -     Comprehensive metabolic panel  -     Lipid Panel with Direct LDL reflex  -     TSH, 3rd generation    Type 2 diabetes mellitus with diabetic polyneuropathy, without long-term current use of insulin (HCC)  -     dapagliflozin (Farxiga) 10 MG tablet; Take 1 tablet (10 mg total) by mouth daily  -     metFORMIN (GLUCOPHAGE) 1000 MG tablet; Take 1 tablet (1,000 mg total) by mouth 2 (two) times a day with meals  -     Discontinue: glucose blood (OneTouch Ultra) test strip; Daily   -     glimepiride (AMARYL) 4 mg tablet; 1 tab bid  -     Hemoglobin A1C; Future  -     Comprehensive metabolic panel; Future  -     Lipid Panel with Direct LDL reflex; Future  -     TSH, 3rd generation; Future  -     Blood Glucose Monitoring Suppl (ONE TOUCH ULTRA MINI) w/Device KIT; As directed  -     Hemoglobin A1C  -     Comprehensive metabolic panel  -     Lipid Panel with Direct LDL reflex  -     TSH, 3rd generation  -     glucose blood (OneTouch Ultra) test strip; Daily   -     Lancets (onetouch ultrasoft) lancets; daily    Hyperlipidemia, unspecified hyperlipidemia type  -     Hemoglobin A1C; Future  -     Comprehensive metabolic panel; Future  -     Lipid Panel with Direct LDL reflex; Future  -     TSH, 3rd generation; Future  -     Blood Glucose Monitoring Suppl (ONE TOUCH ULTRA MINI) w/Device KIT; As directed  -     Hemoglobin A1C  -     Comprehensive metabolic panel  -     Lipid Panel with Direct LDL reflex  -     TSH, 3rd generation        Assessment/Plan:  1  Type 2 diabetes: Overall well controlled    We discussed that given some more frequent hypoglycemia, reducing glimepiride dose may be reasonable however given cost and coverage of Januvia, we will start by discontinuing Januvia and keeping glimepiride, metformin, Layman Ion as is  Reviewed blood sugar data over the next few weeks or call sooner with any questions or concerns such as hypoglycemia  Repeat labs prior to next point which should be in 4 months  2   Hyperlipidemia: Continue statin  3   Hypertension: Continue lisinopril  CC: Diabetes follow-up    History of Present Illness     HPI: Sudeep Bower is a 70y o  year old female with type 2 diabetes for about 15 years  She is on oral agents at home and takes metformin 1000 mg twice daily, Januvia 100 mg daily, glimepiride 4 mg twice daily, Farxiga 10 mg daily  She denies any polyuria, polydipsia, nocturia and blurry vision  She denies neuropathy, nephropathy, retinopathy and heart attack but does admit to history of TIA in the past   In the past he did not tolerate Ozempic  Hypoglycemic episodes: Yes occurring later in the day reviewed fast acting carbohydrates nearby at all times in the rule of 15  The patient's last eye exam was in Fall 2022  The patient's last foot exam was in March 2023 with family physician  Blood Sugar/Glucometer/Pump/CGM review: Blood sugars occasionally are high due to diet and intermittently low  Majority of blood sugars on average are reasonably well controlled through review of glucometer readings  Hyperlipidemia: Atorvastatin  Hypertension: Lisinopril  Review of Systems   Constitutional: Negative for fatigue  HENT: Negative for trouble swallowing and voice change  Eyes: Negative for visual disturbance  Respiratory: Negative for shortness of breath  Cardiovascular: Negative for palpitations and leg swelling  Gastrointestinal: Negative for abdominal pain, nausea and vomiting  Endocrine: Negative for polydipsia and polyuria     Musculoskeletal: Negative for arthralgias and myalgias  Skin: Negative for rash  Neurological: Negative for dizziness, tremors and weakness  Hematological: Negative for adenopathy  Psychiatric/Behavioral: Negative for agitation and confusion         Historical Information   Past Medical History:   Diagnosis Date   • Anemia     Due to heavy menstruation   • Asthma     Seasonal   • Diabetes mellitus ([facility] [address] )    • GERD (gastroesophageal reflux disease)     Could be due to gastroparesis   • Hypertension 2005   • Pneumonia 2018    Walking pneumonia     Past Surgical History:   Procedure Laterality Date   • ADENOIDECTOMY  1965   • CHOLECYSTECTOMY  2006   • [address]   2018, 2019 and     ACDF 2018, Laminectomy 2019 and fusion    • TONSILLECTOMY  1965     Social History   Social History     Substance and Sexual Activity   Alcohol Use Yes    Comment: Occasional     Social History     Substance and Sexual Activity   Drug Use Never     Social History     Tobacco Use   Smoking Status Former   • Packs/day: 0 50   • Years: 2 00   • Total pack years: 1 00   • Types: Cigarettes   • Start date: 1983   • Quit date: 1985   • Years since quittin 4   Smokeless Tobacco Never   Tobacco Comments     years ago     Family History:   Family History   Problem Relation Age of Onset   • Cancer Mother         Unknown   • Clotting disorder Mother         Factor XI deficiency   • Coronary artery disease Father          at 46 years   • Diabetes Father    • Hypertension Father    • Diabetes type II Father    • Breast cancer Sister         52's   • Cancer Sister         Breast cancer twice   • COPD Sister    • No Known Problems Daughter    • No Known Problems Daughter    • No Known Problems Daughter    • No Known Problems Daughter    • No Known Problems Maternal Grandmother    • No Known Problems Maternal Grandfather    • No Known Problems Paternal Grandmother    • No Known Problems Paternal Grandfather    • No Known Problems Maternal Aunt    • Breast cancer Paternal Aunt         63's   • Cancer Paternal Aunt         Breast cancer   • Diabetes Paternal Aunt    • Hypertension Paternal Aunt    • Coronary artery disease Brother         Congestive Heart Failure   • Heart failure Brother    • Hypertension Brother    • Heart failure Paternal Uncle    • Hypertension Paternal Uncle    • Heart failure Paternal Uncle    • Hypertension Paternal Uncle    • Diabetes type II Family         Daughter   • Hypothyroidism Family         Daughter       Meds/Allergies   Current Outpatient Medications   Medication Sig Dispense Refill   • atorvastatin (LIPITOR) 40 mg tablet Take 40 mg by mouth daily       • Blood Glucose Monitoring Suppl (ONE TOUCH ULTRA MINI) w/Device KIT As directed 1 kit 0   • dapagliflozin (Farxiga) 10 MG tablet Take 1 tablet (10 mg total) by mouth daily 90 tablet 3   • EPINEPHrine (EPIPEN) 0 3 mg/0 3 mL SOAJ Inject 0 3 mL (0 3 mg total) into a muscle once for 1 dose 0 6 mL 0   • glimepiride (AMARYL) 4 mg tablet 1 tab bid  180 tablet 3   • glucose blood (OneTouch Ultra) test strip Daily  100 each 3   • ibuprofen (MOTRIN) 800 mg tablet      • Lancets (onetouch ultrasoft) lancets daily 100 each 0   • lisinopril (ZESTRIL) 20 mg tablet Take 20 mg by mouth daily     • metFORMIN (GLUCOPHAGE) 1000 MG tablet Take 1 tablet (1,000 mg total) by mouth 2 (two) times a day with meals 180 tablet 3   • Multiple Vitamin (MULTIVITAMIN ADULT PO) Take 1 tablet by mouth daily     • Omega-3 Fatty Acids (Fish Oil) 1200 MG CAPS      • omeprazole (PriLOSEC) 40 MG capsule      • Rhubarb (ESTROVEN MENOPAUSE RELIEF PO) Take 1 tablet by mouth daily     • Symbicort 160-4 5 MCG/ACT inhaler INHALE 2 PUFFS BY MOUTH 2 TIMES A DAY RINSE MOUTH AFTER USE  30 6 g 2   • TURMERIC PO Take 500 mg by mouth daily       No current facility-administered medications for this visit       Allergies   Allergen Reactions   • Shellfish-Derivedproducts [Shellfish-Derived Products - Food Allergy] Anaphylaxis   • "Phenytoin Hives   • Shellfish Allergy - Food Allergy Other (See Comments)       Objective   Vitals: Blood pressure 108/72, pulse 68, height 5' 3\" (1 6 m), weight 67 2 kg (148 lb 3 2 oz)  Invasive Devices     None                 Physical Exam  Vitals reviewed  Constitutional:       General: She is not in acute distress  Appearance: She is well-developed  She is not diaphoretic  HENT:      Head: Normocephalic and atraumatic  Eyes:      Conjunctiva/sclera: Conjunctivae normal       Pupils: Pupils are equal, round, and reactive to light  Neck:      Thyroid: No thyromegaly  Cardiovascular:      Rate and Rhythm: Normal rate and regular rhythm  Pulmonary:      Effort: Pulmonary effort is normal  No respiratory distress  Breath sounds: Normal breath sounds  Abdominal:      General: Bowel sounds are normal       Palpations: Abdomen is soft  Musculoskeletal:         General: Normal range of motion  Cervical back: Normal range of motion and neck supple  Skin:     General: Skin is warm and dry  Findings: No rash  Neurological:      Mental Status: She is alert and oriented to person, place, and time  Motor: No abnormal muscle tone  Psychiatric:         Behavior: Behavior normal          The history was obtained from the review of the chart and from the patient      Lab Results:    Most recent Alc is  Lab Results   Component Value Date    HGBA1C 6 4 (H) 05/18/2023           No components found for: \"HA1C\"  No components found for: \"GLU\"    Lab Results   Component Value Date    BUN 30 (H) 05/18/2023     (H) 05/18/2023    CO2 19 (L) 05/18/2023    CREATININE 0 80 05/18/2023    CREATININE 0 82 12/29/2022    CREATININE 0 83 12/07/2022    K 4 7 05/18/2023     eGFR   Date Value Ref Range Status   05/18/2023 82 >59 mL/min/1 73 Final   12/29/2022 75 ml/min/1 73sq m Final     No components found for: \"MALBCRER\"    Lab Results   Component Value Date    HDL 43 05/18/2023    TRIG 240 (H) " "05/18/2023       Lab Results   Component Value Date    ALKPHOS 95 12/07/2022    ALT 31 05/18/2023    AST 26 05/18/2023       Lab Results   Component Value Date    TSH 3 410 05/18/2023           Future Appointments   Date Time Provider Tatianan Raisa   9/26/2023 10:30 AM Lit Larios PA-C [facility]   11/6/2023 10:00 AM DO [facility]   3/28/2024  8:30 AM Milly Heimlich, DO [facility]       Portions of the record may have been created with voice recognition software  Occasional wrong word or \"sound a like\" substitutions may have occurred due to the inherent limitations of voice recognition software  Read the chart carefully and recognize, using context, where substitutions have occurred      "

## 2023-05-25 ENCOUNTER — TELEPHONE (OUTPATIENT)
Dept: ADMINISTRATIVE | Facility: OTHER | Age: 63
End: 2023-05-25

## 2023-05-25 NOTE — LETTER
Diabetic Eye Exam Form    Date Requested: 23  Patient: Malia Bernard  Patient : 1960   Referring Provider: Stacy Hernandez DO      DIABETIC Eye Exam Date _______________________________      Type of Exam MUST be documented for Diabetic Eye Exams  Please CHECK ONE  Retinal Exam       Dilated Retinal Exam       OCT       Optomap-Iris Exam      Fundus Photography       Left Eye - Please check Retinopathy or No Retinopathy        Exam did show retinopathy    Exam did not show retinopathy       Right Eye - Please check Retinopathy or No Retinopathy       Exam did show retinopathy    Exam did not show retinopathy       Comments __________________________________________________________    Practice Providing Exam ______________________________________________    Exam Performed By (print name) _______________________________________      Provider Signature ___________________________________________________      These reports are needed for  compliance  Please fax this completed form and a copy of the Diabetic Eye Exam report to our office located at [place] [address] as soon as possible via Fax [phone] attention Miguel A Goon: Phone [phone]  We thank you for your assistance in treating our mutual patient

## 2023-05-25 NOTE — LETTER
Diabetic Eye Exam Form    Date Requested: 23  Patient: Marietta Portuguese  Patient : 1960   Referring Provider: Phyllis Domingo DO      DIABETIC Eye Exam Date _______________________________      Type of Exam MUST be documented for Diabetic Eye Exams  Please CHECK ONE  Retinal Exam       Dilated Retinal Exam       OCT       Optomap-Iris Exam      Fundus Photography       Left Eye - Please check Retinopathy or No Retinopathy        Exam did show retinopathy    Exam did not show retinopathy       Right Eye - Please check Retinopathy or No Retinopathy       Exam did show retinopathy    Exam did not show retinopathy       Comments __________________________________________________________    Practice Providing Exam ______________________________________________    Exam Performed By (print name) _______________________________________      Provider Signature ___________________________________________________      These reports are needed for  compliance  Please fax this completed form and a copy of the Diabetic Eye Exam report to our office located at [place] [address] as soon as possible via Fax [phone] attention Leticia Foreignilder: Phone [phone]  We thank you for your assistance in treating our mutual patient

## 2023-05-25 NOTE — LETTER
Diabetic Eye Exam Form    Date Requested: 23  Patient: Jeaneth Hernadez  Patient : 1960   Referring Provider: Valerie Garcia DO      DIABETIC Eye Exam Date _______________________________      Type of Exam MUST be documented for Diabetic Eye Exams  Please CHECK ONE  Retinal Exam       Dilated Retinal Exam       OCT       Optomap-Iris Exam      Fundus Photography       Left Eye - Please check Retinopathy or No Retinopathy        Exam did show retinopathy    Exam did not show retinopathy       Right Eye - Please check Retinopathy or No Retinopathy       Exam did show retinopathy    Exam did not show retinopathy       Comments __________________________________________________________    Practice Providing Exam ______________________________________________    Exam Performed By (print name) _______________________________________      Provider Signature ___________________________________________________      These reports are needed for  compliance  Please fax this completed form and a copy of the Diabetic Eye Exam report to our office located at [place] [address] as soon as possible via Fax [phone] stacy Haro: Phone [phone]  We thank you for your assistance in treating our mutual patient

## 2023-05-25 NOTE — TELEPHONE ENCOUNTER
----- Message from Grant Singer MA sent at 5/24/2023  1:12 PM EDT -----  Regarding: DM eye exam  05/24/23 1:12 PM    Hello, our patient Marietta Lucero has had Diabetic Foot Exam completed/performed  Please assist in updating the patient chart by making an External outreach to [facility] facility located in [place], [place]  The date of service is 11/15/22      Thank you,  Grant Singer MA  [facility]

## 2023-05-26 NOTE — TELEPHONE ENCOUNTER
Upon review of the In Basket request and the patient's chart, initial outreach has been made via fax to facility  Please see Contacts section for details       Thank you  Sulma Silverio MA

## 2023-05-31 DIAGNOSIS — I10 HYPERTENSION, UNSPECIFIED TYPE: ICD-10-CM

## 2023-05-31 DIAGNOSIS — K21.00 GASTROESOPHAGEAL REFLUX DISEASE WITH ESOPHAGITIS, UNSPECIFIED WHETHER HEMORRHAGE: Primary | ICD-10-CM

## 2023-05-31 DIAGNOSIS — E78.5 HYPERLIPIDEMIA, UNSPECIFIED HYPERLIPIDEMIA TYPE: ICD-10-CM

## 2023-05-31 RX ORDER — ATORVASTATIN CALCIUM 40 MG/1
40 TABLET, FILM COATED ORAL DAILY
Qty: 90 TABLET | Refills: 0 | Status: SHIPPED | OUTPATIENT
Start: 2023-05-31

## 2023-05-31 RX ORDER — LISINOPRIL 20 MG/1
20 TABLET ORAL DAILY
Qty: 90 TABLET | Refills: 1 | Status: SHIPPED | OUTPATIENT
Start: 2023-05-31

## 2023-05-31 RX ORDER — OMEPRAZOLE 40 MG/1
40 CAPSULE, DELAYED RELEASE ORAL DAILY
Qty: 90 CAPSULE | Refills: 1 | Status: SHIPPED | OUTPATIENT
Start: 2023-05-31

## 2023-06-05 NOTE — TELEPHONE ENCOUNTER
As a follow-up, a second attempt has been made for outreach via fax to facility  Please see Contacts section for details      Thank you  Rowan Preciado MA

## 2023-06-06 ENCOUNTER — CONSULT (OUTPATIENT)
Dept: PAIN MEDICINE | Facility: CLINIC | Age: 63
End: 2023-06-06
Payer: MEDICARE

## 2023-06-06 VITALS
HEIGHT: 63 IN | TEMPERATURE: 98 F | BODY MASS INDEX: 26.22 KG/M2 | HEART RATE: 88 BPM | SYSTOLIC BLOOD PRESSURE: 118 MMHG | DIASTOLIC BLOOD PRESSURE: 78 MMHG | WEIGHT: 148 LBS

## 2023-06-06 DIAGNOSIS — M46.1 SACROILIITIS (HCC): ICD-10-CM

## 2023-06-06 DIAGNOSIS — M25.551 HIP PAIN, RIGHT: Primary | ICD-10-CM

## 2023-06-06 DIAGNOSIS — M96.1 LUMBAR POST-LAMINECTOMY SYNDROME: ICD-10-CM

## 2023-06-06 DIAGNOSIS — E11.9 TYPE 2 DIABETES MELLITUS WITHOUT COMPLICATION, WITHOUT LONG-TERM CURRENT USE OF INSULIN (HCC): ICD-10-CM

## 2023-06-06 PROCEDURE — 99204 OFFICE O/P NEW MOD 45 MIN: CPT | Performed by: ANESTHESIOLOGY

## 2023-06-06 NOTE — PROGRESS NOTES
Assessment  1  Hip pain, right    2  Sacroiliitis ([address] )    3  Lumbar post-laminectomy syndrome    4  Type 2 diabetes mellitus without complication, without long-term current use of insulin ([address] )        Plan    The patient's low back pain persists despite time, relative rest, activity modification and therapy  Based on the patient's symptoms and examination, I suspect that her pain is being generated by the sacroiliac joint  I will schedule Jada for intra-articular sacroiliac joint steroid injection under fluoroscopic guidance to address any inflammatory component of the pain  This would serve both diagnostic and hopefully therapeutic purposes  If the patient does not receive significant relief following the injection, it is possible that there is another pain source as the sacroiliac joint is a common pain referral site  It is also possible that the pain is being manifested by an extra-articular sacroiliac pain generator which would not be addressed with an intra-articular injection  In addition she has greater trochanteric tenderness we will pursue greater trochanteric bursa injection under fluoroscopic guidance  Given the patient's history of diabetes, there may be an increased risk of infection in association with the procedure although the overall risk is low  In addition, following the injection there may be a transient elevation in serum glucose levels for several days  The patient understands that this may require additional glycemic coverage in coordination with the treating physician  Regards to her chronic low back and lower extremity pain  She may be candidates for spinal cord stimulation but will reevaluate  In the office today, we reviewed the nature of the patient's pathology in depth using diagrams and models  We discussed the approach I would use for the sacroiliac joint injection and provided literature for home review   The patient understands the risks associated with the procedure including bleeding, infection, tissue injury, allergic reaction decreased immunity secondary to steroid injection, and paralysis and provided written and verbal consent in the office today  My impressions and treatment recommendations were discussed in detail with the patient who verbalized understanding and had no further questions  Discharge instructions were provided  I personally saw and examined the patient and I agree with the above discussed plan of care  This note is created using dictation transcription  It may contain typographical errors, grammatical errors, improperly dictated words, background noise and other errors  Orders Placed This Encounter   Procedures   • FL spine and pain procedure     Standing Status:   Future     Standing Expiration Date:   6/6/2027     Order Specific Question:   Reason for Exam:     Answer:   rigth GT bursa and right SI joint injection     Order Specific Question:   Anticoagulant hold needed? Answer:   no     No orders of the defined types were placed in this encounter  Referred By: Daina Mc DO  History of Present Illness    Shruthi Van is a 70 y o  female 4-month history of right hip pain and right-sided low back pain  She was referred from orthopedics as she initially thought her pain was related to her hip  Her pain is moderate to severe she rates it as 6-7 out of 10 on the visual analog scale despite physical therapy which interferes with her daily living activities  Pain is constant described as cramping shooting with a numbing and throbbing sensation with subjective weakness of the lower limbs  Standing bending sitting and walking all increase her symptoms while lying down decreases her pain  She is undergone multiple pain treatments including physical therapy and surgery for her low back  Nuys any loss of bowel or bladder function      I have personally reviewed and/or updated the patient's past medical history, past surgical history, family history, social history, current medications, allergies, and vital signs today  Review of Systems   Constitutional: Negative for fever and unexpected weight change  HENT: Negative for trouble swallowing  Eyes: Negative for visual disturbance  Respiratory: Negative for shortness of breath and wheezing  Cardiovascular: Negative for chest pain and palpitations  Gastrointestinal: Positive for abdominal pain  Negative for constipation, diarrhea, nausea and vomiting  Endocrine: Negative for cold intolerance, heat intolerance and polydipsia  Genitourinary: Negative for difficulty urinating and frequency  Musculoskeletal: Negative for arthralgias, gait problem, joint swelling and myalgias  Skin: Negative for rash  Neurological: Positive for numbness  Negative for dizziness, seizures, syncope, weakness and headaches  Hematological: Does not bruise/bleed easily  Psychiatric/Behavioral: Negative for dysphoric mood  All other systems reviewed and are negative        Patient Active Problem List   Diagnosis   • Asthma   • Diabetes mellitus ([facility] )   • Family history of anesthesia complication   • Gastroparesis   • History of seizure   • Hyperlipidemia   • Hypertension   • Lumbar stenosis   • Cervical post-laminectomy syndrome   • 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[facility] )   • Gastro-esophageal reflux disease with esophagitis   • Paroxysmal supraventricular tachycardia (HCC)   • Pain in left ankle and joints of left foot   • Environmental allergies       Past Medical History:   Diagnosis Date   • Anemia 2008    Due to heavy menstruation   • Arthritis    • Asthma 2005    Seasonal   • Diabetes mellitus ([facility] ) 2006   • GERD (gastroesophageal reflux disease) 2008    Could be due to gastroparesis   • Hyperlipidemia    • Hypertension 2005   • Pneumonia 2018    Walking pneumonia       Past Surgical History:   Procedure Laterality Date   • ADENOIDECTOMY  1965   • CHOLECYSTECTOMY  2006   • [address]  2018,  and     ACDF 2018, Laminectomy 2019 and fusion    • TONSILLECTOMY  1965       Family History   Problem Relation Age of Onset   • Cancer Mother         Unknown   • Clotting disorder Mother         Factor XI deficiency   • Coronary artery disease Father          at 46 years   • Diabetes Father    • Hypertension Father    • Diabetes type II Father    • Breast cancer Sister         52's   • Cancer Sister         Breast cancer twice   • COPD Sister    • No Known Problems Daughter    • No Known Problems Daughter    • No Known Problems Daughter    • No Known Problems Daughter    • No Known Problems Maternal Grandmother    • No Known Problems Maternal Grandfather    • No Known Problems Paternal Grandmother    • No Known Problems Paternal Grandfather    • No Known Problems Maternal Aunt    • Breast cancer Paternal Aunt         63's   • Cancer Paternal Aunt         Breast cancer   • Diabetes Paternal Aunt    • Hypertension Paternal Aunt    • Coronary artery disease Brother         Congestive Heart Failure   • Heart failure Brother    • Hypertension Brother    • Heart failure Paternal Uncle    • Hypertension Paternal Uncle    • Heart failure Paternal Uncle    • Hypertension Paternal Uncle    • Diabetes type II Family         Daughter   • Hypothyroidism Family         Daughter       Social History     Occupational History   • Not on file   Tobacco Use   • Smoking status: Former     Packs/day: 0 50     Years: 2 00     Total pack years: 1 00     Types: Cigarettes     Start date: 1983     Quit date: 1985     Years since quittin 4   • Smokeless tobacco: Never   • Tobacco comments:      years ago   Vaping Use   • Vaping Use: Never used   Substance and Sexual Activity   • Alcohol use: Yes     Comment: Occasional   • Drug use: Never   • Sexual activity: Not Currently     Partners: Male     Birth control/protection: Post-menopausal, Female Sterilization       Current Outpatient Medications on File "Prior to Visit   Medication Sig   • atorvastatin (LIPITOR) 40 mg tablet Take 1 tablet (40 mg total) by mouth daily   • Blood Glucose Monitoring Suppl (ONE TOUCH ULTRA MINI) w/Device KIT As directed   • dapagliflozin (Farxiga) 10 MG tablet Take 1 tablet (10 mg total) by mouth daily   • glimepiride (AMARYL) 4 mg tablet 1 tab bid  • glucose blood (OneTouch Ultra) test strip Daily  • ibuprofen (MOTRIN) 800 mg tablet    • Lancets (onetouch ultrasoft) lancets daily   • lisinopril (ZESTRIL) 20 mg tablet Take 1 tablet (20 mg total) by mouth daily   • metFORMIN (GLUCOPHAGE) 1000 MG tablet Take 1 tablet (1,000 mg total) by mouth 2 (two) times a day with meals   • Multiple Vitamin (MULTIVITAMIN ADULT PO) Take 1 tablet by mouth daily   • Omega-3 Fatty Acids (Fish Oil) 1200 MG CAPS    • omeprazole (PriLOSEC) 40 MG capsule Take 1 capsule (40 mg total) by mouth daily   • Rhubarb (ESTROVEN MENOPAUSE RELIEF PO) Take 1 tablet by mouth daily   • Symbicort 160-4 5 MCG/ACT inhaler INHALE 2 PUFFS BY MOUTH 2 TIMES A DAY RINSE MOUTH AFTER USE  • TURMERIC PO Take 500 mg by mouth daily   • EPINEPHrine (EPIPEN) 0 3 mg/0 3 mL SOAJ Inject 0 3 mL (0 3 mg total) into a muscle once for 1 dose     No current facility-administered medications on file prior to visit  Allergies   Allergen Reactions   • Shellfish-Derivedproducts [Shellfish-Derived Products - Food Allergy] Anaphylaxis   • Phenytoin Hives   • Shellfish Allergy - Food Allergy Other (See Comments)       Physical Exam    /78 (BP Location: Left arm, Patient Position: Sitting, Cuff Size: Standard)   Pulse 88   Temp 98 °F (36 7 °C)   Ht 5' 3\" (1 6 m)   Wt 67 1 kg (148 lb)   BMI 26 22 kg/m²     Constitutional: normal, well developed, well nourished, alert, in no distress and non-toxic and no overt pain behavior    Eyes: anicteric  HEENT: grossly intact  Neck: supple, symmetric, trachea midline and no masses   Pulmonary:even and unlabored  Cardiovascular:No edema or pitting " edema present  Skin:Normal without rashes or lesions and well hydrated  Psychiatric:Mood and affect appropriate  Neurologic:Cranial Nerves II-XII grossly intact  Musculoskeletal:normal, Dukes going from sitting to standing sitting position no obvious skin lesion erythema lumbar sacral spine there is tenderness along the right PSIS well-healed surgical scar, decree sensation in right L5 distribution of pinwheel, negative bilateral straight leg raising, no focal motor deficit appreciated lower limbs, positive Boogie's maneuver and Yumiko maneuver on the right    Imaging  RIGHT HIP  @  5-5-23     INDICATION:   [id number]: Pain in right hip      COMPARISON:  None     VIEWS:  XR HIP/PELV 2-3 VWS RIGHT W PELVIS IF PERFORMED        FINDINGS:     There is no acute fracture or dislocation      Mild right hip osteoarthritis is seen      No lytic or blastic osseous lesion      Soft tissues are unremarkable      Postoperative changes of lower lumbar fusion are present      IMPRESSION:     No acute osseous abnormality  MRI Lumbar Spine @ [facility] 10-20-22  IMPRESSION:     1  Postoperative changes from L3 through L5 with resultant improvement in foraminal stenosis at these levels  2  Mild additional degenerative changes in the lumbar spine, similar to prior examination  MRI Lumbar Spine @ [facility] 12-7-21   IMPRESSION: Lumbar spondylosis as described above, overall slightly progressed since the prior study and most significant at L3-L4 and L4-L5, with up to severe foraminal and mild central canal narrowing    MRI Lumbar @ [facility] 6-13-19   IMPRESSION:     Multilevel thoracolumbar spondylosis, similar to 12/8/2017  TECHNIQUE: MR imaging of the lumbar spine was performed on a 1 5 T unit without contrast      FINDINGS: The last fully formed disc is designated as L5-S1 and the first conical shape vertebral segment is designated as S1       There is a levoconvex curvature centered in the mid to lower lumbar spine  Otherwise vertebral bodies maintain anatomic heights and alignment  Mixed fatty/edematous degenerative endplate changes are noted at L2-L3 and L4-L5  No suspicious bone marrow   signal alteration is identified  The conus terminates at the L1 level  The distal cord and cauda equina nerve roots are within normal limits  The paraspinal soft tissues are within normal limits  Multilevel disc desiccation is noted  There is narrowing of disc heights from L1-L2 through L4-L5  Vacuum phenomenon noted at multiple levels as well  Level by level evaluation demonstrates:     T9-T10: Evaluated on sagittal sequences only  Left paracentral protrusion type disc herniation  Right ligament flavum hypertrophy  Moderate left foraminal narrowing  No significant central canal or right foraminal narrowing  This level was not assessed   on the prior study  T10-T11: Evaluated on sagittal sequences only  Right ligamentum flavum hypertrophy  No significant central canal or foraminal narrowing  No significant change from prior  T11-T12:  Evaluated on sagittal sequences only  No disc herniation  No central canal or foraminal narrowing   No significant change from prior  T12-L1:  Evaluated on sagittal sequences only  No disc herniation  No central canal or foraminal narrowing   No significant change from prior  L1-L2:  Trace disc bulge    Mild bilateral facet arthrosis  No central canal or foraminal narrowing   No significant change from prior  L2-L3:  Moderate disc osteophyte complex asymmetric to the right  Mild bilateral facet arthrosis/ligament flavum hypertrophy  Moderate right, mild left foraminal narrowing  Narrowing of both lateral recesses  No central canal narrowing   No significant   change from prior  L3-L4:  Moderate disc osteophyte complex asymmetric to the left  Marked right, moderate left facet arthrosis with bilateral ligamentum flavum hypertrophy   Moderate right, mild left foraminal narrowing  Narrowing of both lateral recesses  Mild central   canal narrowing   No significant change from prior  L4-L5:  Disc osteophyte convex asymmetric to the left  Marked left, moderate right facet arthrosis with bilateral ligamentum flavum hypertrophy  Mild right, severe left foraminal narrowing  Narrowing of both lateral recesses  There is mass effect upon   the traversing left L5 nerve root  Mild central canal narrowing   No significant change from prior  L5-S1:  Trace disc bulge  Marked bilateral facet arthrosis  Ligamentum flavum hypertrophy  No central canal or foraminal narrowing   No significant change from prior  Mild spurring across both sacroiliac joints  I have personally reviewed pertinent films in PACS and my interpretation is Lumbar fusion with multilevel spondylosis

## 2023-06-09 NOTE — TELEPHONE ENCOUNTER
As a final attempt, a third outreach has been made via telephone call to facility  Please see Contacts section for details  This encounter will be closed and completed by end of day  Should we receive the requested information because of previous outreach attempts, the requested patient's chart will be updated appropriately       Thank you  Sangeetha Ramos MA

## 2023-06-19 ENCOUNTER — TELEPHONE (OUTPATIENT)
Dept: PAIN MEDICINE | Facility: MEDICAL CENTER | Age: 63
End: 2023-06-19

## 2023-06-19 NOTE — TELEPHONE ENCOUNTER
Caller: Don Browning PT    Doctor: Dr Que Will     Reason for call: Update     Call back#: [phone]

## 2023-06-19 NOTE — TELEPHONE ENCOUNTER
Patient is a 68y old  Male who presents with a chief complaint of Hyponatremia and CHA (28 Aug 2022 12:37)    Patient seen this morning. No new complaints. Was en route to IR for paracentesis.    MEDICATIONS  (STANDING):  albumin human 25% IVPB 100 milliLiter(s) IV Intermittent every 12 hours  cefTRIAXone   IVPB 2000 milliGRAM(s) IV Intermittent every 24 hours  chlorhexidine 2% Cloths 1 Application(s) Topical daily  gabapentin 200 milliGRAM(s) Oral two times a day  midodrine. 20 milliGRAM(s) Oral three times a day  pantoprazole    Tablet 40 milliGRAM(s) Oral before breakfast  senna 2 Tablet(s) Oral at bedtime    MEDICATIONS  (PRN):  HYDROmorphone  Injectable 1 milliGRAM(s) IV Push every 3 hours PRN Severe Pain (7 - 10)  oxyCODONE    IR 10 milliGRAM(s) Oral every 4 hours PRN Moderate Pain (4 - 6)  polyethylene glycol 3350 17 Gram(s) Oral daily PRN Constipation    Vital Signs Last 24 Hrs  T(C): 36.9 (29 Aug 2022 10:34), Max: 36.9 (29 Aug 2022 10:31)  T(F): 98.4 (29 Aug 2022 10:34), Max: 98.4 (29 Aug 2022 10:31)  HR: 98 (29 Aug 2022 10:34) (88 - 98)  BP: 100/67 (29 Aug 2022 10:34) (96/66 - 116/74)  BP(mean): --  RR: 18 (29 Aug 2022 10:34) (18 - 18)  SpO2: 98% (29 Aug 2022 10:34) (93% - 99%)    Parameters below as of 29 Aug 2022 10:31  Patient On (Oxygen Delivery Method): nasal cannula  O2 Flow (L/min): 2      PE  NAD  Awake, alert  Anicteric, MMM  Thin  Abd distended  No c/c/e  No rash grossly                          12.6   12.97 )-----------( 34       ( 29 Aug 2022 07:43 )             38.6       08-29    127<L>  |  86<L>  |  105<H>  ----------------------------<  88  5.1   |  27  |  2.37<H>    Ca    8.9      29 Aug 2022 07:46    TPro  5.7<L>  /  Alb  3.2<L>  /  TBili  0.5  /  DBili  x   /  AST  48<H>  /  ALT  35  /  AlkPhos  501<H>  08-29       R/s

## 2023-06-19 NOTE — TELEPHONE ENCOUNTER
Caller: Yuko Blackwood PT     Doctor: Dr Shelley Jackson     Reason for call: Update in regard to if she should come in for the procedure     Call back#: [phone]

## 2023-06-19 NOTE — TELEPHONE ENCOUNTER
S/w pt  Per pt, she was exposed to covid and as of yesterday, testing negative with no fevers  Pt stated that she did have diarrhea today  Pt is diabetic  Advised pt per SL, she should reschedule the procedure of today  The writer will tx her call to [address]  Pt verbalized understanding  Call was tx'd

## 2023-06-19 NOTE — TELEPHONE ENCOUNTER
Caller: patient    Doctor: Flip Bills    Reason for call: calling back, transfer to RN     Call back#:

## 2023-06-19 NOTE — TELEPHONE ENCOUNTER
Caller: Mike Siddiqui    Doctor: Dr Jeannie Muro    Reason for call: Patient states not feeling good  No fever but stomach issues and needs to speak to nurse states procedure is this afternoon      Call back#: [phone]

## 2023-06-19 NOTE — TELEPHONE ENCOUNTER
Left a detailed message on machine per medical communication consent on file advising pt to cb to discuss further  Provided cb number and office hours

## 2023-06-23 DIAGNOSIS — E78.5 HYPERLIPIDEMIA, UNSPECIFIED HYPERLIPIDEMIA TYPE: ICD-10-CM

## 2023-06-23 RX ORDER — ATORVASTATIN CALCIUM 40 MG/1
TABLET, FILM COATED ORAL
Qty: 90 TABLET | Refills: 0 | Status: SHIPPED | OUTPATIENT
Start: 2023-06-23

## 2023-06-29 ENCOUNTER — HOSPITAL ENCOUNTER (OUTPATIENT)
Dept: RADIOLOGY | Facility: CLINIC | Age: 63
Discharge: HOME | End: 2023-06-29
Payer: MEDICARE

## 2023-06-29 VITALS
HEART RATE: 97 BPM | SYSTOLIC BLOOD PRESSURE: 133 MMHG | RESPIRATION RATE: 18 BRPM | OXYGEN SATURATION: 97 % | DIASTOLIC BLOOD PRESSURE: 81 MMHG | TEMPERATURE: 97.5 F

## 2023-06-29 DIAGNOSIS — M46.1 SACROILIITIS (HCC): ICD-10-CM

## 2023-06-29 DIAGNOSIS — M25.551 HIP PAIN, RIGHT: ICD-10-CM

## 2023-06-29 PROCEDURE — 77002 NEEDLE LOCALIZATION BY XRAY: CPT | Performed by: ANESTHESIOLOGY

## 2023-06-29 PROCEDURE — 20610 DRAIN/INJ JOINT/BURSA W/O US: CPT | Performed by: ANESTHESIOLOGY

## 2023-06-29 PROCEDURE — 27096 INJECT SACROILIAC JOINT: CPT | Performed by: ANESTHESIOLOGY

## 2023-06-29 PROCEDURE — 77002 NEEDLE LOCALIZATION BY XRAY: CPT

## 2023-06-29 RX ORDER — BUPIVACAINE HCL/PF 2.5 MG/ML
4 VIAL (ML) INJECTION ONCE
Status: COMPLETED | OUTPATIENT
Start: 2023-06-29 | End: 2023-06-29

## 2023-06-29 RX ORDER — METHYLPREDNISOLONE ACETATE 80 MG/ML
80 INJECTION, SUSPENSION INTRA-ARTICULAR; INTRALESIONAL; INTRAMUSCULAR; PARENTERAL; SOFT TISSUE ONCE
Status: COMPLETED | OUTPATIENT
Start: 2023-06-29 | End: 2023-06-29

## 2023-06-29 RX ADMIN — BUPIVACAINE HYDROCHLORIDE 4 ML: 2.5 INJECTION, SOLUTION EPIDURAL; INFILTRATION; INTRACAUDAL at 10:17

## 2023-06-29 RX ADMIN — IOHEXOL 1 ML: 300 INJECTION, SOLUTION INTRAVENOUS at 10:17

## 2023-06-29 RX ADMIN — METHYLPREDNISOLONE ACETATE 80 MG: 80 INJECTION, SUSPENSION INTRA-ARTICULAR; INTRALESIONAL; INTRAMUSCULAR; SOFT TISSUE at 10:17

## 2023-06-29 NOTE — H&P
History of Present Illness: The patient is a 70 y o  female who presents with complaints of hip and low back pain  Past Medical History:   Diagnosis Date   • Anemia 2008    Due to heavy menstruation   • Arthritis    • Asthma 2005    Seasonal   • Diabetes mellitus ([facility] ) 2006   • GERD (gastroesophageal reflux disease) 2008    Could be due to gastroparesis   • Hyperlipidemia    • Hypertension 2005   • Pneumonia 2018    Walking pneumonia       Past Surgical History:   Procedure Laterality Date   • ADENOIDECTOMY  1965   • CHOLECYSTECTOMY  2006   • [address]  2018, 2019 and 2021    ACDF 2018, Laminectomy 2019 and fusion 2021   • TONSILLECTOMY  1965         Current Outpatient Medications:   •  atorvastatin (LIPITOR) 40 mg tablet, TAKE 1 TABLET DAILY, Disp: 90 tablet, Rfl: 0  •  Blood Glucose Monitoring Suppl (ONE TOUCH ULTRA MINI) w/Device KIT, As directed, Disp: 1 kit, Rfl: 0  •  dapagliflozin (Farxiga) 10 MG tablet, Take 1 tablet (10 mg total) by mouth daily, Disp: 90 tablet, Rfl: 3  •  EPINEPHrine (EPIPEN) 0 3 mg/0 3 mL SOAJ, Inject 0 3 mL (0 3 mg total) into a muscle once for 1 dose, Disp: 0 6 mL, Rfl: 0  •  glimepiride (AMARYL) 4 mg tablet, 1 tab bid , Disp: 180 tablet, Rfl: 3  •  glucose blood (OneTouch Ultra) test strip, Daily  , Disp: 100 each, Rfl: 3  •  ibuprofen (MOTRIN) 800 mg tablet, , Disp: , Rfl:   •  Lancets (onetouch ultrasoft) lancets, daily, Disp: 100 each, Rfl: 0  •  lisinopril (ZESTRIL) 20 mg tablet, Take 1 tablet (20 mg total) by mouth daily, Disp: 90 tablet, Rfl: 1  •  metFORMIN (GLUCOPHAGE) 1000 MG tablet, Take 1 tablet (1,000 mg total) by mouth 2 (two) times a day with meals, Disp: 180 tablet, Rfl: 3  •  Multiple Vitamin (MULTIVITAMIN ADULT PO), Take 1 tablet by mouth daily, Disp: , Rfl:   •  Omega-3 Fatty Acids (Fish Oil) 1200 MG CAPS, , Disp: , Rfl:   •  omeprazole (PriLOSEC) 40 MG capsule, Take 1 capsule (40 mg total) by mouth daily, Disp: 90 capsule, Rfl: 1  •  Rhubarb (ESTROVEN MENOPAUSE RELIEF PO), Take 1 tablet by mouth daily, Disp: , Rfl:   •  Symbicort 160-4 5 MCG/ACT inhaler, INHALE 2 PUFFS BY MOUTH 2 TIMES A DAY RINSE MOUTH AFTER USE , Disp: 30 6 g, Rfl: 2  •  TURMERIC PO, Take 500 mg by mouth daily, Disp: , Rfl:     Current Facility-Administered Medications:   •  bupivacaine (PF) (MARCAINE) 0 25 % injection 4 mL, 4 mL, Intra-articular, Once, Simeon Hayes DO  •  iohexol (OMNIPAQUE) 300 mg/mL injection 1 mL, 1 mL, Intra-articular, Once, Simeon Hayes DO  •  methylPREDNISolone acetate (DEPO-MEDROL) injection 80 mg, 80 mg, Intra-articular, Once, Mony Chamorro DO    Allergies   Allergen Reactions   • Shellfish-Derivedproducts [Shellfish-Derived Products - Food Allergy] Anaphylaxis   • Phenytoin Hives   • Shellfish Allergy - Food Allergy Other (See Comments)       Physical Exam:   General: Awake, Alert, Oriented x 3, Mood and affect appropriate  Respiratory: Respirations even and unlabored  Cardiovascular: Peripheral pulses intact; no edema  Musculoskeletal Exam: Normal gait    ASA Score: II    Patient/Chart Verification  Patient ID Verified: Verbal  ID Band Applied: No  Consents Confirmed: Procedural, To be obtained in the Pre-Procedure area  Interval H&P(within 24 hr) Complete (required for Outpatients and Surgery Admit only): To be obtained in the Pre-Procedure area  Allergies Reviewed: Yes  Anticoag/NSAID held?: NA  Currently on antibiotics?: No  Pregnancy denied?: NA    Assessment:   1  Sacroiliitis ([facility] )    2   Hip pain, right        Plan: rigth GT bursa and right SI joint injection

## 2023-06-29 NOTE — DISCHARGE INSTRUCTIONS
Steroid Joint Injection   WHAT YOU NEED TO KNOW:   A steroid joint injection is a procedure to inject steroid medicine into a joint  Steroid medicine decreases pain and inflammation  The injection may also contain an anesthetic (numbing medicine) to decrease pain  It may be done to treat conditions such as arthritis, gout, or carpal tunnel syndrome  The injections may be given in your knee, ankle, shoulder, elbow, wrist, ankle or sacroiliac joint  Do not apply heat to any area that is numb  If you have discomfort or soreness at the injection site, you may apply ice today, 20 minutes on and 20 minutes off  Tomorrow you may use ice or warm, moist heat  Do not apply ice or heat directly to the skin  You may have an increase or change in the discomfort for 36-48 hours after your treatment  Apply ice and continue with any pain medicine you have been prescribed  Do not do anything strenuous today  You may shower, but no tub baths or hot tubs today  You may resume your normal activities tomorrow, but do not “overdo it”  Resume normal activities slowly when you are feeling better  If you experience redness, drainage or swelling at the injection site, or if you develop a fever above 100 degrees, please call [facility] at [phone] or go to the Emergency Room  Continue to take all routine medicines prescribed by your primary care physician unless otherwise instructed by our staff  Most blood thinners should be started again according to your regularly scheduled dosing  If you have any questions, please give our office a call  As no general anesthesia was used in today's procedure, you should not experience any side effects related to anesthesia  If you are diabetic, the steroids used in today's injection may temporarily increase your blood sugar levels after the first few days after your injection   Please keep a close eye on your sugars and alert the doctor who manages your diabetes if your sugars are significantly high from your baseline or you are symptomatic  If you have a problem specifically related to your procedure, please call our office at [phone]  Problems not related to your procedure should be directed to your primary care physician

## 2023-06-30 DIAGNOSIS — E11.42 TYPE 2 DIABETES MELLITUS WITH DIABETIC POLYNEUROPATHY, WITHOUT LONG-TERM CURRENT USE OF INSULIN (HCC): Primary | ICD-10-CM

## 2023-06-30 NOTE — TELEPHONE ENCOUNTER
Patient called cory for refill of Metformin, 90 day supply to [facility]  She states they cancelled the last script sent

## 2023-07-05 ENCOUNTER — HOSPITAL ENCOUNTER (EMERGENCY)
Facility: HOSPITAL | Age: 63
Discharge: HOME | End: 2023-07-05
Attending: EMERGENCY MEDICINE
Payer: MEDICARE

## 2023-07-05 ENCOUNTER — APPOINTMENT (EMERGENCY)
Dept: CT IMAGING | Facility: HOSPITAL | Age: 63
End: 2023-07-05
Payer: MEDICARE

## 2023-07-05 ENCOUNTER — OFFICE VISIT (OUTPATIENT)
Dept: URGENT CARE | Facility: CLINIC | Age: 63
End: 2023-07-05
Payer: MEDICARE

## 2023-07-05 VITALS
SYSTOLIC BLOOD PRESSURE: 135 MMHG | HEART RATE: 78 BPM | HEIGHT: 63 IN | DIASTOLIC BLOOD PRESSURE: 79 MMHG | RESPIRATION RATE: 18 BRPM | BODY MASS INDEX: 25.87 KG/M2 | OXYGEN SATURATION: 98 % | WEIGHT: 146 LBS | TEMPERATURE: 98.5 F

## 2023-07-05 VITALS
SYSTOLIC BLOOD PRESSURE: 124 MMHG | OXYGEN SATURATION: 98 % | WEIGHT: 148.4 LBS | TEMPERATURE: 98.5 F | RESPIRATION RATE: 18 BRPM | HEART RATE: 99 BPM | DIASTOLIC BLOOD PRESSURE: 70 MMHG | BODY MASS INDEX: 26.29 KG/M2

## 2023-07-05 DIAGNOSIS — R51.9 ACUTE INTRACTABLE HEADACHE, UNSPECIFIED HEADACHE TYPE: ICD-10-CM

## 2023-07-05 DIAGNOSIS — H92.02 OTALGIA OF LEFT EAR: Primary | ICD-10-CM

## 2023-07-05 DIAGNOSIS — H43.391 VITREOUS FLOATERS OF RIGHT EYE: Primary | ICD-10-CM

## 2023-07-05 LAB
ALBUMIN SERPL BCP-MCNC: 4.6 G/DL (ref 3.5–5)
ALP SERPL-CCNC: 91 U/L (ref 34–104)
ALT SERPL W P-5'-P-CCNC: 28 U/L (ref 7–52)
ANION GAP SERPL CALCULATED.3IONS-SCNC: 8 MMOL/L
AST SERPL W P-5'-P-CCNC: 19 U/L (ref 13–39)
BASOPHILS # BLD AUTO: 0.08 THOUSANDS/ÂΜL (ref 0–0.1)
BASOPHILS NFR BLD AUTO: 1 % (ref 0–1)
BILIRUB SERPL-MCNC: 0.29 MG/DL (ref 0.2–1)
BUN SERPL-MCNC: 27 MG/DL (ref 5–25)
CALCIUM SERPL-MCNC: 9.8 MG/DL (ref 8.4–10.2)
CHLORIDE SERPL-SCNC: 105 MMOL/L (ref 96–108)
CO2 SERPL-SCNC: 23 MMOL/L (ref 21–32)
CREAT SERPL-MCNC: 0.67 MG/DL (ref 0.6–1.3)
CRP SERPL QL: 1.3 MG/L
EOSINOPHIL # BLD AUTO: 0.71 THOUSAND/ÂΜL (ref 0–0.61)
EOSINOPHIL NFR BLD AUTO: 7 % (ref 0–6)
ERYTHROCYTE [DISTWIDTH] IN BLOOD BY AUTOMATED COUNT: 12.8 % (ref 11.6–15.1)
ERYTHROCYTE [SEDIMENTATION RATE] IN BLOOD: 11 MM/HOUR (ref 0–29)
GFR SERPL CREATININE-BSD FRML MDRD: 92 ML/MIN/1.73SQ M
GLUCOSE SERPL-MCNC: 97 MG/DL (ref 65–140)
HCT VFR BLD AUTO: 41.7 % (ref 34.8–46.1)
HGB BLD-MCNC: 13.5 G/DL (ref 11.5–15.4)
IMM GRANULOCYTES # BLD AUTO: 0.04 THOUSAND/UL (ref 0–0.2)
IMM GRANULOCYTES NFR BLD AUTO: 0 % (ref 0–2)
LYMPHOCYTES # BLD AUTO: 2.66 THOUSANDS/ÂΜL (ref 0.6–4.47)
LYMPHOCYTES NFR BLD AUTO: 26 % (ref 14–44)
MCH RBC QN AUTO: 30 PG (ref 26.8–34.3)
MCHC RBC AUTO-ENTMCNC: 32.4 G/DL (ref 31.4–37.4)
MCV RBC AUTO: 93 FL (ref 82–98)
MONOCYTES # BLD AUTO: 0.53 THOUSAND/ÂΜL (ref 0.17–1.22)
MONOCYTES NFR BLD AUTO: 5 % (ref 4–12)
NEUTROPHILS # BLD AUTO: 6.16 THOUSANDS/ÂΜL (ref 1.85–7.62)
NEUTS SEG NFR BLD AUTO: 61 % (ref 43–75)
NRBC BLD AUTO-RTO: 0 /100 WBCS
PLATELET # BLD AUTO: 270 THOUSANDS/UL (ref 149–390)
PMV BLD AUTO: 10.1 FL (ref 8.9–12.7)
POTASSIUM SERPL-SCNC: 4.3 MMOL/L (ref 3.5–5.3)
PROT SERPL-MCNC: 7.4 G/DL (ref 6.4–8.4)
RBC # BLD AUTO: 4.5 MILLION/UL (ref 3.81–5.12)
SODIUM SERPL-SCNC: 136 MMOL/L (ref 135–147)
WBC # BLD AUTO: 10.18 THOUSAND/UL (ref 4.31–10.16)

## 2023-07-05 PROCEDURE — G1004 CDSM NDSC: HCPCS

## 2023-07-05 PROCEDURE — 80053 COMPREHEN METABOLIC PANEL: CPT | Performed by: PHYSICIAN ASSISTANT

## 2023-07-05 PROCEDURE — G0463 HOSPITAL OUTPT CLINIC VISIT: HCPCS | Performed by: PHYSICIAN ASSISTANT

## 2023-07-05 PROCEDURE — 85025 COMPLETE CBC W/AUTO DIFF WBC: CPT | Performed by: PHYSICIAN ASSISTANT

## 2023-07-05 PROCEDURE — 99284 EMERGENCY DEPT VISIT MOD MDM: CPT | Performed by: EMERGENCY MEDICINE

## 2023-07-05 PROCEDURE — 99284 EMERGENCY DEPT VISIT MOD MDM: CPT

## 2023-07-05 PROCEDURE — 85652 RBC SED RATE AUTOMATED: CPT | Performed by: PHYSICIAN ASSISTANT

## 2023-07-05 PROCEDURE — 93005 ELECTROCARDIOGRAM TRACING: CPT

## 2023-07-05 PROCEDURE — 93005 ELECTROCARDIOGRAM TRACING: CPT | Performed by: PHYSICIAN ASSISTANT

## 2023-07-05 PROCEDURE — 86140 C-REACTIVE PROTEIN: CPT | Performed by: PHYSICIAN ASSISTANT

## 2023-07-05 PROCEDURE — 99213 OFFICE O/P EST LOW 20 MIN: CPT | Performed by: PHYSICIAN ASSISTANT

## 2023-07-05 PROCEDURE — 70480 CT ORBIT/EAR/FOSSA W/O DYE: CPT

## 2023-07-05 PROCEDURE — 36415 COLL VENOUS BLD VENIPUNCTURE: CPT | Performed by: PHYSICIAN ASSISTANT

## 2023-07-05 NOTE — PROGRESS NOTES
[facility]        NAME: Ximena Rust is a 70 y.o. female  : 1960    MRN: [id number]  DATE: 2023  TIME: 12:16 PM    Assessment and Plan   Vitreous floaters of right eye [H43.391]  1. Vitreous floaters of right eye  ECG 12 lead    Transfer to other facility      2. Acute intractable headache, unspecified headache type  ECG 12 lead    Transfer to other facility      EKG- normal sinus rhythm with possible left atrial enlargement and septal infarct      AMA was signed. Patient called her  to  her to ED    Patient Instructions       Follow up with PCP in 3-5 days. Proceed to  ER if symptoms worsen. Chief Complaint     Chief Complaint   Patient presents with   • Earache     Patient c/o left ear pain that started 2 days ago, yesterday had stabbing pain in ear and today woke up with whole side of head hurting. Has taken tylenol and has been using a heating pad to help with no relief. History of Present Illness       Patient is here today reporting that 2-3 weeks ago she had a stiff neck for a few days. Patient now reports floaters in right eye. Denies any contact use but does wear glasses. Patient also reports left side face pain and headache. Allergies were reviewed in chart. Admtis history of TIA      Review of Systems   Review of Systems   Eyes: Positive for visual disturbance. Respiratory: Negative. Cardiovascular: Negative. Musculoskeletal: Positive for neck pain. Neurological: Positive for headaches. Psychiatric/Behavioral: Negative.           Current Medications       Current Outpatient Medications:   •  atorvastatin (LIPITOR) 40 mg tablet, TAKE 1 TABLET DAILY, Disp: 90 tablet, Rfl: 0  •  Blood Glucose Monitoring Suppl (ONE TOUCH ULTRA MINI) w/Device KIT, As directed, Disp: 1 kit, Rfl: 0  •  dapagliflozin (Farxiga) 10 MG tablet, Take 1 tablet (10 mg total) by mouth daily, Disp: 90 tablet, Rfl: 3  •  EPINEPHrine (EPIPEN) 0.3 mg/0.3 mL SOAJ, Inject 0.3 mL (0.3 mg total) into a muscle once for 1 dose, Disp: 0.6 mL, Rfl: 0  •  glimepiride (AMARYL) 4 mg tablet, 1 tab bid., Disp: 180 tablet, Rfl: 3  •  glucose blood (OneTouch Ultra) test strip, Daily. , Disp: 100 each, Rfl: 3  •  ibuprofen (MOTRIN) 800 mg tablet, , Disp: , Rfl:   •  Lancets (onetouch ultrasoft) lancets, daily, Disp: 100 each, Rfl: 0  •  lisinopril (ZESTRIL) 20 mg tablet, Take 1 tablet (20 mg total) by mouth daily, Disp: 90 tablet, Rfl: 1  •  metFORMIN (GLUCOPHAGE) 1000 MG tablet, Take 1 tablet (1,000 mg total) by mouth 2 (two) times a day with meals, Disp: 180 tablet, Rfl: 3  •  Multiple Vitamin (MULTIVITAMIN ADULT PO), Take 1 tablet by mouth daily, Disp: , Rfl:   •  Omega-3 Fatty Acids (Fish Oil) 1200 MG CAPS, , Disp: , Rfl:   •  omeprazole (PriLOSEC) 40 MG capsule, Take 1 capsule (40 mg total) by mouth daily, Disp: 90 capsule, Rfl: 1  •  Rhubarb (ESTROVEN MENOPAUSE RELIEF PO), Take 1 tablet by mouth daily, Disp: , Rfl:   •  Symbicort 160-4.5 MCG/ACT inhaler, INHALE 2 PUFFS BY MOUTH 2 TIMES A DAY RINSE MOUTH AFTER USE., Disp: 30.6 g, Rfl: 2  •  TURMERIC PO, Take 500 mg by mouth daily, Disp: , Rfl:     Current Allergies     Allergies as of 07/05/2023 - Reviewed 07/05/2023   Allergen Reaction Noted   • Shellfish-derivedproducts [shellfish-derived products - food allergy] Anaphylaxis 10/18/2018   • Phenytoin Hives 05/29/1985   • Shellfish allergy - food allergy Other (See Comments) 09/23/2022            The following portions of the patient's history were reviewed and updated as appropriate: allergies, current medications, past family history, past medical history, past social history, past surgical history and problem list.     Past Medical History:   Diagnosis Date   • Anemia 2008    Due to heavy menstruation   • Arthritis    • Asthma 2005    Seasonal   • Diabetes mellitus ([address]) 2006   • GERD (gastroesophageal reflux disease) 2008    Could be due to gastroparesis   • Hyperlipidemia    • Hypertension 2005 • Pneumonia 2018    Walking pneumonia       Past Surgical History:   Procedure Laterality Date   • ADENOIDECTOMY  1965   • CHOLECYSTECTOMY  2006   • [address]  2018, 2019 and     ACDF 2018, Laminectomy 2019 and fusion    • TONSILLECTOMY  1965       Family History   Problem Relation Age of Onset   • Cancer Mother         Unknown   • Clotting disorder Mother         Factor XI deficiency   • Coronary artery disease Father          at 46 years   • Diabetes Father    • Hypertension Father    • Diabetes type II Father    • Breast cancer Sister         52's   • Cancer Sister         Breast cancer twice   • COPD Sister    • No Known Problems Daughter    • No Known Problems Daughter    • No Known Problems Daughter    • No Known Problems Daughter    • No Known Problems Maternal Grandmother    • No Known Problems Maternal Grandfather    • No Known Problems Paternal Grandmother    • No Known Problems Paternal Grandfather    • No Known Problems Maternal Aunt    • Breast cancer Paternal Aunt         63's   • Cancer Paternal Aunt         Breast cancer   • Diabetes Paternal Aunt    • Hypertension Paternal Aunt    • Coronary artery disease Brother         Congestive Heart Failure   • Heart failure Brother    • Hypertension Brother    • Heart failure Paternal Uncle    • Hypertension Paternal Uncle    • Heart failure Paternal Uncle    • Hypertension Paternal Uncle    • Diabetes type II Family         Daughter   • Hypothyroidism Family         Daughter         Medications have been verified. Objective   /70   Pulse 99   Temp 98.5 °F (36.9 °C) (Tympanic)   Resp 18   Wt 67.3 kg (148 lb 6.4 oz)   SpO2 98%   BMI 26.29 kg/m²   No LMP recorded. Patient is postmenopausal.       Physical Exam     Physical Exam  Vitals and nursing note reviewed. Constitutional:       Appearance: Normal appearance. HENT:      Head: Normocephalic.       Comments: Symmetric smile and eye brow raise     Right Ear: Tympanic membrane, ear canal and external ear normal.      Left Ear: Tympanic membrane, ear canal and external ear normal.      Mouth/Throat:      Mouth: Mucous membranes are moist.   Eyes:      Extraocular Movements: Extraocular movements intact. Pupils: Pupils are equal, round, and reactive to light. Cardiovascular:      Rate and Rhythm: Normal rate and regular rhythm. Heart sounds: Normal heart sounds. Pulmonary:      Breath sounds: Normal breath sounds. Musculoskeletal:      Comments: Upper and lower body motor intact. Hand  strength intact   Neurological:      General: No focal deficit present. Mental Status: She is alert and oriented to person, place, and time.    Psychiatric:         Mood and Affect: Mood normal.         Behavior: Behavior normal.

## 2023-07-05 NOTE — DISCHARGE INSTRUCTIONS
Take tylenol/motrin for discomfort. Follow up with PCP or neurology for recheck. Return to ER if symptoms worsen.

## 2023-07-05 NOTE — ED PROVIDER NOTES
History  Chief Complaint   Patient presents with   • Earache     Pt went to  today was told to come here, pt has a hx of TIA. For about a week, pt is complaining of sharp pains in her left side neck/head/ear and some dizziness(sx are not like the TIA she had). Pt denies any chest pain. No neur deficits     Patient is a 71 y/o F with h/o DM, HTN, Hyperlipidemia that presents to the ED with sharp pain in left ear that comes and goes for 1 week. She states the pain shoots into her head and down her neck. The pain only lasts a couple seconds. Nothing brings on the pain, nothing makes it better. She denies fevers, chills, runny nose or sore throat. No neck pain or stiffness. No vision changes, numbness, or weakness. History provided by:  Patient  Earache  Location:  Left  Behind ear:  Redness  Quality:  Sharp and shooting  Severity:  Moderate  Onset quality:  Gradual  Duration:  1 week  Timing:  Intermittent  Progression:  Unchanged  Chronicity:  New  Context: not recent URI and not water in ear    Relieved by:  Nothing  Worsened by:  Nothing  Ineffective treatments:  None tried  Associated symptoms: no abdominal pain, no congestion, no cough, no diarrhea, no ear discharge, no fever, no headaches, no hearing loss, no neck pain, no rash, no rhinorrhea, no sore throat, no tinnitus and no vomiting        Prior to Admission Medications   Prescriptions Last Dose Informant Patient Reported? Taking?    Blood Glucose Monitoring Suppl (ONE TOUCH ULTRA MINI) w/Device KIT  Self No No   Sig: As directed   EPINEPHrine (EPIPEN) 0.3 mg/0.3 mL SOAJ  Self No No   Sig: Inject 0.3 mL (0.3 mg total) into a muscle once for 1 dose   Lancets (onetouch ultrasoft) lancets  Self No No   Sig: daily   Multiple Vitamin (MULTIVITAMIN ADULT PO)  Self Yes No   Sig: Take 1 tablet by mouth daily   Omega-3 Fatty Acids (Fish Oil) 1200 MG CAPS  Self Yes No   Rhubarb (ESTROVEN MENOPAUSE RELIEF PO)  Self Yes No   Sig: Take 1 tablet by mouth daily Symbicort 160-4.5 MCG/ACT inhaler  Self No No   Sig: INHALE 2 PUFFS BY MOUTH 2 TIMES A DAY RINSE MOUTH AFTER USE. TURMERIC PO  Self Yes No   Sig: Take 500 mg by mouth daily   atorvastatin (LIPITOR) 40 mg tablet   No No   Sig: TAKE 1 TABLET DAILY   dapagliflozin (Farxiga) 10 MG tablet  Self No No   Sig: Take 1 tablet (10 mg total) by mouth daily   glimepiride (AMARYL) 4 mg tablet  Self No No   Si tab bid. glucose blood (OneTouch Ultra) test strip  Self No No   Sig: Daily.    ibuprofen (MOTRIN) 800 mg tablet  Self Yes No   lisinopril (ZESTRIL) 20 mg tablet  Self No No   Sig: Take 1 tablet (20 mg total) by mouth daily   metFORMIN (GLUCOPHAGE) 1000 MG tablet   No No   Sig: Take 1 tablet (1,000 mg total) by mouth 2 (two) times a day with meals   omeprazole (PriLOSEC) 40 MG capsule  Self No No   Sig: Take 1 capsule (40 mg total) by mouth daily      Facility-Administered Medications: None       Past Medical History:   Diagnosis Date   • Anemia     Due to heavy menstruation   • Arthritis    • Asthma     Seasonal   • Diabetes mellitus ([facility])    • GERD (gastroesophageal reflux disease)     Could be due to gastroparesis   • Hyperlipidemia    • Hypertension 2005   • Pneumonia 2018    Walking pneumonia       Past Surgical History:   Procedure Laterality Date   • ADENOIDECTOMY  1965   • CHOLECYSTECTOMY     • SPINE SURGERY  2018, 2019 and     ACDF 2018, Laminectomy  and fusion    • TONSILLECTOMY         Family History   Problem Relation Age of Onset   • Cancer Mother         Unknown   • Clotting disorder Mother         Factor XI deficiency   • Coronary artery disease Father          at 46 years   • Diabetes Father    • Hypertension Father    • Diabetes type II Father    • Breast cancer Sister         52's   • Cancer Sister         Breast cancer twice   • COPD Sister    • No Known Problems Daughter    • No Known Problems Daughter    • No Known Problems Daughter    • No Known Problems Daughter    • No Known Problems Maternal Grandmother    • No Known Problems Maternal Grandfather    • No Known Problems Paternal Grandmother    • No Known Problems Paternal Grandfather    • No Known Problems Maternal Aunt    • Breast cancer Paternal Aunt         62's   • Cancer Paternal Aunt         Breast cancer   • Diabetes Paternal Aunt    • Hypertension Paternal Aunt    • Coronary artery disease Brother         Congestive Heart Failure   • Heart failure Brother    • Hypertension Brother    • Heart failure Paternal Uncle    • Hypertension Paternal Uncle    • Heart failure Paternal Uncle    • Hypertension Paternal Uncle    • Diabetes type II Family         Daughter   • Hypothyroidism Family         Daughter     I have reviewed and agree with the history as documented. E-Cigarette/Vaping   • E-Cigarette Use Never User      E-Cigarette/Vaping Substances   • Nicotine No    • THC No    • CBD No    • Flavoring No    • Other No    • Unknown No      Social History     Tobacco Use   • Smoking status: Former     Packs/day: 0.50     Years: 2.00     Total pack years: 1.00     Types: Cigarettes     Start date: 1983     Quit date: 1985     Years since quittin.5   • Smokeless tobacco: Never   • Tobacco comments:      years ago   Vaping Use   • Vaping Use: Never used   Substance Use Topics   • Alcohol use: Yes     Comment: Occasional   • Drug use: Never       Review of Systems   Constitutional: Negative for chills and fever. HENT: Positive for ear pain. Negative for congestion, ear discharge, hearing loss, rhinorrhea, sore throat and tinnitus. Eyes: Negative for visual disturbance. Respiratory: Negative for cough and shortness of breath. Cardiovascular: Negative for chest pain, palpitations and leg swelling. Gastrointestinal: Negative for abdominal pain, diarrhea, nausea and vomiting. Genitourinary: Negative for dysuria. Musculoskeletal: Negative for neck pain.    Skin: Negative for color change and rash. Neurological: Negative for dizziness, syncope, facial asymmetry, speech difficulty, weakness, light-headedness, numbness and headaches. Psychiatric/Behavioral: Negative for confusion. All other systems reviewed and are negative. Physical Exam  Physical Exam  Vitals and nursing note reviewed. Constitutional:       General: She is not in acute distress. Appearance: Normal appearance. She is well-developed, well-groomed and normal weight. She is not ill-appearing or diaphoretic. HENT:      Head: Normocephalic and atraumatic. Comments: No tenderness to left temple. Right Ear: Hearing, tympanic membrane and ear canal normal. No decreased hearing noted. Left Ear: Hearing, tympanic membrane and ear canal normal. No decreased hearing noted. No swelling or tenderness. Ears:      Comments: Mild erythema behind left ear. Nose: Nose normal.      Mouth/Throat:      Mouth: Mucous membranes are moist.      Pharynx: Oropharynx is clear. Eyes:      Conjunctiva/sclera: Conjunctivae normal.      Pupils: Pupils are equal, round, and reactive to light. Cardiovascular:      Rate and Rhythm: Normal rate and regular rhythm. Heart sounds: Normal heart sounds. Pulmonary:      Effort: Pulmonary effort is normal.      Breath sounds: Normal breath sounds. No wheezing, rhonchi or rales. Musculoskeletal:         General: Normal range of motion. Cervical back: Normal range of motion and neck supple. Lymphadenopathy:      Cervical: Cervical adenopathy present. Left cervical: Superficial cervical adenopathy present. Skin:     General: Skin is warm and dry. Coloration: Skin is not jaundiced or pale. Findings: No rash. Neurological:      General: No focal deficit present. Mental Status: She is alert and oriented to person, place, and time. Cranial Nerves: No cranial nerve deficit. Sensory: No sensory deficit. Motor: No weakness. Psychiatric:         Mood and Affect: Mood normal.         Behavior: Behavior is cooperative.          Vital Signs  ED Triage Vitals [07/05/23 1316]   Temperature Pulse Respirations Blood Pressure SpO2   98.5 °F (36.9 °C) (!) 110 18 163/90 98 %      Temp Source Heart Rate Source Patient Position - Orthostatic VS BP Location FiO2 (%)   Temporal Monitor Sitting Right arm --      Pain Score       7           Vitals:    07/05/23 1316 07/05/23 1615   BP: 163/90 135/79   Pulse: (!) 110 78   Patient Position - Orthostatic VS: Sitting Lying         Visual Acuity  Visual Acuity    Flowsheet Row Most Recent Value   L Pupil Size (mm) 3   R Pupil Size (mm) 3          ED Medications  Medications - No data to display    Diagnostic Studies  Results Reviewed     Procedure Component Value Units Date/Time    Sedimentation rate, automated [[id number]]  (Normal) Collected: 07/05/23 1615    Lab Status: Final result Specimen: Blood from Arm, Left Updated: 07/05/23 1706     Sed Rate 11 mm/hour     Comprehensive metabolic panel [[id number]]  (Abnormal) Collected: 07/05/23 1615    Lab Status: Final result Specimen: Blood from Arm, Left Updated: 07/05/23 1641     Sodium 136 mmol/L      Potassium 4.3 mmol/L      Chloride 105 mmol/L      CO2 23 mmol/L      ANION GAP 8 mmol/L      BUN 27 mg/dL      Creatinine 0.67 mg/dL      Glucose 97 mg/dL      Calcium 9.8 mg/dL      AST 19 U/L      ALT 28 U/L      Alkaline Phosphatase 91 U/L      Total Protein 7.4 g/dL      Albumin 4.6 g/dL      Total Bilirubin 0.29 mg/dL      eGFR 92 ml/min/1.73sq m     Narrative:      [place] guidelines for Chronic Kidney Disease (CKD):   •  Stage 1 with normal or high GFR (GFR > 90 mL/min/1.73 square meters)  •  Stage 2 Mild CKD (GFR = 60-89 mL/min/1.73 square meters)  •  Stage 3A Moderate CKD (GFR = 45-59 mL/min/1.73 square meters)  •  Stage 3B Moderate CKD (GFR = 30-44 mL/min/1.73 square meters)  •  Stage 4 Severe CKD (GFR = 15-29 mL/min/1.73 square meters)  •  Stage 5 End Stage CKD (GFR <15 mL/min/1.73 square meters)  Note: GFR calculation is accurate only with a steady state creatinine    C-reactive protein [[id number]]  (Normal) Collected: 07/05/23 1615    Lab Status: Final result Specimen: Blood from Arm, Left Updated: 07/05/23 1641     CRP 1.3 mg/L     CBC and differential [[id number]]  (Abnormal) Collected: 07/05/23 1615    Lab Status: Final result Specimen: Blood from Arm, Left Updated: 07/05/23 1625     WBC 10.18 Thousand/uL      RBC 4.50 Million/uL      Hemoglobin 13.5 g/dL      Hematocrit 41.7 %      MCV 93 fL      MCH 30.0 pg      MCHC 32.4 g/dL      RDW 12.8 %      MPV 10.1 fL      Platelets 836 Thousands/uL      nRBC 0 /100 WBCs      Neutrophils Relative 61 %      Immat GRANS % 0 %      Lymphocytes Relative 26 %      Monocytes Relative 5 %      Eosinophils Relative 7 %      Basophils Relative 1 %      Neutrophils Absolute 6.16 Thousands/µL      Immature Grans Absolute 0.04 Thousand/uL      Lymphocytes Absolute 2.66 Thousands/µL      Monocytes Absolute 0.53 Thousand/µL      Eosinophils Absolute 0.71 Thousand/µL      Basophils Absolute 0.08 Thousands/µL                  CT orbits/temporal bones/skull base wo contrast   Final Result by Anali Harry MD (07/05 1620)      Normal-appearing temporal bones bilaterally. Workstation performed: [id number]                    Procedures  Procedures         ED Course                               SBIRT 22yo+    Flowsheet Row Most Recent Value   Initial Alcohol Screen: US AUDIT-C     1. How often do you have a drink containing alcohol? 0 Filed at: 07/05/2023 1318   2. How many drinks containing alcohol do you have on a typical day you are drinking? 0 Filed at: 07/05/2023 1318   3a. Male UNDER 65: How often do you have five or more drinks on one occasion? 0 Filed at: 07/05/2023 1318   3b. FEMALE Any Age, or MALE 65+: How often do you have 4 or more drinks on one occassion?  0 Filed at: 07/05/2023 1318   Audit-C Score 0 Filed at: 07/05/2023 1318   KAL: How many times in the past year have you. .. Used an illegal drug or used a prescription medication for non-medical reasons? Never Filed at: 07/05/2023 1318                    Medical Decision Making  Patient with stabbing pain to left ear off and on for 1 week, no signs of ear infection, so rash - no concern for shingles. Mild redness behind left ear, will order CT scan to r/o mastoiditis. No concern for temporal arteritis, sed rate normal, non tender to left temple. Discussed with patient this could be trigeminal neuralgia and to f/u with PCP or neuro for further evaluation. Otalgia of left ear: acute illness or injury  Amount and/or Complexity of Data Reviewed  Labs: ordered. Radiology: ordered. Disposition  Final diagnoses:   Otalgia of left ear     Time reflects when diagnosis was documented in both MDM as applicable and the Disposition within this note     Time User Action Codes Description Comment    7/5/2023  5:18 PM Antonio Sorenson Add [H92.02] Otalgia of left ear       ED Disposition     ED Disposition   Discharge    Condition   Stable    Date/Time   Wed Jul 5, 2023  5:17 PM    Comment   Enriqueta Rinne discharge to home/self care.                Follow-up Information     Follow up With Specialties Details Why Contact Info Additional Information    Neurology [facility] Neurology Schedule an appointment as soon as possible for a visit  For recheck [address] [facility] [id number] [phone] Neurology [facility], [address] [place] [address], [address] [facility]          Discharge Medication List as of 7/5/2023  5:18 PM      CONTINUE these medications which have NOT CHANGED    Details   atorvastatin (LIPITOR) 40 mg tablet TAKE 1 TABLET DAILY, Normal      Blood Glucose Monitoring Suppl (ONE TOUCH ULTRA MINI) w/Device KIT As directed, Normal      dapagliflozin (Farxiga) 10 MG tablet Take 1 tablet (10 mg total) by mouth daily, Starting Wed 5/24/2023, Normal      EPINEPHrine (EPIPEN) 0.3 mg/0.3 mL SOAJ Inject 0.3 mL (0.3 mg total) into a muscle once for 1 dose, Starting Tue 4/18/2023, Normal      glimepiride (AMARYL) 4 mg tablet 1 tab bid., Normal      glucose blood (OneTouch Ultra) test strip Daily., Normal      ibuprofen (MOTRIN) 800 mg tablet Starting Fri 3/10/2023, Historical Med      Lancets (onetouch ultrasoft) lancets daily, Normal      lisinopril (ZESTRIL) 20 mg tablet Take 1 tablet (20 mg total) by mouth daily, Starting Wed 5/31/2023, Normal      metFORMIN (GLUCOPHAGE) 1000 MG tablet Take 1 tablet (1,000 mg total) by mouth 2 (two) times a day with meals, Starting Fri 6/30/2023, Normal      Multiple Vitamin (MULTIVITAMIN ADULT PO) Take 1 tablet by mouth daily, Historical Med      Omega-3 Fatty Acids (Fish Oil) 1200 MG CAPS Starting Mon 8/1/2022, Historical Med      omeprazole (PriLOSEC) 40 MG capsule Take 1 capsule (40 mg total) by mouth daily, Starting Wed 5/31/2023, Normal      Rhubarb (ESTROVEN MENOPAUSE RELIEF PO) Take 1 tablet by mouth daily, Historical Med      Symbicort 160-4.5 MCG/ACT inhaler INHALE 2 PUFFS BY MOUTH 2 TIMES A DAY RINSE MOUTH AFTER USE., Normal      TURMERIC PO Take 500 mg by mouth daily, Historical Med             No discharge procedures on file.     PDMP Review     None          ED Provider  Electronically Signed by           Leeanne Lee PA-C  07/05/23 2004

## 2023-07-06 ENCOUNTER — TELEPHONE (OUTPATIENT)
Dept: PAIN MEDICINE | Facility: CLINIC | Age: 63
End: 2023-07-06

## 2023-07-06 DIAGNOSIS — E11.42 TYPE 2 DIABETES MELLITUS WITH DIABETIC POLYNEUROPATHY, WITHOUT LONG-TERM CURRENT USE OF INSULIN (HCC): Primary | ICD-10-CM

## 2023-07-06 LAB
ATRIAL RATE: 92 BPM
ATRIAL RATE: 99 BPM
P AXIS: 55 DEGREES
P AXIS: 59 DEGREES
PR INTERVAL: 160 MS
PR INTERVAL: 164 MS
QRS AXIS: 25 DEGREES
QRS AXIS: 48 DEGREES
QRSD INTERVAL: 78 MS
QRSD INTERVAL: 82 MS
QT INTERVAL: 336 MS
QT INTERVAL: 344 MS
QTC INTERVAL: 425 MS
QTC INTERVAL: 431 MS
T WAVE AXIS: 34 DEGREES
T WAVE AXIS: 43 DEGREES
VENTRICULAR RATE: 92 BPM
VENTRICULAR RATE: 99 BPM

## 2023-07-06 PROCEDURE — 93010 ELECTROCARDIOGRAM REPORT: CPT | Performed by: INTERNAL MEDICINE

## 2023-07-06 RX ORDER — BLOOD SUGAR DIAGNOSTIC
STRIP MISCELLANEOUS
Qty: 100 EACH | Refills: 3 | Status: SHIPPED | OUTPATIENT
Start: 2023-07-06

## 2023-07-06 RX ORDER — PEN NEEDLE, DIABETIC 31 GX5/16"
NEEDLE, DISPOSABLE MISCELLANEOUS DAILY
Qty: 100 EACH | Refills: 3 | Status: SHIPPED | OUTPATIENT
Start: 2023-07-06

## 2023-07-06 RX ORDER — LANCETS
EACH MISCELLANEOUS
Qty: 100 EACH | Refills: 3 | Status: SHIPPED | OUTPATIENT
Start: 2023-07-06

## 2023-07-06 RX ORDER — BLOOD-GLUCOSE METER
EACH MISCELLANEOUS
Qty: 1 KIT | Refills: 0 | Status: SHIPPED | OUTPATIENT
Start: 2023-07-06

## 2023-07-07 ENCOUNTER — OFFICE VISIT (OUTPATIENT)
Dept: FAMILY MEDICINE CLINIC | Facility: CLINIC | Age: 63
End: 2023-07-07
Payer: MEDICARE

## 2023-07-07 VITALS
WEIGHT: 149 LBS | RESPIRATION RATE: 18 BRPM | DIASTOLIC BLOOD PRESSURE: 80 MMHG | OXYGEN SATURATION: 98 % | HEART RATE: 100 BPM | SYSTOLIC BLOOD PRESSURE: 110 MMHG | HEIGHT: 63 IN | TEMPERATURE: 98.2 F | BODY MASS INDEX: 26.4 KG/M2

## 2023-07-07 DIAGNOSIS — H92.02 LEFT EAR PAIN: ICD-10-CM

## 2023-07-07 DIAGNOSIS — G44.52 NEW DAILY PERSISTENT HEADACHE: ICD-10-CM

## 2023-07-07 DIAGNOSIS — G93.0 ARACHNOID CYST: ICD-10-CM

## 2023-07-07 DIAGNOSIS — G50.0 TRIGEMINAL NEURALGIA OF LEFT SIDE OF FACE: Primary | ICD-10-CM

## 2023-07-07 PROCEDURE — 99214 OFFICE O/P EST MOD 30 MIN: CPT | Performed by: FAMILY MEDICINE

## 2023-07-07 RX ORDER — PREDNISONE 10 MG/1
TABLET ORAL
Qty: 21 TABLET | Refills: 0 | Status: SHIPPED | OUTPATIENT
Start: 2023-07-07

## 2023-07-07 NOTE — PROGRESS NOTES
Assessment/Plan:         Diagnoses and all orders for this visit:    Trigeminal neuralgia of left side of face  -     predniSONE 10 mg tablet; 6 tabs on Day 1,5 tabs on Day 2,4 tabs on Day 3,3 tabs on Day 4,2 tabs on  Day 5And 1 tab on Day 6    Left ear pain  -     predniSONE 10 mg tablet; 6 tabs on Day 1,5 tabs on Day 2,4 tabs on Day 3,3 tabs on Day 4,2 tabs on  Day 5And 1 tab on Day 6    Arachnoid cyst  -     predniSONE 10 mg tablet; 6 tabs on Day 1,5 tabs on Day 2,4 tabs on Day 3,3 tabs on Day 4,2 tabs on  Day 5And 1 tab on Day 6  -     MRI brain wo contrast; Future    New daily persistent headache  -     predniSONE 10 mg tablet; 6 tabs on Day 1,5 tabs on Day 2,4 tabs on Day 3,3 tabs on Day 4,2 tabs on  Day 5And 1 tab on Day 6  -     MRI brain wo contrast; Future        CAT scan was normal  Her lab work ruled out temporal arteritis  This could be trigeminal neuralgia would but would be an atypical presentation  I do believe this actually is coming from her neck and is more related to spasm  Due to the severity of the pain and the location we will order an MRI to rule out any sort of other brain abnormality  However prednisone prescription given  Recommended heat pad and will consider physical therapy if needed  Patient is to follow-up in 1 to 2 weeks if not resolving        Subjective:     Chief Complaint   Patient presents with   • Earache     Left ear pain for- days         Patient ID: Richie Lovelace is a 70 y.o. female.     Patient presents today for follow-up of ER  Patient's been having significant left-sided pain that radiates from her ear up into her skull as well as down into her neck  She had an ER visit that a CAT scan of her temporal region that did not show any abnormalities  Patient believes she might have an infection  She has no other symptoms other than this pain that radiates up and down to her head  It is fairly constant but does relent every now and then      The following portions of the patient's history were reviewed and updated as appropriate: allergies, current medications, past family history, past medical history, past social history, past surgical history and problem list.    Review of Systems   Constitutional: Negative. HENT: Negative. Eyes: Negative. Respiratory: Negative. Cardiovascular: Negative. Gastrointestinal: Negative. Endocrine: Negative. Genitourinary: Negative. Musculoskeletal: Negative. Skin: Negative. Allergic/Immunologic: Negative. Neurological: Positive for headaches. Hematological: Negative. Psychiatric/Behavioral: Negative. All other systems reviewed and are negative. Objective:    Vitals:    07/07/23 0953   BP: 110/80   BP Location: Left arm   Patient Position: Sitting   Cuff Size: Large   Pulse: 100   Resp: 18   Temp: 98.2 °F (36.8 °C)   TempSrc: Tympanic   SpO2: 98%   Weight: 67.6 kg (149 lb)   Height: 5' 3" (1.6 m)          Physical Exam  Vitals and nursing note reviewed. Constitutional:       Appearance: She is well-developed. HENT:      Head: Normocephalic and atraumatic. Right Ear: External ear normal.      Left Ear: External ear normal.   Eyes:      Conjunctiva/sclera: Conjunctivae normal.      Pupils: Pupils are equal, round, and reactive to light. Cardiovascular:      Rate and Rhythm: Normal rate and regular rhythm. Heart sounds: Normal heart sounds. Pulmonary:      Effort: Pulmonary effort is normal.      Breath sounds: Normal breath sounds. Abdominal:      General: Bowel sounds are normal.      Palpations: Abdomen is soft. Musculoskeletal:         General: Normal range of motion. Cervical back: Normal range of motion. Comments: Very tight and spasmed paraspinal musculature in her trapezius neck and upper back area and muscles are tender to touch   Skin:     General: Skin is warm and dry. Neurological:      Mental Status: She is alert and oriented to person, place, and time. Deep Tendon Reflexes: Reflexes are normal and symmetric. Psychiatric:         Behavior: Behavior normal.         Thought Content:  Thought content normal.         Judgment: Judgment normal.

## 2023-07-10 ENCOUNTER — HOSPITAL ENCOUNTER (OUTPATIENT)
Dept: MRI IMAGING | Facility: HOSPITAL | Age: 63
Discharge: HOME | End: 2023-07-10
Payer: MEDICARE

## 2023-07-10 DIAGNOSIS — G93.0 ARACHNOID CYST: ICD-10-CM

## 2023-07-10 DIAGNOSIS — G44.52 NEW DAILY PERSISTENT HEADACHE: ICD-10-CM

## 2023-07-10 PROCEDURE — G1004 CDSM NDSC: HCPCS

## 2023-07-10 PROCEDURE — 70551 MRI BRAIN STEM W/O DYE: CPT

## 2023-07-17 ENCOUNTER — TELEPHONE (OUTPATIENT)
Dept: FAMILY MEDICINE CLINIC | Facility: CLINIC | Age: 63
End: 2023-07-17

## 2023-07-17 NOTE — TELEPHONE ENCOUNTER
Patient called. She said that her MRI came back negative. She is still having the head pain, but yesterday she had an episode where her heart rate went up to the 170's. She said it lasted about an hour and a half. I told her if it happens again, she needs to go to the ER. I told her I would check to see what you wanted her to do and I can call her back. She is aware you are out of the office until tomorrow. [phone]  Thank you.

## 2023-07-20 NOTE — TELEPHONE ENCOUNTER
Called and discussed  She is feeling better  We will continue to monitor her MRI was normal and she is feeling better

## 2023-07-24 ENCOUNTER — OFFICE VISIT (OUTPATIENT)
Dept: PAIN MEDICINE | Facility: CLINIC | Age: 63
End: 2023-07-24
Payer: MEDICARE

## 2023-07-24 VITALS
TEMPERATURE: 98.2 F | HEART RATE: 84 BPM | HEIGHT: 63 IN | WEIGHT: 149 LBS | SYSTOLIC BLOOD PRESSURE: 118 MMHG | DIASTOLIC BLOOD PRESSURE: 78 MMHG | BODY MASS INDEX: 26.4 KG/M2

## 2023-07-24 DIAGNOSIS — M47.816 LUMBAR SPONDYLOSIS: ICD-10-CM

## 2023-07-24 DIAGNOSIS — G57.01 PIRIFORMIS SYNDROME OF RIGHT SIDE: Primary | ICD-10-CM

## 2023-07-24 DIAGNOSIS — M96.1 LUMBAR POST-LAMINECTOMY SYNDROME: ICD-10-CM

## 2023-07-24 PROCEDURE — 99214 OFFICE O/P EST MOD 30 MIN: CPT | Performed by: PHYSICIAN ASSISTANT

## 2023-07-24 NOTE — PROGRESS NOTES
Assessment:  1. Piriformis syndrome of right side    2. Lumbar post-laminectomy syndrome    3. Lumbar spondylosis        Plan:  While the patient was in the office today, I did have a thorough conversation regarding their chronic pain syndrome, medication management, and treatment plan options. Patient underwent sacroiliac joint injection and greater trochanteric bursa injection last month and reported approximately 50% reduction in pain. However she still has quite a bit of pain in the low back and the right buttock region with sitting. The patient states that she occasionally has referred symptoms into the posterior thigh. While driving over here in the car which was a 25-minute car ride, her pain level increased to a 9 out of 10. Today we spent time discussing options. She wishes to stay conservative and avoid any further injection therapy if possible. I did discuss/offer to schedule a right piriformis injection. Prior to doing that however she is interested in a course of physical therapy to address these issues. I have placed that referral on today's visit and she will contact us if the pain should worsen. The patient was advised to contact the office should their symptoms worsen in the interim. The patient was agreeable and verbalized an understanding. History of Present Illness: The patient is a 70 y.o. female last seen on 6/6/2023 who presents for a follow up office visit in regards to chronic pain secondary to lumbar spondylosis, sacroiliitis, lumbar postlaminectomy pain syndrome. The patient currently reports chronic low back pain that she presently rates a 4-5 out of 10 on the pain scale and describes it as an intermittent sharp and shooting pain that radiates primarily into the right buttock region and the posterior thigh. She underwent a right sacroiliac joint injection and right greater trochanteric bursa injection which reduced approximately 50% of her pain.   She states that she has significant increase in the buttock pain with sitting. I have personally reviewed and/or updated the patient's past medical history, past surgical history, family history, social history, current medications, allergies, and vital signs today. Review of Systems:    Review of Systems   Respiratory: Negative for shortness of breath. Cardiovascular: Negative for chest pain. Gastrointestinal: Positive for diarrhea. Negative for constipation, nausea and vomiting. Musculoskeletal: Negative for arthralgias, gait problem, joint swelling and myalgias. Skin: Negative for rash. Neurological: Negative for dizziness, seizures and weakness. All other systems reviewed and are negative.         Past Medical History:   Diagnosis Date   • Anemia     Due to heavy menstruation   • Arthritis    • Asthma     Seasonal   • Diabetes mellitus ([address])    • GERD (gastroesophageal reflux disease)     Could be due to gastroparesis   • Hyperlipidemia    • Hypertension    • Pneumonia     Walking pneumonia       Past Surgical History:   Procedure Laterality Date   • ADENOIDECTOMY     • CHOLECYSTECTOMY     • [address]  , 2019 and     ACDF , Laminectomy  and fusion    • TONSILLECTOMY  1965       Family History   Problem Relation Age of Onset   • Cancer Mother         Unknown   • Clotting disorder Mother         Factor XI deficiency   • Coronary artery disease Father          at 46 years   • Diabetes Father    • Hypertension Father    • Diabetes type II Father    • Breast cancer Sister         52's   • Cancer Sister         Breast cancer twice   • COPD Sister    • No Known Problems Daughter    • No Known Problems Daughter    • No Known Problems Daughter    • No Known Problems Daughter    • No Known Problems Maternal Grandmother    • No Known Problems Maternal Grandfather    • No Known Problems Paternal Grandmother    • No Known Problems Paternal Grandfather    • No Known Problems Maternal Aunt    • Breast cancer Paternal Aunt         62's   • Cancer Paternal Aunt         Breast cancer   • Diabetes Paternal Aunt    • Hypertension Paternal Aunt    • Coronary artery disease Brother         Congestive Heart Failure   • Heart failure Brother    • Hypertension Brother    • Heart failure Paternal Uncle    • Hypertension Paternal Uncle    • Heart failure Paternal Uncle    • Hypertension Paternal Uncle    • Diabetes type II Family         Daughter   • Hypothyroidism Family         Daughter       Social History     Occupational History   • Not on file   Tobacco Use   • Smoking status: Former     Packs/day: 0.50     Years: 2.00     Total pack years: 1.00     Types: Cigarettes     Start date: 1983     Quit date: 1985     Years since quittin.5   • Smokeless tobacco: Never   • Tobacco comments:      years ago   Vaping Use   • Vaping Use: Never used   Substance and Sexual Activity   • Alcohol use: Yes     Comment: Occasional   • Drug use: Never   • Sexual activity: Not Currently     Partners: Male     Birth control/protection: Post-menopausal, Female Sterilization         Current Outpatient Medications:   •  atorvastatin (LIPITOR) 40 mg tablet, TAKE 1 TABLET DAILY, Disp: 90 tablet, Rfl: 0  •  dapagliflozin (Farxiga) 10 MG tablet, Take 1 tablet (10 mg total) by mouth daily, Disp: 90 tablet, Rfl: 3  •  glimepiride (AMARYL) 4 mg tablet, 1 tab bid., Disp: 180 tablet, Rfl: 3  •  ibuprofen (MOTRIN) 800 mg tablet, , Disp: , Rfl:   •  lisinopril (ZESTRIL) 20 mg tablet, Take 1 tablet (20 mg total) by mouth daily, Disp: 90 tablet, Rfl: 1  •  metFORMIN (GLUCOPHAGE) 1000 MG tablet, Take 1 tablet (1,000 mg total) by mouth 2 (two) times a day with meals, Disp: 180 tablet, Rfl: 3  •  Multiple Vitamin (MULTIVITAMIN ADULT PO), Take 1 tablet by mouth daily, Disp: , Rfl:   •  Omega-3 Fatty Acids (Fish Oil) 1200 MG CAPS, , Disp: , Rfl:   •  omeprazole (PriLOSEC) 40 MG capsule, Take 1 capsule (40 mg total) by mouth daily, Disp: 90 capsule, Rfl: 1  •  Rhubarb (ESTROVEN MENOPAUSE RELIEF PO), Take 1 tablet by mouth daily, Disp: , Rfl:   •  Symbicort 160-4.5 MCG/ACT inhaler, INHALE 2 PUFFS BY MOUTH 2 TIMES A DAY RINSE MOUTH AFTER USE., Disp: 30.6 g, Rfl: 2  •  TURMERIC PO, Take 500 mg by mouth daily, Disp: , Rfl:   •  Alcohol Swabs (Alcohol Prep) PADS, Use in the morning, Disp: 100 each, Rfl: 3  •  Blood Glucose Monitoring Suppl (ONE TOUCH ULTRA 2) w/Device KIT, Test once daily, Disp: 1 kit, Rfl: 0  •  Blood Glucose Monitoring Suppl (ONE TOUCH ULTRA MINI) w/Device KIT, As directed, Disp: 1 kit, Rfl: 0  •  EPINEPHrine (EPIPEN) 0.3 mg/0.3 mL SOAJ, Inject 0.3 mL (0.3 mg total) into a muscle once for 1 dose, Disp: 0.6 mL, Rfl: 0  •  glucose blood (OneTouch Ultra) test strip, Test once daily, Disp: 100 each, Rfl: 3  •  Lancets (onetouch ultrasoft) lancets, Test once daily, Disp: 100 each, Rfl: 3  •  predniSONE 10 mg tablet, 6 tabs on Day 1,5 tabs on Day 2,4 tabs on Day 3,3 tabs on Day 4,2 tabs on  Day 5And 1 tab on Day 6 (Patient not taking: Reported on 7/24/2023), Disp: 21 tablet, Rfl: 0    Allergies   Allergen Reactions   • Shellfish-Derivedproducts [Shellfish-Derived Products - Food Allergy] Anaphylaxis   • Phenytoin Hives   • Shellfish Allergy - Food Allergy Other (See Comments)       Physical Exam:    /78 (BP Location: Left arm, Patient Position: Sitting, Cuff Size: Standard)   Pulse 84   Temp 98.2 °F (36.8 °C)   Ht 5' 3" (1.6 m)   Wt 67.6 kg (149 lb)   BMI 26.39 kg/m²     Constitutional:normal, well developed, well nourished, alert, in no distress and non-toxic and no overt pain behavior.   Eyes:anicteric  HEENT:grossly intact  Neck:supple, symmetric, trachea midline and no masses   Pulmonary:even and unlabored  Cardiovascular:No edema or pitting edema present  Skin:Normal without rashes or lesions and well hydrated  Psychiatric:Mood and affect appropriate  Neurologic:Cranial Nerves II-XII grossly intact  Musculoskeletal: Lumbar scars from prior surgical intervention, well-healed, tenderness to palpation over the right sacroiliac joint, tenderness to palpation of the right piriformis.       Imaging  No orders to display         Orders Placed This Encounter   Procedures   • Ambulatory referral to Physical Therapy

## 2023-08-10 ENCOUNTER — OFFICE VISIT (OUTPATIENT)
Dept: URGENT CARE | Facility: CLINIC | Age: 63
End: 2023-08-10
Payer: MEDICARE

## 2023-08-10 ENCOUNTER — APPOINTMENT (OUTPATIENT)
Dept: RADIOLOGY | Facility: CLINIC | Age: 63
End: 2023-08-10
Payer: MEDICARE

## 2023-08-10 VITALS
OXYGEN SATURATION: 97 % | DIASTOLIC BLOOD PRESSURE: 65 MMHG | TEMPERATURE: 98.8 F | RESPIRATION RATE: 18 BRPM | SYSTOLIC BLOOD PRESSURE: 126 MMHG | HEART RATE: 90 BPM

## 2023-08-10 DIAGNOSIS — M54.42 ACUTE LEFT-SIDED LOW BACK PAIN WITH LEFT-SIDED SCIATICA: ICD-10-CM

## 2023-08-10 DIAGNOSIS — M25.552 PAIN OF LEFT HIP: Primary | ICD-10-CM

## 2023-08-10 DIAGNOSIS — M25.552 PAIN OF LEFT HIP: ICD-10-CM

## 2023-08-10 DIAGNOSIS — R07.81 RIB PAIN ON LEFT SIDE: ICD-10-CM

## 2023-08-10 DIAGNOSIS — L23.7 POISON IVY DERMATITIS: ICD-10-CM

## 2023-08-10 PROCEDURE — G0463 HOSPITAL OUTPT CLINIC VISIT: HCPCS | Performed by: PHYSICIAN ASSISTANT

## 2023-08-10 PROCEDURE — 99213 OFFICE O/P EST LOW 20 MIN: CPT | Performed by: PHYSICIAN ASSISTANT

## 2023-08-10 PROCEDURE — 72170 X-RAY EXAM OF PELVIS: CPT

## 2023-08-10 PROCEDURE — 72100 X-RAY EXAM L-S SPINE 2/3 VWS: CPT

## 2023-08-10 PROCEDURE — 71101 X-RAY EXAM UNILAT RIBS/CHEST: CPT

## 2023-08-10 RX ORDER — PREDNISONE 10 MG/1
TABLET ORAL
Qty: 24 TABLET | Refills: 0 | Status: SHIPPED | OUTPATIENT
Start: 2023-08-10 | End: 2023-08-22

## 2023-08-10 NOTE — PROGRESS NOTES
[facility]        NAME: Hyacinth Payton is a 70 y.o. female  : 1960    MRN: [id number]  DATE: August 10, 2023  TIME: 2:26 PM    Assessment and Plan   Pain of left hip [M25.552]  1. Pain of left hip  XR hip/pelv 2-3 vws left if performed      2. Acute left-sided low back pain with left-sided sciatica  XR spine lumbar 2 or 3 views injury      3. Rib pain on left side  CANCELED: XR ribs 2 vw left      4. Poison ivy dermatitis  predniSONE 10 mg tablet            Patient Instructions   I discussed with Blade Means that she seems to primarily have contusions. Rest and ice over the next few days. The prednisone will hopefully help with radicular complaints in addition to poison ivy rash. Poison Ivy- take Prednisone as directed - watch blood sugars as they may be elevated. Follow up with spine surgeon at [facility] for lumbar complaints  Rib contusions can take 4 weeks to heal, may use Tylenol for pain -do not use Ibuprofen, Advil, Motrin, Aleve while taking steroid  Apply ice to leg and back and ribs as needed   Follow up with PCP in 3-5 days. Proceed to  ER if symptoms worsen. Chief Complaint     Chief Complaint   Patient presents with   • Rash     Patient states she got poison ivy on Tues and it's getting worse and is now on her face. • Hip Pain     Patient fell onto her left hip at [facility] in [place] and an ambulance was called but patient refused to go and get checked at the hospital. Just wanted to go home and rest. Patient states that she woke up today feeling more pain in her hip and 2 toes on left foot feel numb. Wanted to come and get checked out. History of Present Illness       Rash  This is a new problem. The current episode started in the past 7 days. The problem has been rapidly worsening since onset. The rash is diffuse. The rash is characterized by redness and itchiness. She was exposed to poison ivy/oak. The rash first occurred at home.  Associated symptoms include facial edema and shortness of breath. Pertinent negatives include no cough, fever, sore throat or vomiting. (She states she is having a little difficulty swallowing - she believes this is secondary to tender lymph nodes, not actually difficulty in passing food due to swelling in her throat. She notes mild SOB which she attributes to her left sided rib/flank pain making it difficult to inspire deeply. She states the SOB only developed after the fall.  )   Hip Pain     Patient was at a pet store yesterday with her dog. She had the leash in her left hand when the dog started to saima another dog she lost her balance and fell onto her left side. She denies striking her head at time of injury. She states EMT was called but she declined transport to ED assuming her pain would improve with rest.  She presents today c/o severe pain from her left thoracolumbar region radiating around to her left side. She c/o pain with deep inspiration. She also notes new onset numbness in her left foot since the injury. She c/o mild pain along the left lateral hip where she landed. She also c/o more severe pain at the base of the buttock. She states it is very uncomfortable to sit due to the pain in her thigh. She has a h/o lumbar surgery. Review of Systems   Review of Systems   Constitutional: Negative for chills and fever. HENT: Positive for facial swelling and trouble swallowing. Negative for ear pain and sore throat. Eyes: Positive for itching. Negative for pain and visual disturbance. Respiratory: Positive for shortness of breath. Negative for cough. Cardiovascular: Negative for chest pain and palpitations. Gastrointestinal: Negative for abdominal pain and vomiting. Genitourinary: Negative for dysuria and hematuria. Musculoskeletal: Positive for back pain, gait problem and myalgias. Negative for arthralgias. Skin: Positive for rash. Negative for color change. Neurological: Negative for seizures and syncope. All other systems reviewed and are negative. Current Medications       Current Outpatient Medications:   •  predniSONE 10 mg tablet, Take 3 tablets (30 mg total) by mouth daily for 4 days, THEN 2 tablets (20 mg total) daily for 4 days, THEN 1 tablet (10 mg total) daily for 4 days. , Disp: 24 tablet, Rfl: 0  •  Alcohol Swabs (Alcohol Prep) PADS, Use in the morning, Disp: 100 each, Rfl: 3  •  atorvastatin (LIPITOR) 40 mg tablet, TAKE 1 TABLET DAILY, Disp: 90 tablet, Rfl: 0  •  Blood Glucose Monitoring Suppl (ONE TOUCH ULTRA 2) w/Device KIT, Test once daily, Disp: 1 kit, Rfl: 0  •  Blood Glucose Monitoring Suppl (ONE TOUCH ULTRA MINI) w/Device KIT, As directed, Disp: 1 kit, Rfl: 0  •  dapagliflozin (Farxiga) 10 MG tablet, Take 1 tablet (10 mg total) by mouth daily, Disp: 90 tablet, Rfl: 3  •  EPINEPHrine (EPIPEN) 0.3 mg/0.3 mL SOAJ, Inject 0.3 mL (0.3 mg total) into a muscle once for 1 dose, Disp: 0.6 mL, Rfl: 0  •  glimepiride (AMARYL) 4 mg tablet, 1 tab bid., Disp: 180 tablet, Rfl: 3  •  glucose blood (OneTouch Ultra) test strip, Test once daily, Disp: 100 each, Rfl: 3  •  ibuprofen (MOTRIN) 800 mg tablet, , Disp: , Rfl:   •  Lancets (onetouch ultrasoft) lancets, Test once daily, Disp: 100 each, Rfl: 3  •  lisinopril (ZESTRIL) 20 mg tablet, Take 1 tablet (20 mg total) by mouth daily, Disp: 90 tablet, Rfl: 1  •  metFORMIN (GLUCOPHAGE) 1000 MG tablet, Take 1 tablet (1,000 mg total) by mouth 2 (two) times a day with meals, Disp: 180 tablet, Rfl: 3  •  Multiple Vitamin (MULTIVITAMIN ADULT PO), Take 1 tablet by mouth daily, Disp: , Rfl:   •  Omega-3 Fatty Acids (Fish Oil) 1200 MG CAPS, , Disp: , Rfl:   •  omeprazole (PriLOSEC) 40 MG capsule, Take 1 capsule (40 mg total) by mouth daily, Disp: 90 capsule, Rfl: 1  •  Rhubarb (ESTROVEN MENOPAUSE RELIEF PO), Take 1 tablet by mouth daily, Disp: , Rfl:   •  Symbicort 160-4.5 MCG/ACT inhaler, INHALE 2 PUFFS BY MOUTH 2 TIMES A DAY RINSE MOUTH AFTER USE., Disp: 30.6 g, Rfl: 2  •  TURMERIC PO, Take 500 mg by mouth daily, Disp: , Rfl:     Current Allergies     Allergies as of 08/10/2023 - Reviewed 08/10/2023   Allergen Reaction Noted   • Shellfish-derivedproducts [shellfish-derived products - food allergy] Anaphylaxis 10/18/2018   • Phenytoin Hives 1985   • Shellfish allergy - food allergy Other (See Comments) 2022            The following portions of the patient's history were reviewed and updated as appropriate: allergies, current medications, past family history, past medical history, past social history, past surgical history and problem list.     Past Medical History:   Diagnosis Date   • Anemia     Due to heavy menstruation   • Arthritis    • Asthma     Seasonal   • Diabetes mellitus ([address] )    • GERD (gastroesophageal reflux disease)     Could be due to gastroparesis   • Hyperlipidemia    • Hypertension    • Pneumonia 2018    Walking pneumonia       Past Surgical History:   Procedure Laterality Date   • ADENOIDECTOMY  1965   • CHOLECYSTECTOMY     • [address]  ,  and     ACDF , Laminectomy  and fusion    • TONSILLECTOMY  1965       Family History   Problem Relation Age of Onset   • Cancer Mother         Unknown   • Clotting disorder Mother         Factor XI deficiency   • Coronary artery disease Father          at 46 years   • Diabetes Father    • Hypertension Father    • Diabetes type II Father    • Breast cancer Sister         52's   • Cancer Sister         Breast cancer twice   • COPD Sister    • No Known Problems Daughter    • No Known Problems Daughter    • No Known Problems Daughter    • No Known Problems Daughter    • No Known Problems Maternal Grandmother    • No Known Problems Maternal Grandfather    • No Known Problems Paternal Grandmother    • No Known Problems Paternal Grandfather    • No Known Problems Maternal Aunt    • Breast cancer Paternal Aunt         63's   • Cancer Paternal Aunt         Breast cancer   • Diabetes Paternal Aunt    • Hypertension Paternal Aunt    • Coronary artery disease Brother         Congestive Heart Failure   • Heart failure Brother    • Hypertension Brother    • Heart failure Paternal Uncle    • Hypertension Paternal Uncle    • Heart failure Paternal Uncle    • Hypertension Paternal Uncle    • Diabetes type II Family         Daughter   • Hypothyroidism Family         Daughter         Medications have been verified. Objective   /65   Pulse 90   Temp 98.8 °F (37.1 °C) (Tympanic)   Resp 18   SpO2 97%   No LMP recorded. Patient is postmenopausal.       Physical Exam     Physical Exam  Vitals and nursing note reviewed. Constitutional:       General: She is not in acute distress. Appearance: Normal appearance. HENT:      Head: Normocephalic and atraumatic. Mouth/Throat:      Mouth: Mucous membranes are moist.      Pharynx: Oropharynx is clear. No posterior oropharyngeal erythema. Cardiovascular:      Rate and Rhythm: Normal rate and regular rhythm. Pulses: Normal pulses. Heart sounds: Normal heart sounds. Pulmonary:      Effort: Pulmonary effort is normal.      Breath sounds: Normal breath sounds. Musculoskeletal:      Comments: She has 3 vertical incision around the levels of L3-S1 which are well healed. She is tender to palpation along the lumbar spinous processes- primarily L1-3 - just proximal to the incisions  Left lumbar paraspinal tenderness is mild to moderate. She is tender over the left lower ribs -approximately 11-12 at the mid-axillary and anterior axillary line    5/5 strength in all LE muscle groups with the exception of right hamstring -secondary to pain from pre-existing piriformis issue. DTR: 1+ patella bilat  TR Achilles bilat. Negative SLR bilat with the exception of right sided pain secondary to piriformis issues.       She is very tender over the left ischial tuberosity but has no pain with resisted hamstring strength. No visible ecchymosis along the left posterior thigh or gluteal region. Very minimal pain with ambulation. Skin:     General: Skin is warm and dry. Comments: Poison ivy rash around her left eye with a few spots present around the right eye. She also has areas on her right arm and a few scattered spots on her left abdominal region. Neurological:      Mental Status: She is alert and oriented to person, place, and time. Psychiatric:         Mood and Affect: Mood normal.         Behavior: Behavior normal.           Xray: lumbar spine 2 view:  Preliminary reading: no evidence of acute compression fracture. Screws and hardware appear in place from previous fusion L3-5. Significant left sided osteophyte formation at L2-3 with marked disc space narrowing at this level. Scoliotic curvature is present. Unfortunately no comparison views are available. Await final reading. Xray: left ribs 2 view  Preliminary reading: no acute fracture is visualized. Await final reading    Xray: AP Pelvis  Preliminary reading: no fracture or avulsion is visualized.   Await final reading

## 2023-08-10 NOTE — PATIENT INSTRUCTIONS
Poison Ivy- take Prednisone as directed - watch blood sugars as they may be elevated. Follow up with spine surgeon at [facility] for lumbar complaints  Rib contusions can take 4 weeks to heal, may use Tylenol for pain -do not use Ibuprofen, Advil, Motrin, Aleve while taking steroid  Apply ice to leg and back and ribs as needed   Follow up with PCP in 3-5 days. Proceed to  ER if symptoms worsen.

## 2023-09-08 ENCOUNTER — TELEPHONE (OUTPATIENT)
Dept: FAMILY MEDICINE CLINIC | Facility: CLINIC | Age: 63
End: 2023-09-08

## 2023-09-08 DIAGNOSIS — U07.1 COVID-19 VIRUS INFECTION: Primary | ICD-10-CM

## 2023-09-08 RX ORDER — BENZONATATE 100 MG/1
100 CAPSULE ORAL 3 TIMES DAILY PRN
Qty: 20 CAPSULE | Refills: 0 | Status: SHIPPED | OUTPATIENT
Start: 2023-09-08

## 2023-09-08 NOTE — TELEPHONE ENCOUNTER
Patient said that she tested positive for covid this past Sunday. She started with symptoms Saturday. She said that she hasn't been coughing badly and was wondering if you could prescribe something for the cough. She said her fever broke yesterday. I told her I would check to see what you recommended and I can call her back. [phone]  Thank you.

## 2023-09-23 ENCOUNTER — APPOINTMENT (OUTPATIENT)
Dept: LAB | Facility: CLINIC | Age: 63
End: 2023-09-23
Payer: MEDICARE

## 2023-09-23 DIAGNOSIS — E11.42 DIABETIC SENSORIMOTOR NEUROPATHY (HCC): ICD-10-CM

## 2023-09-23 DIAGNOSIS — I10 ESSENTIAL (PRIMARY) HYPERTENSION: ICD-10-CM

## 2023-09-23 DIAGNOSIS — E78.5 HYPERLIPIDEMIA, UNSPECIFIED HYPERLIPIDEMIA TYPE: ICD-10-CM

## 2023-09-23 LAB
ALBUMIN SERPL BCP-MCNC: 4.3 G/DL (ref 3.5–5)
ALP SERPL-CCNC: 100 U/L (ref 34–104)
ALT SERPL W P-5'-P-CCNC: 37 U/L (ref 7–52)
ANION GAP SERPL CALCULATED.3IONS-SCNC: 11 MMOL/L
AST SERPL W P-5'-P-CCNC: 29 U/L (ref 13–39)
BILIRUB SERPL-MCNC: 0.46 MG/DL (ref 0.2–1)
BUN SERPL-MCNC: 18 MG/DL (ref 5–25)
CALCIUM SERPL-MCNC: 9.7 MG/DL (ref 8.4–10.2)
CHLORIDE SERPL-SCNC: 109 MMOL/L (ref 96–108)
CHOLEST SERPL-MCNC: 149 MG/DL
CO2 SERPL-SCNC: 24 MMOL/L (ref 21–32)
CREAT SERPL-MCNC: 0.76 MG/DL (ref 0.6–1.3)
EST. AVERAGE GLUCOSE BLD GHB EST-MCNC: 183 MG/DL
GFR SERPL CREATININE-BSD FRML MDRD: 82 ML/MIN/1.73SQ M
GLUCOSE P FAST SERPL-MCNC: 150 MG/DL (ref 65–99)
HBA1C MFR BLD: 8 %
HDLC SERPL-MCNC: 44 MG/DL
LDLC SERPL CALC-MCNC: 65 MG/DL (ref 0–100)
POTASSIUM SERPL-SCNC: 4.3 MMOL/L (ref 3.5–5.3)
PROT SERPL-MCNC: 6.9 G/DL (ref 6.4–8.4)
SODIUM SERPL-SCNC: 144 MMOL/L (ref 135–147)
TRIGL SERPL-MCNC: 202 MG/DL
TSH SERPL DL<=0.05 MIU/L-ACNC: 2.46 UIU/ML (ref 0.45–4.5)

## 2023-09-23 PROCEDURE — 80061 LIPID PANEL: CPT

## 2023-09-23 PROCEDURE — 80053 COMPREHEN METABOLIC PANEL: CPT

## 2023-09-23 PROCEDURE — 36415 COLL VENOUS BLD VENIPUNCTURE: CPT

## 2023-09-23 PROCEDURE — 84443 ASSAY THYROID STIM HORMONE: CPT

## 2023-09-23 PROCEDURE — 83036 HEMOGLOBIN GLYCOSYLATED A1C: CPT

## 2023-09-26 ENCOUNTER — OFFICE VISIT (OUTPATIENT)
Dept: ENDOCRINOLOGY | Facility: CLINIC | Age: 63
End: 2023-09-26
Payer: MEDICARE

## 2023-09-26 VITALS
WEIGHT: 149 LBS | DIASTOLIC BLOOD PRESSURE: 78 MMHG | HEART RATE: 93 BPM | OXYGEN SATURATION: 99 % | BODY MASS INDEX: 26.4 KG/M2 | HEIGHT: 63 IN | SYSTOLIC BLOOD PRESSURE: 118 MMHG

## 2023-09-26 DIAGNOSIS — I10 HYPERTENSION, UNSPECIFIED TYPE: ICD-10-CM

## 2023-09-26 DIAGNOSIS — E11.42 TYPE 2 DIABETES MELLITUS WITH DIABETIC POLYNEUROPATHY, WITHOUT LONG-TERM CURRENT USE OF INSULIN (HCC): Primary | ICD-10-CM

## 2023-09-26 DIAGNOSIS — E78.5 HYPERLIPIDEMIA, UNSPECIFIED HYPERLIPIDEMIA TYPE: ICD-10-CM

## 2023-09-26 PROCEDURE — 99214 OFFICE O/P EST MOD 30 MIN: CPT | Performed by: PHYSICIAN ASSISTANT

## 2023-09-26 NOTE — PROGRESS NOTES
Established Patient Progress Note      Chief Complaint   Patient presents with   • Diabetes Type 2        Impression & Plan:    Problem List Items Addressed This Visit        Endocrine    Diabetes mellitus ([address]) - Primary     Type 2 Diabetes control has worsened since the last visit- likely due to stopping Januvia, multiple treatments with steroids, and illness with COVID. For now, she has 60 day supply of Januvia at home and will resume Januvia. Check BG 1x per day and send log for review. She is also hoping to become more active as she starts to feel better. She will look into cost of Glyxambi combination (Jardiance/Tradjenta) to see if this would be less expensive than taking Januvia and Shama and let us know if she would like RX. Lab Results   Component Value Date    HGBA1C 8.0 (H) 09/23/2023            Relevant Medications    sitaGLIPtin (Januvia) 100 mg tablet    Other Relevant Orders    Hemoglobin O0G    Basic metabolic panel       Cardiovascular and Mediastinum    Hypertension     Well controlled on current regimen. Other    Hyperlipidemia     Improving, continue statin. Orders Placed This Encounter   Procedures   • Hemoglobin A1C     Standing Status:   Future     Number of Occurrences:   1     Standing Expiration Date:   9/26/2024   • Basic metabolic panel     This is a patient instruction: Patient fasting for 8 hours or longer recommended. Standing Status:   Future     Number of Occurrences:   1     Standing Expiration Date:   9/26/2024       History of Present Illness:   Chase Gracia is a 70 y.o. female with a history of type 2 diabetes without long term use of insulin since about 15 years ago. Reports complications of none. Denies recent severe hypoglycemic or severe hyperglycemic episodes.  Denies any issues with her current regimen. home glucose monitoring: are performed regularly 1x per day and most blood sugars are above 150. (rarely above 200)    Sine last visit, Januvia was stopped due to excellent A1C and cost.   After last visit, she needed steroid injections in hip and back  Then, she was on steroids x 11 days due to poison Ivy.   9/2, had severe case of COVID and was very sick for a few weeks  (did not need hospitalization or treatment with steroids)  She has been less active and no longer walking 3x per week but is now doing PT. Diet has been good.      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[address])   • Gastro-esophageal reflux disease with esophagitis   • Paroxysmal supraventricular tachycardia (HCC)   • Pain in left ankle and joints of left foot   • Environmental allergies   • Hip pain, right   • Sacroiliitis ([address])      Past Medical History:   Diagnosis Date   • Anemia 2008    Due to heavy menstruation   • Arthritis    • Asthma 2005    Seasonal   • Diabetes mellitus ([address]) 2006   • GERD (gastroesophageal reflux disease) 2008    Could be due to gastroparesis   • Hyperlipidemia    • Hypertension 2005   • Pneumonia 2018    Walking pneumonia      Past Surgical History:   Procedure Laterality Date   • ADENOIDECTOMY  1965   • CHOLECYSTECTOMY  2006   • SPINE SURGERY  2018, 2019 and 2021    ACDF 2018, Laminectomy 2019 and fusion 2021   • TONSILLECTOMY  1965      Family History   Problem Relation Age of Onset   • Cancer Mother         Unknown   • Clotting disorder Mother         Factor XI deficiency   • Coronary artery disease Father  at 46 years   • Diabetes Father    • Hypertension Father    • Diabetes type II Father    • Breast cancer Sister         52's   • Cancer Sister         Breast cancer twice   • COPD Sister    • No Known Problems Daughter    • No Known Problems Daughter    • No Known Problems Daughter    • No Known Problems Daughter    • No Known Problems Maternal Grandmother    • No Known Problems Maternal Grandfather    • No Known Problems Paternal Grandmother    • No Known Problems Paternal Grandfather    • No Known Problems Maternal Aunt    • Breast cancer Paternal Aunt         63's   • Cancer Paternal Aunt         Breast cancer   • Diabetes Paternal Aunt    • Hypertension Paternal Aunt    • Coronary artery disease Brother         Congestive Heart Failure   • Heart failure Brother    • Hypertension Brother    • Heart failure Paternal Uncle    • Hypertension Paternal Uncle    • Heart failure Paternal Uncle    • Hypertension Paternal Uncle    • Diabetes type II Family         Daughter   • Hypothyroidism Family         Daughter     Social History     Tobacco Use   • Smoking status: Former     Packs/day: 0.50     Years: 2.00     Total pack years: 1.00     Types: Cigarettes     Start date: 1983     Quit date: 1985     Years since quittin.7   • Smokeless tobacco: Never   • Tobacco comments:      years ago   Substance Use Topics   • Alcohol use: Yes     Comment: Occasional     Allergies   Allergen Reactions   • Shellfish-Derivedproducts [Shellfish-Derived Products - Food Allergy] Anaphylaxis   • Phenytoin Hives   • Shellfish Allergy - Food Allergy Other (See Comments)         Current Outpatient Medications:   •  Alcohol Swabs (Alcohol Prep) PADS, Use in the morning, Disp: 100 each, Rfl: 3  •  atorvastatin (LIPITOR) 40 mg tablet, TAKE 1 TABLET DAILY, Disp: 90 tablet, Rfl: 0  •  Blood Glucose Monitoring Suppl (ONE TOUCH ULTRA 2) w/Device KIT, Test once daily, Disp: 1 kit, Rfl: 0  •  Blood Glucose Monitoring Suppl (ONE TOUCH ULTRA MINI) w/Device KIT, As directed, Disp: 1 kit, Rfl: 0  •  dapagliflozin (Farxiga) 10 MG tablet, Take 1 tablet (10 mg total) by mouth daily, Disp: 90 tablet, Rfl: 3  •  EPINEPHrine (EPIPEN) 0.3 mg/0.3 mL SOAJ, Inject 0.3 mL (0.3 mg total) into a muscle once for 1 dose, Disp: 0.6 mL, Rfl: 0  •  glimepiride (AMARYL) 4 mg tablet, 1 tab bid., Disp: 180 tablet, Rfl: 3  •  glucose blood (OneTouch Ultra) test strip, Test once daily, Disp: 100 each, Rfl: 3  •  ibuprofen (MOTRIN) 800 mg tablet, As needed, Disp: , Rfl:   •  Lancets (onetouch ultrasoft) lancets, Test once daily, Disp: 100 each, Rfl: 3  •  lisinopril (ZESTRIL) 20 mg tablet, Take 1 tablet (20 mg total) by mouth daily, Disp: 90 tablet, Rfl: 1  •  metFORMIN (GLUCOPHAGE) 1000 MG tablet, Take 1 tablet (1,000 mg total) by mouth 2 (two) times a day with meals, Disp: 180 tablet, Rfl: 3  •  Multiple Vitamin (MULTIVITAMIN ADULT PO), Take 1 tablet by mouth daily, Disp: , Rfl:   •  Omega-3 Fatty Acids (Fish Oil) 1200 MG CAPS, , Disp: , Rfl:   •  omeprazole (PriLOSEC) 40 MG capsule, Take 1 capsule (40 mg total) by mouth daily, Disp: 90 capsule, Rfl: 1  •  Rhubarb (ESTROVEN MENOPAUSE RELIEF PO), Take 1 tablet by mouth daily, Disp: , Rfl:   •  sitaGLIPtin (Januvia) 100 mg tablet, Take 1 tablet (100 mg total) by mouth daily, Disp: , Rfl:   •  Symbicort 160-4.5 MCG/ACT inhaler, INHALE 2 PUFFS BY MOUTH 2 TIMES A DAY RINSE MOUTH AFTER USE., Disp: 30.6 g, Rfl: 2  •  TURMERIC PO, Take 500 mg by mouth daily, Disp: , Rfl:   •  benzonatate (TESSALON PERLES) 100 mg capsule, Take 1 capsule (100 mg total) by mouth 3 (three) times a day as needed for cough (Patient not taking: Reported on 9/26/2023), Disp: 20 capsule, Rfl: 0    Review of Systems   Constitutional: Negative for activity change, appetite change, chills, diaphoresis, fatigue, fever and unexpected weight change. HENT: Negative for trouble swallowing and voice change. Eyes: Negative for visual disturbance. Respiratory: Negative for shortness of breath. Cardiovascular: Negative for chest pain and palpitations. Gastrointestinal: Negative for abdominal pain, constipation and diarrhea. Endocrine: Negative for cold intolerance, heat intolerance, polydipsia, polyphagia and polyuria. Genitourinary: Negative for frequency and menstrual problem. Musculoskeletal: Positive for arthralgias and back pain. Negative for myalgias. Skin: Negative for rash. Allergic/Immunologic: Negative for food allergies. Neurological: Negative for dizziness and tremors. Hematological: Negative for adenopathy. Psychiatric/Behavioral: Negative for sleep disturbance. All other systems reviewed and are negative. Physical Exam:  Body mass index is 26.39 kg/m². /78 (BP Location: Left arm, Patient Position: Sitting, Cuff Size: Adult)   Pulse 93   Ht 5' 3" (1.6 m)   Wt 67.6 kg (149 lb)   SpO2 99%   BMI 26.39 kg/m²    Wt Readings from Last 3 Encounters:   09/26/23 67.6 kg (149 lb)   07/24/23 67.6 kg (149 lb)   07/07/23 67.6 kg (149 lb)       Physical Exam  Vitals reviewed. Constitutional:       General: She is not in acute distress. Appearance: She is well-developed. HENT:      Head: Normocephalic and atraumatic. Eyes:      Conjunctiva/sclera: Conjunctivae normal.      Pupils: Pupils are equal, round, and reactive to light. Neck:      Thyroid: No thyromegaly. Cardiovascular:      Rate and Rhythm: Normal rate and regular rhythm. Heart sounds: Normal heart sounds. Pulmonary:      Effort: Pulmonary effort is normal. No respiratory distress. Breath sounds: Normal breath sounds. No wheezing or rales. Abdominal:      General: Bowel sounds are normal. There is no distension. Palpations: Abdomen is soft. Tenderness: There is no abdominal tenderness. Musculoskeletal:         General: Normal range of motion. Cervical back: Normal range of motion and neck supple.    Skin:     General: Skin is warm and dry. Neurological:      Mental Status: She is alert and oriented to person, place, and time. Labs:   Lab Results   Component Value Date    HGBA1C 8.0 (H) 09/23/2023    HGBA1C 6.4 (H) 05/18/2023    HGBA1C 6.4 (H) 12/29/2022     Lab Results   Component Value Date    CREATININE 0.76 09/23/2023    CREATININE 0.67 07/05/2023    CREATININE 0.80 05/18/2023    BUN 18 09/23/2023    K 4.3 09/23/2023     (H) 09/23/2023    CO2 24 09/23/2023     eGFR   Date Value Ref Range Status   09/23/2023 82 ml/min/1.73sq m Final     Lab Results   Component Value Date    HDL 44 (L) 09/23/2023    TRIG 202 (H) 09/23/2023     Lab Results   Component Value Date    ALT 37 09/23/2023    AST 29 09/23/2023    ALKPHOS 100 09/23/2023     Lab Results   Component Value Date    QTP8XRCMLDJB 2.455 09/23/2023     No results found for: "Aby Johnie", "TSI"          Discussed with the patient and all questioned fully answered. She will call me if any problems arise. Follow-up appointment in 3 months.      Counseled patient on diagnostic results, prognosis, risk and benefit of treatment options, instruction for management, importance of treatment compliance, Risk  factor reduction and impressions    Patient Instructions   [email or web address] PA-C

## 2023-09-26 NOTE — ASSESSMENT & PLAN NOTE
Type 2 Diabetes control has worsened since the last visit- likely due to stopping Januvia, multiple treatments with steroids, and illness with COVID. For now, she has 60 day supply of Januvia at home and will resume Januvia. Check BG 1x per day and send log for review. She is also hoping to become more active as she starts to feel better. She will look into cost of Glyxambi combination (Jardiance/Tradjenta) to see if this would be less expensive than taking Januvia and Shmaa and let us know if she would like RX.    Lab Results   Component Value Date    HGBA1C 8.0 (H) 09/23/2023

## 2023-11-02 DIAGNOSIS — E78.5 HYPERLIPIDEMIA, UNSPECIFIED HYPERLIPIDEMIA TYPE: ICD-10-CM

## 2023-11-02 RX ORDER — ATORVASTATIN CALCIUM 40 MG/1
TABLET, FILM COATED ORAL
Qty: 90 TABLET | Refills: 0 | Status: SHIPPED | OUTPATIENT
Start: 2023-11-02

## 2023-11-06 ENCOUNTER — OFFICE VISIT (OUTPATIENT)
Dept: GYNECOLOGY | Facility: CLINIC | Age: 63
End: 2023-11-06
Payer: MEDICARE

## 2023-11-06 VITALS
WEIGHT: 151.2 LBS | SYSTOLIC BLOOD PRESSURE: 108 MMHG | BODY MASS INDEX: 27.82 KG/M2 | DIASTOLIC BLOOD PRESSURE: 60 MMHG | HEIGHT: 62 IN

## 2023-11-06 DIAGNOSIS — Z01.419 ENCOUNTER FOR ANNUAL ROUTINE GYNECOLOGICAL EXAMINATION: Primary | ICD-10-CM

## 2023-11-06 DIAGNOSIS — Z12.31 ENCOUNTER FOR SCREENING MAMMOGRAM FOR BREAST CANCER: ICD-10-CM

## 2023-11-06 DIAGNOSIS — Z78.0 ASYMPTOMATIC MENOPAUSE: ICD-10-CM

## 2023-11-06 PROCEDURE — G0101 CA SCREEN;PELVIC/BREAST EXAM: HCPCS | Performed by: OBSTETRICS & GYNECOLOGY

## 2023-11-06 PROCEDURE — G0145 SCR C/V CYTO,THINLAYER,RESCR: HCPCS | Performed by: OBSTETRICS & GYNECOLOGY

## 2023-11-06 NOTE — PROGRESS NOTES
Assessment/Plan:    Pap with reflex done today    mammogram reviewed with her including breast density. RX given for next year   Discussed self breast exams    colon cancer screening-colonoscopy done in November 2018    Sleep disturbance-she was given information on sleep hygiene. She will stop taking Estroven. She has been on it for many years. I feel that this point it is most likely not helping but if she needs it, she can restart it. DEXA ordered and she will schedule at her convenience    discussed preventive care, regular exercise and a healthy diet      No problem-specific Assessment & Plan notes found for this encounter. Diagnoses and all orders for this visit:    Encounter for annual routine gynecological examination    Encounter for screening mammogram for breast cancer  -     Mammo screening bilateral w 3d & cad; Future    Asymptomatic menopause  -     DXA bone density spine hip and pelvis; Future          Subjective:      Patient ID: Александр Camacho is a 70 y.o. female. New patient-70year-old female presents for yearly. She has been getting regular GYN care. She has no complaints today. She believes that she had an abnormal Pap about 5 years ago and had a colposcopy. Follow-up has been normal.  We do not have these results. She went through an uneventful menopause in 2014. She has been taking Estroven since then for mild hot flashes and sleep disturbance. She does have vaginal estrogen at home but has not been using it regularly. She is not sexually active    History of vaginal delivery x5  Surgical history includes tubal, cholecystectomy, neck and back surgeries  History of factor XI deficiency. This was noted when she had very heavy menstrual cycles. She has occasionally seen hematology over the years but at the last visit, she was told she did not need to return  Past medical history reviewed    Normal 3D mammogram last month with scattered fibroglandular densities.   Normal DEXA in 2019    Her daughter is currently undergoing a genetic testing work-up. She had a [facility] 1 mutation however she has no manifestations of this and she is undergoing further work-up. The following portions of the patient's history were reviewed and updated as appropriate: allergies, current medications, past family history, past medical history, past social history, past surgical history, and problem list.    Review of Systems   Constitutional: Negative. Gastrointestinal: Negative. Genitourinary: Negative. Objective:      /60 (BP Location: Left arm, Patient Position: Sitting)   Ht 5' 2.25" (1.581 m)   Wt 68.6 kg (151 lb 3.2 oz)   BMI 27.43 kg/m²          Physical Exam  Vitals reviewed. Constitutional:       Appearance: Normal appearance. She is well-developed. Neck:      Thyroid: No thyromegaly. Trachea: No tracheal deviation. Cardiovascular:      Rate and Rhythm: Normal rate and regular rhythm. Pulmonary:      Effort: Pulmonary effort is normal.      Breath sounds: Normal breath sounds. Chest:   Breasts:     Breasts are symmetrical.      Right: No inverted nipple, mass, nipple discharge, skin change or tenderness. Left: No inverted nipple, mass, nipple discharge, skin change or tenderness. Abdominal:      General: There is no distension. Palpations: Abdomen is soft. There is no mass. Tenderness: There is no abdominal tenderness. Genitourinary:     Labia:         Right: No rash, tenderness, lesion or injury. Left: No rash, tenderness, lesion or injury. Vagina: Normal.      Cervix: No cervical motion tenderness, discharge or friability. Adnexa:         Right: No mass, tenderness or fullness. Left: No mass, tenderness or fullness. Rectum: Normal.      Comments: Vagina and cervix are atrophic  Os is stenotic, Cytobrush only partially inserted for Pap  Neurological:      Mental Status: She is alert.

## 2023-11-07 DIAGNOSIS — I10 HYPERTENSION, UNSPECIFIED TYPE: ICD-10-CM

## 2023-11-07 DIAGNOSIS — K21.00 GASTROESOPHAGEAL REFLUX DISEASE WITH ESOPHAGITIS, UNSPECIFIED WHETHER HEMORRHAGE: ICD-10-CM

## 2023-11-07 RX ORDER — LISINOPRIL 20 MG/1
20 TABLET ORAL DAILY
Qty: 90 TABLET | Refills: 1 | Status: SHIPPED | OUTPATIENT
Start: 2023-11-07

## 2023-11-07 RX ORDER — OMEPRAZOLE 40 MG/1
40 CAPSULE, DELAYED RELEASE ORAL DAILY
Qty: 90 CAPSULE | Refills: 1 | Status: SHIPPED | OUTPATIENT
Start: 2023-11-07

## 2023-11-10 LAB
LAB AP GYN PRIMARY INTERPRETATION: NORMAL
Lab: NORMAL

## 2023-11-14 ENCOUNTER — TELEPHONE (OUTPATIENT)
Dept: ENDOCRINOLOGY | Facility: CLINIC | Age: 63
End: 2023-11-14

## 2023-11-14 DIAGNOSIS — E11.42 TYPE 2 DIABETES MELLITUS WITH DIABETIC POLYNEUROPATHY, WITHOUT LONG-TERM CURRENT USE OF INSULIN (HCC): Primary | ICD-10-CM

## 2023-11-14 RX ORDER — EMPAGLIFLOZIN AND LINAGLIPTIN 25; 5 MG/1; MG/1
TABLET, FILM COATED ORAL
Qty: 30 TABLET | Refills: 5 | Status: SHIPPED | OUTPATIENT
Start: 2023-11-14

## 2023-11-14 NOTE — TELEPHONE ENCOUNTER
Patient called stating she would like to start [facility] since it will be cheaper for 30 days supply with refills. Please send it to [facility] in [place].

## 2023-11-15 DIAGNOSIS — E11.42 TYPE 2 DIABETES MELLITUS WITH DIABETIC POLYNEUROPATHY, WITHOUT LONG-TERM CURRENT USE OF INSULIN (HCC): ICD-10-CM

## 2023-11-15 RX ORDER — GLIMEPIRIDE 4 MG/1
TABLET ORAL
Qty: 180 TABLET | Refills: 1 | Status: SHIPPED | OUTPATIENT
Start: 2023-11-15

## 2023-12-12 ENCOUNTER — OFFICE VISIT (OUTPATIENT)
Dept: PAIN MEDICINE | Facility: CLINIC | Age: 63
End: 2023-12-12
Payer: MEDICARE

## 2023-12-12 ENCOUNTER — APPOINTMENT (OUTPATIENT)
Dept: RADIOLOGY | Facility: CLINIC | Age: 63
End: 2023-12-12
Payer: MEDICARE

## 2023-12-12 VITALS
DIASTOLIC BLOOD PRESSURE: 82 MMHG | HEIGHT: 62 IN | SYSTOLIC BLOOD PRESSURE: 124 MMHG | TEMPERATURE: 97.8 F | HEART RATE: 93 BPM | BODY MASS INDEX: 27.23 KG/M2 | WEIGHT: 148 LBS

## 2023-12-12 DIAGNOSIS — M47.812 CERVICAL SPONDYLOSIS: ICD-10-CM

## 2023-12-12 DIAGNOSIS — M54.2 NECK PAIN: ICD-10-CM

## 2023-12-12 DIAGNOSIS — M79.18 CERVICAL MYOFASCIAL PAIN SYNDROME: ICD-10-CM

## 2023-12-12 DIAGNOSIS — M79.18 CERVICAL MYOFASCIAL PAIN SYNDROME: Primary | ICD-10-CM

## 2023-12-12 PROCEDURE — 72050 X-RAY EXAM NECK SPINE 4/5VWS: CPT

## 2023-12-12 PROCEDURE — 99214 OFFICE O/P EST MOD 30 MIN: CPT | Performed by: ANESTHESIOLOGY

## 2023-12-12 RX ORDER — CYCLOBENZAPRINE HCL 5 MG
5 TABLET ORAL
Qty: 30 TABLET | Refills: 1 | Status: SHIPPED | OUTPATIENT
Start: 2023-12-12

## 2023-12-12 NOTE — PROGRESS NOTES
Assessment  1. Cervical myofascial pain syndrome - Left    2. Cervical spondylosis    3. Neck pain - Left        Plan    Patient with myofascial pain most likely superimposed on facet arthropathy. I will start her on Flexeril 5 mg at bedtime. Did review the potential side effects of the medication if she has any problems or questions she understands to discontinue the medication and give her office a call. Will obtain cervical radiographs to rule out any significant pathology. Explained the diagnosis and rationale for trigger point injections. Provided literature for home review. Will schedule the patient for trigger point in the near future. Patient expressed understanding and appreciation. Will have the patient undergo a course of physical therapy for myofascial release as well as nerve glides, prescription was provided. My impressions and treatment recommendations were discussed in detail with the patient who verbalized understanding and had no further questions. Discharge instructions were provided. I personally saw and examined the patient and I agree with the above discussed plan of care. This note is created using dictation transcription. It may contain typographical errors, grammatical errors, improperly dictated words, background noise and other errors. Orders Placed This Encounter   Procedures    XR spine cervical complete 4 or 5 vw non injury     Standing Status:   Future     Standing Expiration Date:   12/12/2027     Scheduling Instructions:      Bring along any outside films relating to this procedure.            Order Specific Question:   Reason for Exam:     Answer:   left neck pain    Ambulatory referral to Physical Therapy     Standing Status:   Future     Standing Expiration Date:   12/12/2024     Referral Priority:   Routine     Referral Type:   Physical Therapy     Referral Reason:   Specialty Services Required     Requested Specialty:   Physical Therapy     Number of Visits Requested:   1     Expiration Date:   12/12/2024     New Medications Ordered This Visit   Medications    cyclobenzaprine (FLEXERIL) 5 mg tablet     Sig: Take 1 tablet (5 mg total) by mouth daily at bedtime as needed for muscle spasms     Dispense:  30 tablet     Refill:  1       History of Present Illness    Cony Park is a 70 y.o. female with 8 out of 10 left-sided neck pain shoulder pain and occasionally pain down her left arm. She is unaware of any clear precipitant event denies any trauma or injury. It is worse in the morning and at night described as constant achy with a shooting sensation. It interferes with her daily living activities. She denies any weakness. I have personally reviewed and/or updated the patient's past medical history, past surgical history, family history, social history, current medications, allergies, and vital signs today. Review of Systems   Constitutional:  Negative for fever and unexpected weight change. HENT:  Negative for trouble swallowing. Eyes:  Negative for visual disturbance. Respiratory:  Negative for shortness of breath and wheezing. Cardiovascular:  Negative for chest pain and palpitations. Gastrointestinal:  Negative for constipation, diarrhea, nausea and vomiting. Endocrine: Negative for cold intolerance, heat intolerance and polydipsia. Genitourinary:  Negative for difficulty urinating and frequency. Musculoskeletal:  Negative for arthralgias, gait problem, joint swelling (Joint stiffness) and myalgias. Decreased ROM   Skin:  Negative for rash. Neurological:  Negative for dizziness, seizures, syncope, weakness and headaches. Hematological:  Does not bruise/bleed easily. Psychiatric/Behavioral:  Negative for dysphoric mood. All other systems reviewed and are negative.       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[address])    Gastro-esophageal reflux disease with esophagitis    Paroxysmal supraventricular tachycardia    Pain in left ankle and joints of left foot    Environmental allergies    Hip pain, right    Sacroiliitis ([address])       Past Medical History:   Diagnosis Date    Anemia 2008    Due to heavy menstruation    Arthritis     Asthma 2005    Seasonal    Diabetes mellitus ([address]) 2006    GERD (gastroesophageal reflux disease)     Could be due to gastroparesis    Hyperlipidemia     Hypertension 2005    Pneumonia 2018    Walking pneumonia       Past Surgical History:   Procedure Laterality Date    [address]  2006    [address] [facility] SURGERY  2018, 2019 and     ACDF 2018, Laminectomy 2019 and fusion     TONSILLECTOMY  1965       Family History   Problem Relation Age of Onset    Cancer Mother         Unknown    Clotting disorder Mother         Factor XI deficiency    Coronary artery disease Father          at 46 years    Diabetes Father     Hypertension Father     Diabetes type II Father     Breast cancer Sister         52's    Cancer Sister         Breast cancer twice    COPD Sister     No Known Problems Daughter     No Known Problems Daughter     No Known Problems Daughter     No Known Problems Daughter     No Known Problems Maternal Grandmother     No Known Problems Maternal Grandfather     No Known Problems Paternal Grandmother     No Known Problems Paternal Grandfather     No Known Problems Maternal Aunt     Breast cancer Paternal Aunt         63's    Cancer Paternal Aunt         Breast cancer    Diabetes Paternal Aunt     Hypertension Paternal Aunt     Coronary artery disease Brother         Congestive Heart Failure    Heart failure Brother     Hypertension Brother     Heart failure Paternal Uncle     Hypertension Paternal Uncle     Heart failure Paternal Uncle     Hypertension Paternal Uncle     Diabetes type II Family         Daughter    Hypothyroidism Family         Daughter       Social History     Occupational History    Not on file   Tobacco Use    Smoking status: Former     Packs/day: 0.50     Years: 2.00     Total pack years: 1.00     Types: Cigarettes     Start date: 1983     Quit date: 1985     Years since quittin.9    Smokeless tobacco: Never    Tobacco comments:      years ago   Vaping Use    Vaping Use: Never used   Substance and Sexual Activity    Alcohol use: Yes     Comment: Occasional    Drug use: Never    Sexual activity: Not Currently     Partners: Male     Birth control/protection: Post-menopausal, Female Sterilization       Current Outpatient Medications on File Prior to Visit   Medication Sig    Alcohol Swabs (Alcohol Prep) PADS Use in the morning    atorvastatin (LIPITOR) 40 mg tablet TAKE 1 TABLET DAILY    Blood Glucose Monitoring Suppl (ONE TOUCH ULTRA 2) w/Device KIT Test once daily    Empagliflozin-linaGLIPtin (Glyxambi) 25-5 MG TABS Take 1 tab daily    glimepiride (AMARYL) 4 mg tablet 1 tab bid. glucose blood (OneTouch Ultra) test strip Test once daily    ibuprofen (MOTRIN) 800 mg tablet As needed    Lancets (onetouch ultrasoft) lancets Test once daily    lisinopril (ZESTRIL) 20 mg tablet TAKE 1 TABLET DAILY    metFORMIN (GLUCOPHAGE) 1000 MG tablet Take 1 tablet (1,000 mg total) by mouth 2 (two) times a day with meals    Multiple Vitamin (MULTIVITAMIN ADULT PO) Take 1 tablet by mouth daily    Omega-3 Fatty Acids (Fish Oil) 1200 MG CAPS     omeprazole (PriLOSEC) 40 MG capsule TAKE 1 CAPSULE DAILY    Rhubarb (ESTROVEN MENOPAUSE RELIEF PO) Take 1 tablet by mouth daily    Symbicort 160-4.5 MCG/ACT inhaler INHALE 2 PUFFS BY MOUTH 2 TIMES A DAY RINSE MOUTH AFTER USE.     TURMERIC PO Take 500 mg by mouth daily    EPINEPHrine (EPIPEN) 0.3 mg/0.3 mL SOAJ Inject 0.3 mL (0.3 mg total) into a muscle once for 1 dose     No current facility-administered medications on file prior to visit. Allergies   Allergen Reactions    Shellfish-Derivedproducts [Shellfish-Derived Products - Food Allergy] Anaphylaxis    Phenytoin Hives    Shellfish Allergy - Food Allergy Other (See Comments)       Physical Exam    /82 (BP Location: Left arm, Patient Position: Sitting, Cuff Size: Standard)   Pulse 93   Temp 97.8 °F (36.6 °C)   Ht 5' 2.25" (1.581 m)   Wt 67.1 kg (148 lb)   BMI 26.85 kg/m²     Constitutional: normal, well developed, well nourished, alert, in no distress and non-toxic and no overt pain behavior. Eyes: anicteric  HEENT: grossly intact  Neck: supple, symmetric, trachea midline and no masses   Pulmonary:even and unlabored  Cardiovascular:No edema or pitting edema present  Skin:Normal without rashes or lesions and well hydrated  Psychiatric:Mood and affect appropriate  Neurologic:Cranial Nerves II-XII grossly intact  Musculoskeletal:normal,  Inspection: Normal station and gait. Normal cervical curves and head posture. Skin intact without erythema. No sensory loss. There is no atrophy. Palpation: There is tenderness to palpation overlying the left cervical paraspinals, cervical facet joints. There is no tenderness over the upper trapezius muscles bilateral. No shoulder tenderness  Motor/Strength: Equal strength in the bilateral upper extremities  Reflexes: equal and symmetric in the upper limbs. Sensation: Sensation intact to light touch and pinprick in the upper limbs. Maneuvers: Negative Spurling's maneuver. Negative Lhermitte's sign.

## 2023-12-15 ENCOUNTER — PROCEDURE VISIT (OUTPATIENT)
Dept: PAIN MEDICINE | Facility: CLINIC | Age: 63
End: 2023-12-15
Payer: MEDICARE

## 2023-12-15 VITALS
BODY MASS INDEX: 27.23 KG/M2 | DIASTOLIC BLOOD PRESSURE: 78 MMHG | HEART RATE: 99 BPM | TEMPERATURE: 98 F | SYSTOLIC BLOOD PRESSURE: 122 MMHG | HEIGHT: 62 IN | WEIGHT: 148 LBS

## 2023-12-15 DIAGNOSIS — M79.18 CERVICAL MYOFASCIAL PAIN SYNDROME: Primary | ICD-10-CM

## 2023-12-15 PROCEDURE — 20553 NJX 1/MLT TRIGGER POINTS 3/>: CPT | Performed by: ANESTHESIOLOGY

## 2023-12-15 RX ORDER — LIDOCAINE HYDROCHLORIDE 20 MG/ML
2.5 INJECTION, SOLUTION INFILTRATION; PERINEURAL
Status: COMPLETED | OUTPATIENT
Start: 2023-12-15 | End: 2023-12-15

## 2023-12-15 RX ADMIN — LIDOCAINE HYDROCHLORIDE 2.5 ML: 20 INJECTION, SOLUTION INFILTRATION; PERINEURAL at 15:00

## 2023-12-15 NOTE — PROGRESS NOTES
Universal Protocol:  Consent: Verbal consent obtained. Written consent obtained. Risks and benefits: risks, benefits and alternatives were discussed  Consent given by: patient  Time out: Immediately prior to procedure a "time out" was called to verify the correct patient, procedure, equipment, support staff and site/side marked as required. Timeout called at: 12/15/2023 2:35 PM.  Patient understanding: patient states understanding of the procedure being performed  Patient consent: the patient's understanding of the procedure matches consent given  Procedure consent: procedure consent matches procedure scheduled  Site marked: the operative site was marked  Patient identity confirmed: verbally with patient  Supporting Documentation  Indications: pain   Trigger Point Injections: multiple trigger points: 3 or more muscle groups    Injection site identified by: palpation  Procedure Details  Location(s):    Neck/Upper Back: L upper trapezius, L levator scapulae and L cervical paraspinals     Prep: patient was prepped and draped in usual sterile fashion  Needle size: 27 G  Medications: 2.5 mL lidocaine 2 %  Patient tolerance: patient tolerated the procedure well with no immediate complications  Additional procedure details:   After written and verbal consent was obtained the area was cleaned and prepped aseptically. Again using sterile technique and localizing the 4 trigger points the 27-gauge 1-1/4 inch needle was injected into the trigger point. After negative aspiration approximately 1 cc of 2% lidocaine was injected. After proper anesthesia was obtained the area was dry needled. This was repeated for the other trigger points. The needle was withdrawn and homeostasis obtained. Complications: none. Disposition: Motor function was intact. Vital signs stable. The patient was discharged home with a . The discharge instruction sheet was given to the patient.  The patient was discharged with instructions to call immediately if there are any complications.

## 2023-12-21 LAB
BUN SERPL-MCNC: 18 MG/DL (ref 8–27)
BUN/CREAT SERPL: 25 (ref 12–28)
CALCIUM SERPL-MCNC: 9.6 MG/DL (ref 8.7–10.3)
CHLORIDE SERPL-SCNC: 104 MMOL/L (ref 96–106)
CO2 SERPL-SCNC: 23 MMOL/L (ref 20–29)
CREAT SERPL-MCNC: 0.72 MG/DL (ref 0.57–1)
EGFR: 93 ML/MIN/1.73
EST. AVERAGE GLUCOSE BLD GHB EST-MCNC: 154 MG/DL
GLUCOSE SERPL-MCNC: 68 MG/DL (ref 70–99)
HBA1C MFR BLD: 7 % (ref 4.8–5.6)
POTASSIUM SERPL-SCNC: 4.2 MMOL/L (ref 3.5–5.2)
SODIUM SERPL-SCNC: 143 MMOL/L (ref 134–144)

## 2023-12-22 ENCOUNTER — TELEPHONE (OUTPATIENT)
Dept: PAIN MEDICINE | Facility: CLINIC | Age: 63
End: 2023-12-22

## 2023-12-27 ENCOUNTER — OFFICE VISIT (OUTPATIENT)
Dept: ENDOCRINOLOGY | Facility: CLINIC | Age: 63
End: 2023-12-27
Payer: MEDICARE

## 2023-12-27 VITALS
OXYGEN SATURATION: 98 % | DIASTOLIC BLOOD PRESSURE: 82 MMHG | HEART RATE: 76 BPM | WEIGHT: 147.8 LBS | HEIGHT: 62 IN | SYSTOLIC BLOOD PRESSURE: 102 MMHG | BODY MASS INDEX: 27.2 KG/M2

## 2023-12-27 DIAGNOSIS — E78.5 HYPERLIPIDEMIA, UNSPECIFIED HYPERLIPIDEMIA TYPE: ICD-10-CM

## 2023-12-27 DIAGNOSIS — E11.9 TYPE 2 DIABETES MELLITUS WITHOUT COMPLICATION, WITHOUT LONG-TERM CURRENT USE OF INSULIN (HCC): ICD-10-CM

## 2023-12-27 DIAGNOSIS — E11.42 TYPE 2 DIABETES MELLITUS WITH DIABETIC POLYNEUROPATHY, WITHOUT LONG-TERM CURRENT USE OF INSULIN (HCC): Primary | ICD-10-CM

## 2023-12-27 DIAGNOSIS — I10 HYPERTENSION, UNSPECIFIED TYPE: ICD-10-CM

## 2023-12-27 PROCEDURE — 99214 OFFICE O/P EST MOD 30 MIN: CPT | Performed by: PHYSICIAN ASSISTANT

## 2023-12-27 NOTE — PROGRESS NOTES
Established Patient Progress Note    Chief Complaint:  Diabetes follow up visit    Impression & Plan:    Problem List Items Addressed This Visit        Endocrine    Type 2 diabetes mellitus without complication, without long-term current use of insulin (HCC) - Primary     Diabetes control has improved. Continue current regimen and lifestyle modification .   Discussed hypoglycemia risks with glimepiride and she should contact office between visits if she develops hypoglycemia.  If she develops recurrent GI side effects she will let us know and we can reduce Metformin dose and/or change to Metformin ER.   Lab Results   Component Value Date    HGBA1C 7.0 (H) 12/20/2023             Cardiovascular and Mediastinum    Hypertension     Well controlled on lisinopril.             Other    Hyperlipidemia     Continue statin.          Relevant Orders    Lipid Panel with Direct LDL reflex    Comprehensive metabolic panel       Orders Placed This Encounter   Procedures   • Hemoglobin A1C     Standing Status:   Future     Number of Occurrences:   1     Standing Expiration Date:   12/27/2024   • Lipid Panel with Direct LDL reflex     This is a patient instruction: This test requires patient fasting for 10-12 hours or longer. Drinking of black coffee or black tea is acceptable.     Standing Status:   Future     Number of Occurrences:   1     Standing Expiration Date:   12/27/2024   • Comprehensive metabolic panel     This is a patient instruction: Patient fasting for 8 hours or longer recommended.     Standing Status:   Future     Number of Occurrences:   1     Standing Expiration Date:   12/27/2024   • Albumin / creatinine urine ratio     Standing Status:   Future     Number of Occurrences:   1     Standing Expiration Date:   12/27/2024       History of Present Illness:   Jada Hui is a 63 y.o. female with a history of type 2 diabetes without long term use of insulin since about 15 years ago. Reports complications of none.  Denies recent illness or hospitalizations. Denies recent severe hypoglycemic or severe hyperglycemic episodes. Denies any issues with her current regimen. home glucose monitoring: are performed regularly.     Since starting Glyxambi, blood sugars have been much better in the low 100s.     Was having some abdominal pain/diarrhea dn was off metformin for a few weeks in October, but has resumed metformin and feeling fine.     Current regimen:  Metformin 1000mg BID  Glimepiride 4mg BID  Glyxambi 25/5    Last Eye Exam: Was seen in october  Last Foot Exam: UTD    Has hypertension: Taking lisinopril  Has hyperlipidemia: Taking atorvastatin     Patient Active Problem List   Diagnosis   • Asthma   • Type 2 diabetes mellitus without complication, without long-term current use of insulin (HCC)   • Family history of anesthesia complication   • Gastroparesis   • History of seizure   • Hyperlipidemia   • Hypertension   • Lumbar stenosis   • Cervical post-laminectomy syndrome   • 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HCC)   • Gastro-esophageal reflux disease with esophagitis   • Paroxysmal supraventricular tachycardia   • Pain in left ankle and joints of left foot   • Environmental allergies   • Hip pain, right   • Sacroiliitis (HCC)      Past Medical History:   Diagnosis Date   • Anemia 2008    Due to heavy menstruation   • Arthritis    • Asthma 2005    Seasonal   • Diabetes mellitus (HCC) 2006   • GERD (gastroesophageal reflux disease) 2008    Could be due to gastroparesis   • Hyperlipidemia    • Hypertension 2005   • Pneumonia 2018    Walking pneumonia      Past Surgical History:   Procedure Laterality Date   • ADENOIDECTOMY  1965   • CHOLECYSTECTOMY  2006   • SPINE SURGERY  2018, 2019 and 2021    ACDF 2018, Laminectomy 2019 and fusion 2021   • TONSILLECTOMY  1965      Family History   Problem Relation Age of Onset   • Cancer Mother         Unknown   • Clotting disorder Mother         Factor XI deficiency   • Coronary artery disease  Father          at 52 years   • Diabetes Father    • Hypertension Father    • Diabetes type II Father    • Breast cancer Sister         50's   • Cancer Sister         Breast cancer twice   • COPD Sister    • No Known Problems Daughter    • No Known Problems Daughter    • No Known Problems Daughter    • No Known Problems Daughter    • No Known Problems Maternal Grandmother    • No Known Problems Maternal Grandfather    • No Known Problems Paternal Grandmother    • No Known Problems Paternal Grandfather    • No Known Problems Maternal Aunt    • Breast cancer Paternal Aunt         60's   • Cancer Paternal Aunt         Breast cancer   • Diabetes Paternal Aunt    • Hypertension Paternal Aunt    • Coronary artery disease Brother         Congestive Heart Failure   • Heart failure Brother    • Hypertension Brother    • Heart failure Paternal Uncle    • Hypertension Paternal Uncle    • Heart failure Paternal Uncle    • Hypertension Paternal Uncle    • Diabetes type II Family         Daughter   • Hypothyroidism Family         Daughter     Social History     Tobacco Use   • Smoking status: Former     Current packs/day: 0.00     Average packs/day: 0.5 packs/day for 2.0 years (1.0 ttl pk-yrs)     Types: Cigarettes     Start date: 1983     Quit date: 1985     Years since quittin.0   • Smokeless tobacco: Never   • Tobacco comments:      years ago   Substance Use Topics   • Alcohol use: Yes     Comment: Occasional     Allergies   Allergen Reactions   • Shellfish-Derivedproducts [Shellfish-Derived Products - Food Allergy] Anaphylaxis   • Phenytoin Hives   • Shellfish Allergy - Food Allergy Other (See Comments)         Current Outpatient Medications:   •  Alcohol Swabs (Alcohol Prep) PADS, Use in the morning, Disp: 100 each, Rfl: 3  •  atorvastatin (LIPITOR) 40 mg tablet, TAKE 1 TABLET DAILY, Disp: 90 tablet, Rfl: 0  •  Blood Glucose Monitoring Suppl (ONE TOUCH ULTRA 2) w/Device KIT, Test once daily, Disp: 1 kit,  Rfl: 0  •  cyclobenzaprine (FLEXERIL) 5 mg tablet, Take 1 tablet (5 mg total) by mouth daily at bedtime as needed for muscle spasms, Disp: 30 tablet, Rfl: 1  •  Empagliflozin-linaGLIPtin (Glyxambi) 25-5 MG TABS, Take 1 tab daily, Disp: 30 tablet, Rfl: 5  •  glimepiride (AMARYL) 4 mg tablet, 1 tab bid., Disp: 180 tablet, Rfl: 1  •  glucose blood (OneTouch Ultra) test strip, Test once daily, Disp: 100 each, Rfl: 3  •  ibuprofen (MOTRIN) 800 mg tablet, As needed, Disp: , Rfl:   •  Lancets (onetouch ultrasoft) lancets, Test once daily, Disp: 100 each, Rfl: 3  •  lisinopril (ZESTRIL) 20 mg tablet, TAKE 1 TABLET DAILY, Disp: 90 tablet, Rfl: 1  •  metFORMIN (GLUCOPHAGE) 1000 MG tablet, Take 1 tablet (1,000 mg total) by mouth 2 (two) times a day with meals, Disp: 180 tablet, Rfl: 3  •  Multiple Vitamin (MULTIVITAMIN ADULT PO), Take 1 tablet by mouth daily, Disp: , Rfl:   •  Omega-3 Fatty Acids (Fish Oil) 1200 MG CAPS, , Disp: , Rfl:   •  omeprazole (PriLOSEC) 40 MG capsule, TAKE 1 CAPSULE DAILY, Disp: 90 capsule, Rfl: 1  •  Rhubarb (ESTROVEN MENOPAUSE RELIEF PO), Take 1 tablet by mouth daily, Disp: , Rfl:   •  Symbicort 160-4.5 MCG/ACT inhaler, INHALE 2 PUFFS BY MOUTH 2 TIMES A DAY RINSE MOUTH AFTER USE., Disp: 30.6 g, Rfl: 2  •  TURMERIC PO, Take 500 mg by mouth daily, Disp: , Rfl:   •  EPINEPHrine (EPIPEN) 0.3 mg/0.3 mL SOAJ, Inject 0.3 mL (0.3 mg total) into a muscle once for 1 dose, Disp: 0.6 mL, Rfl: 0    Review of Systems   Constitutional:  Negative for activity change, appetite change, chills, diaphoresis, fatigue, fever and unexpected weight change.   HENT:  Negative for trouble swallowing and voice change.    Eyes:  Negative for visual disturbance.   Respiratory:  Negative for shortness of breath.    Cardiovascular:  Negative for chest pain and palpitations.   Gastrointestinal:  Negative for abdominal pain, constipation and diarrhea.   Endocrine: Negative for cold intolerance, heat intolerance, polydipsia,  "polyphagia and polyuria.   Genitourinary:  Negative for frequency and menstrual problem.   Musculoskeletal:  Positive for arthralgias. Negative for myalgias.   Skin:  Negative for rash.   Allergic/Immunologic: Negative for food allergies.   Neurological:  Negative for dizziness and tremors.   Hematological:  Negative for adenopathy.   Psychiatric/Behavioral:  Negative for sleep disturbance.    All other systems reviewed and are negative.      Physical Exam:  Body mass index is 26.82 kg/m².  /82 (BP Location: Left arm, Patient Position: Sitting, Cuff Size: Adult)   Pulse 76   Ht 5' 2.25\" (1.581 m)   Wt 67 kg (147 lb 12.8 oz)   SpO2 98%   BMI 26.82 kg/m²    Wt Readings from Last 3 Encounters:   12/27/23 67 kg (147 lb 12.8 oz)   12/15/23 67.1 kg (148 lb)   12/12/23 67.1 kg (148 lb)       Physical Exam  Vitals reviewed.   Constitutional:       General: She is not in acute distress.     Appearance: Normal appearance. She is well-developed. She is not toxic-appearing.   HENT:      Head: Normocephalic and atraumatic.   Eyes:      Conjunctiva/sclera: Conjunctivae normal.      Pupils: Pupils are equal, round, and reactive to light.   Neck:      Thyroid: No thyromegaly.   Cardiovascular:      Rate and Rhythm: Normal rate and regular rhythm.      Heart sounds: Normal heart sounds.   Pulmonary:      Effort: Pulmonary effort is normal. No respiratory distress.      Breath sounds: Normal breath sounds. No wheezing or rales.   Abdominal:      General: Bowel sounds are normal. There is no distension.      Palpations: Abdomen is soft.      Tenderness: There is no abdominal tenderness.   Musculoskeletal:         General: Normal range of motion.      Cervical back: Normal range of motion and neck supple.   Skin:     General: Skin is warm and dry.   Neurological:      Mental Status: She is alert and oriented to person, place, and time.   Psychiatric:         Mood and Affect: Mood normal.         Behavior: Behavior normal. " "        Labs:   Lab Results   Component Value Date    HGBA1C 7.0 (H) 12/20/2023    HGBA1C 8.0 (H) 09/23/2023    HGBA1C 6.4 (H) 05/18/2023     Lab Results   Component Value Date    CREATININE 0.72 12/20/2023    CREATININE 0.76 09/23/2023    CREATININE 0.67 07/05/2023    BUN 18 12/20/2023    K 4.2 12/20/2023     12/20/2023    CO2 23 12/20/2023     eGFR   Date Value Ref Range Status   12/20/2023 93 >59 mL/min/1.73 Final   09/23/2023 82 ml/min/1.73sq m Final     Lab Results   Component Value Date    HDL 44 (L) 09/23/2023    TRIG 202 (H) 09/23/2023     Lab Results   Component Value Date    ALT 37 09/23/2023    AST 29 09/23/2023    ALKPHOS 100 09/23/2023     Lab Results   Component Value Date    ZGC2DOJOWVJW 2.455 09/23/2023     No results found for: \"FREET4\", \"TSI\"          Discussed with the patient and all questioned fully answered. She will call me if any problems arise.    Follow-up appointment in 4 months.     Counseled patient on diagnostic results, prognosis, risk and benefit of treatment options, instruction for management, importance of treatment compliance, Risk  factor reduction and impressions    There are no Patient Instructions on file for this visit.    Lit Ramsey PA-C  "

## 2023-12-27 NOTE — ASSESSMENT & PLAN NOTE
Diabetes control has improved. Continue current regimen and lifestyle modification .   Discussed hypoglycemia risks with glimepiride and she should contact office between visits if she develops hypoglycemia.  If she develops recurrent GI side effects she will let us know and we can reduce Metformin dose and/or change to Metformin ER.   Lab Results   Component Value Date    HGBA1C 7.0 (H) 12/20/2023

## 2023-12-27 NOTE — TELEPHONE ENCOUNTER
Pain level 8  Improvement 30% patient stopped taking flexeril and would like to know what she is able to take for pain

## 2023-12-28 NOTE — TELEPHONE ENCOUNTER
Caller: Jada Hui     Doctor: Dr Hayes    Reason for call: Patient returning call from nurse please advise     Call back#: [phone]

## 2024-01-09 ENCOUNTER — OFFICE VISIT (OUTPATIENT)
Dept: PAIN MEDICINE | Facility: CLINIC | Age: 64
End: 2024-01-09
Payer: MEDICARE

## 2024-01-09 VITALS
WEIGHT: 147 LBS | SYSTOLIC BLOOD PRESSURE: 116 MMHG | HEIGHT: 62 IN | DIASTOLIC BLOOD PRESSURE: 72 MMHG | HEART RATE: 69 BPM | BODY MASS INDEX: 27.05 KG/M2 | TEMPERATURE: 98 F

## 2024-01-09 DIAGNOSIS — M54.2 NECK PAIN: ICD-10-CM

## 2024-01-09 DIAGNOSIS — M79.18 MYOFASCIAL PAIN SYNDROME: Primary | ICD-10-CM

## 2024-01-09 DIAGNOSIS — M96.1 CERVICAL POST-LAMINECTOMY SYNDROME: ICD-10-CM

## 2024-01-09 DIAGNOSIS — M47.812 CERVICAL SPONDYLOSIS: ICD-10-CM

## 2024-01-09 PROCEDURE — 99214 OFFICE O/P EST MOD 30 MIN: CPT | Performed by: PHYSICIAN ASSISTANT

## 2024-01-09 RX ORDER — TIZANIDINE 4 MG/1
4 TABLET ORAL
Qty: 30 TABLET | Refills: 2 | Status: SHIPPED | OUTPATIENT
Start: 2024-01-09

## 2024-01-09 NOTE — PROGRESS NOTES
Assessment:  1. Myofascial pain syndrome    2. Cervical post-laminectomy syndrome    3. Cervical spondylosis    4. Neck pain        Plan:  While the patient was in the office today, I did have a thorough conversation regarding their chronic pain syndrome, medication management, and treatment plan options.    After discussing options, we will discontinue cyclobenzaprine and proceed with a trial of tizanidine 4 mg nightly as needed.  I advised the patient that they should not drive or operate machinery while on this medication until they see how it affects them, as it could cause lethargy and mental cloudiness. I advised the patient to call our office if they experience any side effects or issues with the medication changes. The patient verbalized an understanding.    She will continue physical therapy as she just started it and is finding some benefit.    Consider MRI of the cervical spine if pain worsens.    The patient will follow-up in 8 weeks for medication prescription refill and reevaluation. The patient was advised to contact the office should their symptoms worsen in the interim. The patient was agreeable and verbalized an understanding.        History of Present Illness:    The patient is a 63 y.o. female last seen on 12/15/2023 who presents for a follow up office visit in regards to chronic left-sided neck pain secondary to postlaminectomy pain syndrome, degenerative changes and myofascial features the patient currently reports left-sided neck pain that she presently rates a 6 out of 10 and describes it as a constant sharp, cramping and shooting pain that radiates laterally into the shoulder and shoulder blade.  She has been attending physical therapy which is providing about 25% relief.  She would like to continue this treatment.  Unfortunately the trigger point injections only provided her with a few days of relief.  She stopped the cyclobenzaprine as she developed increased muscle cramps for some reason.   X-ray did reveal degenerative changes and moderate left foraminal stenosis at C3-4.    I have personally reviewed and/or updated the patient's past medical history, past surgical history, family history, social history, current medications, allergies, and vital signs today.       Review of Systems:    Review of Systems   Respiratory:  Negative for shortness of breath.    Cardiovascular:  Negative for chest pain.   Gastrointestinal:  Negative for constipation, diarrhea, nausea and vomiting.   Musculoskeletal:  Negative for arthralgias, gait problem, joint swelling (Joint stiffness) and myalgias.   Skin:  Negative for rash.   Neurological:  Negative for dizziness, seizures and weakness.   All other systems reviewed and are negative.        Past Medical History:   Diagnosis Date   • Anemia     Due to heavy menstruation   • Arthritis    • Asthma     Seasonal   • Diabetes mellitus (HCC)    • GERD (gastroesophageal reflux disease)     Could be due to gastroparesis   • Hyperlipidemia    • Hypertension    • Pneumonia 2018    Walking pneumonia       Past Surgical History:   Procedure Laterality Date   • ADENOIDECTOMY  1965   • CHOLECYSTECTOMY     • SPINE SURGERY  , 2019 and     ACDF , Laminectomy  and fusion    • TONSILLECTOMY  1965       Family History   Problem Relation Age of Onset   • Cancer Mother         Unknown   • Clotting disorder Mother         Factor XI deficiency   • Coronary artery disease Father          at 52 years   • Diabetes Father    • Hypertension Father    • Diabetes type II Father    • Breast cancer Sister         50's   • Cancer Sister         Breast cancer twice   • COPD Sister    • No Known Problems Daughter    • No Known Problems Daughter    • No Known Problems Daughter    • No Known Problems Daughter    • No Known Problems Maternal Grandmother    • No Known Problems Maternal Grandfather    • No Known Problems Paternal Grandmother    • No Known  Problems Paternal Grandfather    • No Known Problems Maternal Aunt    • Breast cancer Paternal Aunt         60's   • Cancer Paternal Aunt         Breast cancer   • Diabetes Paternal Aunt    • Hypertension Paternal Aunt    • Coronary artery disease Brother         Congestive Heart Failure   • Heart failure Brother    • Hypertension Brother    • Heart failure Paternal Uncle    • Hypertension Paternal Uncle    • Heart failure Paternal Uncle    • Hypertension Paternal Uncle    • Diabetes type II Family         Daughter   • Hypothyroidism Family         Daughter       Social History     Occupational History   • Not on file   Tobacco Use   • Smoking status: Former     Current packs/day: 0.00     Average packs/day: 0.5 packs/day for 2.0 years (1.0 ttl pk-yrs)     Types: Cigarettes     Start date: 1983     Quit date: 1985     Years since quittin.0   • Smokeless tobacco: Never   • Tobacco comments:      years ago   Vaping Use   • Vaping status: Never Used   Substance and Sexual Activity   • Alcohol use: Yes     Comment: Occasional   • Drug use: Never   • Sexual activity: Not Currently     Partners: Male     Birth control/protection: Post-menopausal, Female Sterilization         Current Outpatient Medications:   •  atorvastatin (LIPITOR) 40 mg tablet, TAKE 1 TABLET DAILY, Disp: 90 tablet, Rfl: 0  •  Empagliflozin-linaGLIPtin (Glyxambi) 25-5 MG TABS, Take 1 tab daily, Disp: 30 tablet, Rfl: 5  •  glimepiride (AMARYL) 4 mg tablet, 1 tab bid., Disp: 180 tablet, Rfl: 1  •  lisinopril (ZESTRIL) 20 mg tablet, TAKE 1 TABLET DAILY, Disp: 90 tablet, Rfl: 1  •  metFORMIN (GLUCOPHAGE) 1000 MG tablet, Take 1 tablet (1,000 mg total) by mouth 2 (two) times a day with meals, Disp: 180 tablet, Rfl: 3  •  Multiple Vitamin (MULTIVITAMIN ADULT PO), Take 1 tablet by mouth daily, Disp: , Rfl:   •  Omega-3 Fatty Acids (Fish Oil) 1200 MG CAPS, , Disp: , Rfl:   •  omeprazole (PriLOSEC) 40 MG capsule, TAKE 1 CAPSULE DAILY, Disp: 90  "capsule, Rfl: 1  •  Rhubarb (ESTROVEN MENOPAUSE RELIEF PO), Take 1 tablet by mouth daily, Disp: , Rfl:   •  Symbicort 160-4.5 MCG/ACT inhaler, INHALE 2 PUFFS BY MOUTH 2 TIMES A DAY RINSE MOUTH AFTER USE., Disp: 30.6 g, Rfl: 2  •  tiZANidine (ZANAFLEX) 4 mg tablet, Take 1 tablet (4 mg total) by mouth daily at bedtime, Disp: 30 tablet, Rfl: 2  •  TURMERIC PO, Take 500 mg by mouth daily, Disp: , Rfl:   •  Alcohol Swabs (Alcohol Prep) PADS, Use in the morning, Disp: 100 each, Rfl: 3  •  Blood Glucose Monitoring Suppl (ONE TOUCH ULTRA 2) w/Device KIT, Test once daily, Disp: 1 kit, Rfl: 0  •  cyclobenzaprine (FLEXERIL) 5 mg tablet, Take 1 tablet (5 mg total) by mouth daily at bedtime as needed for muscle spasms (Patient not taking: Reported on 1/9/2024), Disp: 30 tablet, Rfl: 1  •  EPINEPHrine (EPIPEN) 0.3 mg/0.3 mL SOAJ, Inject 0.3 mL (0.3 mg total) into a muscle once for 1 dose, Disp: 0.6 mL, Rfl: 0  •  glucose blood (OneTouch Ultra) test strip, Test once daily, Disp: 100 each, Rfl: 3  •  ibuprofen (MOTRIN) 800 mg tablet, As needed (Patient not taking: Reported on 1/9/2024), Disp: , Rfl:   •  Lancets (onetouch ultrasoft) lancets, Test once daily, Disp: 100 each, Rfl: 3    Allergies   Allergen Reactions   • Shellfish-Derivedproducts [Shellfish-Derived Products - Food Allergy] Anaphylaxis   • Phenytoin Hives   • Shellfish Allergy - Food Allergy Other (See Comments)       Physical Exam:    /72 (BP Location: Left arm, Patient Position: Sitting, Cuff Size: Standard)   Pulse 69   Temp 98 °F (36.7 °C)   Ht 5' 2.25\" (1.581 m)   Wt 66.7 kg (147 lb)   BMI 26.67 kg/m²     Constitutional:normal, well developed, well nourished, alert, in no distress and non-toxic and no overt pain behavior.  Eyes:anicteric  HEENT:grossly intact  Neck:supple, symmetric, trachea midline and no masses   Pulmonary:even and unlabored  Cardiovascular:No edema or pitting edema present  Skin:Normal without rashes or lesions and well " hydrated  Psychiatric:Mood and affect appropriate  Neurologic:Cranial Nerves II-XII grossly intact  Musculoskeletal: Anterior cervical scar from prior surgery, limited range of motion with left rotation and extension, tenderness and tightness to palpation over the left paraspinal and left trapezius muscles      Imaging  CERVICAL SPINE     INDICATION:   Myalgia, other site. Spondylosis without myelopathy or radiculopathy, cervical region.      COMPARISON: Comparison made with previous examination(s) dated (DX) 10-Aug-2023,(DX) 20-Oct-2022.      VIEWS:  XR SPINE CERVICAL COMPLETE 4 OR 5 VW NON INJURY   Images: 6     FINDINGS:     Anterior spinal fusion C5-C7 with intact hardware.     No fracture.      Normal alignment without subluxation.     There is a moderate facet disease in the mid to lower cervical spine.     The intervertebral disc spaces are preserved.      There is moderate neuroforaminal narrowing at C3-C4 on the left with mild changes on the right.     The prevertebral soft tissues are within normal limits.       The lung apices are clear.     IMPRESSION:        Moderate facet disease.  Moderate neuroforaminal narrowing at C3-C4 on the left with mild changes on the right.  No hardware failure about the C5 C7 anterior fusion     No orders to display         No orders of the defined types were placed in this encounter.

## 2024-01-09 NOTE — PATIENT INSTRUCTIONS
Tizanidine (By mouth)   Tizanidine ([id number]-tia)  Treats muscle spasticity.   Brand Name(s): Zanaflex, Zanaflex Capsule   There may be other brand names for this medicine.  When This Medicine Should Not Be Used:   This medicine is not right for everyone. Do not use if you had an allergic reaction to tizanidine.  How to Use This Medicine:   Capsule, Tablet  Take your medicine as directed. Your dose may need to be changed several times to find what works best for you.  You may take this medicine with or without food, but always take it the same way every time. Tizanidine works differently depending on whether you take it on an empty stomach or a full stomach. Talk to your doctor if you have any questions about this.  Missed dose: Take a dose as soon as you remember. If it is almost time for your next dose, wait until then and take a regular dose. Do not take extra medicine to make up for a missed dose.  Store the medicine in a closed container at room temperature, away from heat, moisture, and direct light.  Drugs and Foods to Avoid:   Ask your doctor or pharmacist before using any other medicine, including over-the-counter medicines, vitamins, and herbal products.  Do not use this medicine together with ciprofloxacin or fluvoxamine.  Some foods and medicines can affect how tizanidine works. Tell your doctor if you are using any of the following:  Acyclovir, baclofen, cimetidine, famotidine, ticlopidine, verapamil, zileuton  Birth control pills, blood pressure medicine, medicine for heart rhythm problems (such as amiodarone, mexiletine, propafenone), or medicine to treat an infection (such as levofloxacin, moxifloxacin)  Do not drink alcohol while you are using this medicine.  Tell your doctor if you use anything else that makes you sleepy. Some examples are allergy medicine, narcotic pain medicine, and alcohol.  Warnings While Using This Medicine:   Tell your doctor if you are pregnant or breastfeeding, or if you  have kidney disease or liver disease.  This medicine may cause the following problems:  Low blood pressure  Liver damage  This medicine may make you dizzy or drowsy. Do not drive or do anything else that could be dangerous until you know how this medicine affects you. Stand or sit up slowly if you are dizzy.  Do not stop using this medicine suddenly. Your doctor will need to slowly decrease your dose before you stop it completely.  Your doctor will do lab tests at regular visits to check on the effects of this medicine. Keep all appointments.  Keep all medicine out of the reach of children. Never share your medicine with anyone.  Possible Side Effects While Using This Medicine:   Call your doctor right away if you notice any of these side effects:  Allergic reaction: Itching or hives, swelling in your face or hands, swelling or tingling in your mouth or throat, chest tightness, trouble breathing  Dark urine or pale stools, nausea, vomiting, loss of appetite, stomach pain, yellow skin or eyes  Lightheadedness, dizziness, or fainting  Seeing or hearing things that are not really there  Slow heartbeat  If you notice these less serious side effects, talk with your doctor:   Dry mouth  Drowsiness or sleepiness  Weakness  If you notice other side effects that you think are caused by this medicine, tell your doctor.   Call your doctor for medical advice about side effects. You may report side effects to FDA at 8-073-FDA-3930  © Copyright [employer] 2023 Information is for End User's use only and may not be sold, redistributed or otherwise used for commercial purposes.  The above information is an  only. It is not intended as medical advice for individual conditions or treatments. Talk to your doctor, nurse or pharmacist before following any medical regimen to see if it is safe and effective for you.

## 2024-01-15 ENCOUNTER — OFFICE VISIT (OUTPATIENT)
Dept: FAMILY MEDICINE CLINIC | Facility: CLINIC | Age: 64
End: 2024-01-15
Payer: MEDICARE

## 2024-01-15 VITALS
WEIGHT: 150.2 LBS | DIASTOLIC BLOOD PRESSURE: 72 MMHG | SYSTOLIC BLOOD PRESSURE: 116 MMHG | BODY MASS INDEX: 27.64 KG/M2 | TEMPERATURE: 97.9 F | OXYGEN SATURATION: 99 % | RESPIRATION RATE: 17 BRPM | HEART RATE: 92 BPM | HEIGHT: 62 IN

## 2024-01-15 DIAGNOSIS — R19.00 FLANK MASS: Primary | ICD-10-CM

## 2024-01-15 DIAGNOSIS — E11.42 TYPE 2 DIABETES MELLITUS WITH DIABETIC POLYNEUROPATHY, WITHOUT LONG-TERM CURRENT USE OF INSULIN (HCC): ICD-10-CM

## 2024-01-15 DIAGNOSIS — D68.1 CONGENITAL FACTOR XI DEFICIENCY (HCC): ICD-10-CM

## 2024-01-15 DIAGNOSIS — M46.1 SACROILIITIS (HCC): ICD-10-CM

## 2024-01-15 DIAGNOSIS — D17.1 LIPOMA OF TORSO: ICD-10-CM

## 2024-01-15 PROCEDURE — 99213 OFFICE O/P EST LOW 20 MIN: CPT | Performed by: FAMILY MEDICINE

## 2024-01-15 NOTE — PROGRESS NOTES
"    Assessment/Plan:     Diagnoses and all orders for this visit:    Flank mass  -     US superficial lump (non extremity); Future    Sacroiliitis (HCC)    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HCC)    Type 2 diabetes mellitus with diabetic polyneuropathy, without long-term current use of insulin (HCC)    Lipoma of torso  -     Ambulatory Referral to General Surgery; Future     Will ultrasound mass  Masses consistent more with a lipoma  Question is is a pinching on any nerves that is causing her pain  Patient referred to general surgery as well        Subjective:     Chief Complaint   Patient presents with    Mass     Pt reports lump on her back that she noticed last week. Has been painful and swollen in that area since October.        Patient ID: Jada Hui is a 63 y.o. female.    Patient presents today for a mass on her left flank  She noticed it a few weeks ago  She is also having pain radiating around her low back into both sides of her body        The following portions of the patient's history were reviewed and updated as appropriate: allergies, current medications, past family history, past medical history, past social history, past surgical history and problem list.    Review of Systems   Constitutional: Negative.    HENT: Negative.     Eyes: Negative.    Respiratory: Negative.     Cardiovascular: Negative.    Gastrointestinal: Negative.    Endocrine: Negative.    Genitourinary: Negative.    Musculoskeletal:  Positive for back pain.   Skin: Negative.    Allergic/Immunologic: Negative.    Neurological: Negative.    Hematological: Negative.    Psychiatric/Behavioral: Negative.     All other systems reviewed and are negative.        Objective:    Vitals:    01/15/24 1451   BP: 116/72   BP Location: Left arm   Patient Position: Sitting   Cuff Size: Standard   Pulse: 92   Resp: 17   Temp: 97.9 °F (36.6 °C)   TempSrc: Tympanic   SpO2: 99%   Weight: 68.1 kg (150 lb 3.2 oz)   Height: 5' 2.25\" (1.581 m)          Physical " Exam  Vitals and nursing note reviewed.   Constitutional:       Appearance: Normal appearance. She is well-developed.   HENT:      Head: Normocephalic and atraumatic.      Right Ear: External ear normal.      Left Ear: External ear normal.   Eyes:      Conjunctiva/sclera: Conjunctivae normal.      Pupils: Pupils are equal, round, and reactive to light.   Cardiovascular:      Rate and Rhythm: Normal rate and regular rhythm.      Heart sounds: Normal heart sounds.   Pulmonary:      Effort: Pulmonary effort is normal.      Breath sounds: Normal breath sounds.   Abdominal:      General: Bowel sounds are normal.      Palpations: Abdomen is soft.   Musculoskeletal:         General: Normal range of motion.      Cervical back: Normal range of motion.   Skin:     General: Skin is warm and dry.   Neurological:      General: No focal deficit present.      Mental Status: She is alert and oriented to person, place, and time.      Deep Tendon Reflexes: Reflexes are normal and symmetric.   Psychiatric:         Behavior: Behavior normal.         Thought Content: Thought content normal.         Judgment: Judgment normal.

## 2024-01-22 DIAGNOSIS — E11.42 TYPE 2 DIABETES MELLITUS WITH DIABETIC POLYNEUROPATHY, WITHOUT LONG-TERM CURRENT USE OF INSULIN (HCC): ICD-10-CM

## 2024-01-22 RX ORDER — EMPAGLIFLOZIN AND LINAGLIPTIN 25; 5 MG/1; MG/1
TABLET, FILM COATED ORAL
Qty: 90 TABLET | Refills: 1 | Status: SHIPPED | OUTPATIENT
Start: 2024-01-22

## 2024-01-23 ENCOUNTER — HOSPITAL ENCOUNTER (OUTPATIENT)
Dept: ULTRASOUND IMAGING | Facility: CLINIC | Age: 64
Discharge: HOME | End: 2024-01-23
Payer: MEDICARE

## 2024-01-23 DIAGNOSIS — R19.00 FLANK MASS: ICD-10-CM

## 2024-01-23 PROCEDURE — 76705 ECHO EXAM OF ABDOMEN: CPT

## 2024-01-29 ENCOUNTER — OFFICE VISIT (OUTPATIENT)
Dept: SURGERY | Facility: HOSPITAL | Age: 64
End: 2024-01-29
Payer: MEDICARE

## 2024-01-29 VITALS
OXYGEN SATURATION: 97 % | DIASTOLIC BLOOD PRESSURE: 68 MMHG | BODY MASS INDEX: 27.57 KG/M2 | RESPIRATION RATE: 18 BRPM | HEART RATE: 96 BPM | HEIGHT: 62 IN | TEMPERATURE: 98.6 F | WEIGHT: 149.8 LBS | SYSTOLIC BLOOD PRESSURE: 105 MMHG

## 2024-01-29 DIAGNOSIS — R22.2 MASS OF SKIN OF BACK: Primary | ICD-10-CM

## 2024-01-29 DIAGNOSIS — D17.1 LIPOMA OF TORSO: ICD-10-CM

## 2024-01-29 PROCEDURE — 99203 OFFICE O/P NEW LOW 30 MIN: CPT | Performed by: SURGERY

## 2024-01-31 RX ORDER — VITAMIN B COMPLEX
1 CAPSULE ORAL DAILY
COMMUNITY

## 2024-01-31 NOTE — PRE-PROCEDURE INSTRUCTIONS
Pre-Surgery Instructions:   Medication Instructions    atorvastatin (LIPITOR) 40 mg tablet Take night before surgery    b complex vitamins capsule Stop taking 7 days prior to surgery.    BIOTIN BEAUTY EXTRA STRENGTH PO Stop taking 7 days prior to surgery.    Empagliflozin-linaGLIPtin (Glyxambi) 25-5 MG TABS Stop taking 4 days prior to surgery.    EPINEPHrine (EPIPEN) 0.3 mg/0.3 mL SOAJ Uses PRN- OK to take day of surgery    glimepiride (AMARYL) 4 mg tablet Hold day of surgery.    ibuprofen (MOTRIN) 800 mg tablet Stop taking 3 days prior to surgery.    lisinopril (ZESTRIL) 20 mg tablet Take night before surgery    metFORMIN (GLUCOPHAGE) 1000 MG tablet Hold day of surgery.    Multiple Vitamin (MULTIVITAMIN ADULT PO) Stop taking 7 days prior to surgery.    Omega-3 Fatty Acids (Fish Oil) 1200 MG CAPS Stop taking 7 days prior to surgery.    omeprazole (PriLOSEC) 40 MG capsule Take day of surgery.    Rhubarb (ESTROVEN MENOPAUSE RELIEF PO) Stop taking 7 days prior to surgery.    Symbicort 160-4.5 MCG/ACT inhaler Take day of surgery.    tiZANidine (ZANAFLEX) 4 mg tablet Take night before surgery    TURMERIC PO Stop taking 7 days prior to surgery.   Medication instructions for day surgery reviewed. Please use only a sip of water to take your instructed medications. Avoid all over the counter vitamins, supplements and NSAIDS for one week prior to surgery per anesthesia guidelines. Tylenol is ok to take as needed.     You will receive a call one business day prior to surgery with an arrival time and hospital directions. If your surgery is scheduled on a Monday, the hospital will be calling you on the Friday prior to your surgery. If you have not heard from anyone by 8pm, please call the hospital supervisor through the hospital  at [phone]. ([place] [phone]).    Do not eat or drink anything after midnight the night before your surgery, including candy, mints, lifesavers, or chewing gum. Do not drink alcohol  24hrs before your surgery. Try not to smoke at least 24hrs before your surgery.       Follow the pre surgery showering instructions as listed in the “My Surgical Experience Booklet” or otherwise provided by your surgeon's office. Do not use a blade to shave the surgical area 1 week before surgery. It is okay to use a clean electric clippers up to 24 hours before surgery. Do not apply any lotions, creams, including makeup, cologne, deodorant, or perfumes after showering on the day of your surgery. Do not use dry shampoo, hair spray, hair gel, or any type of hair products.     No contact lenses, eye make-up, or artificial eyelashes. Remove nail polish, including gel polish, and any artificial, gel, or acrylic nails if possible. Remove all jewelry including rings and body piercing jewelry.     Wear causal clothing that is easy to take on and off. Consider your type of surgery.    Keep any valuables, jewelry, piercings at home. Please bring any specially ordered equipment (sling, braces) if indicated.    Arrange for a responsible person to drive you to and from the hospital on the day of your surgery. Visitor Guidelines discussed.     Call the surgeon's office with any new illnesses, exposures, or additional questions prior to surgery.    Please reference your “My Surgical Experience Booklet” for additional information to prepare for your upcoming surgery.

## 2024-02-05 DIAGNOSIS — E78.5 HYPERLIPIDEMIA, UNSPECIFIED HYPERLIPIDEMIA TYPE: ICD-10-CM

## 2024-02-05 RX ORDER — ATORVASTATIN CALCIUM 40 MG/1
TABLET, FILM COATED ORAL
Qty: 90 TABLET | Refills: 0 | Status: SHIPPED | OUTPATIENT
Start: 2024-02-05

## 2024-02-07 ENCOUNTER — ANESTHESIA (OUTPATIENT)
Dept: PERIOP | Facility: HOSPITAL | Age: 64
End: 2024-02-07
Payer: MEDICARE

## 2024-02-07 ENCOUNTER — HOSPITAL ENCOUNTER (OUTPATIENT)
Facility: HOSPITAL | Age: 64
Setting detail: OUTPATIENT SURGERY
Discharge: HOME | End: 2024-02-07
Attending: SURGERY | Admitting: SURGERY
Payer: MEDICARE

## 2024-02-07 ENCOUNTER — ANESTHESIA EVENT (OUTPATIENT)
Dept: PERIOP | Facility: HOSPITAL | Age: 64
End: 2024-02-07
Payer: MEDICARE

## 2024-02-07 VITALS
DIASTOLIC BLOOD PRESSURE: 76 MMHG | TEMPERATURE: 98 F | HEIGHT: 62 IN | HEART RATE: 78 BPM | SYSTOLIC BLOOD PRESSURE: 137 MMHG | BODY MASS INDEX: 27.53 KG/M2 | OXYGEN SATURATION: 99 % | WEIGHT: 149.6 LBS | RESPIRATION RATE: 22 BRPM

## 2024-02-07 DIAGNOSIS — R22.2 MASS OF SKIN OF BACK: ICD-10-CM

## 2024-02-07 LAB
GLUCOSE SERPL-MCNC: 119 MG/DL (ref 65–140)
GLUCOSE SERPL-MCNC: 97 MG/DL (ref 65–140)

## 2024-02-07 PROCEDURE — 82948 REAGENT STRIP/BLOOD GLUCOSE: CPT

## 2024-02-07 PROCEDURE — 21931 EXC BACK LES SC 3 CM/>: CPT | Performed by: SURGERY

## 2024-02-07 PROCEDURE — 88304 TISSUE EXAM BY PATHOLOGIST: CPT | Performed by: SPECIALIST

## 2024-02-07 RX ORDER — ACETAMINOPHEN 325 MG/1
650 TABLET ORAL EVERY 6 HOURS PRN
Status: DISCONTINUED | OUTPATIENT
Start: 2024-02-07 | End: 2024-02-07 | Stop reason: HOSPADM

## 2024-02-07 RX ORDER — FENTANYL CITRATE/PF 50 MCG/ML
25 SYRINGE (ML) INJECTION
Status: DISCONTINUED | OUTPATIENT
Start: 2024-02-07 | End: 2024-02-07 | Stop reason: HOSPADM

## 2024-02-07 RX ORDER — HYDROMORPHONE HCL IN WATER/PF 6 MG/30 ML
0.2 PATIENT CONTROLLED ANALGESIA SYRINGE INTRAVENOUS
Status: DISCONTINUED | OUTPATIENT
Start: 2024-02-07 | End: 2024-02-07 | Stop reason: HOSPADM

## 2024-02-07 RX ORDER — MIDAZOLAM HYDROCHLORIDE 2 MG/2ML
INJECTION, SOLUTION INTRAMUSCULAR; INTRAVENOUS AS NEEDED
Status: DISCONTINUED | OUTPATIENT
Start: 2024-02-07 | End: 2024-02-07

## 2024-02-07 RX ORDER — SODIUM CHLORIDE, SODIUM LACTATE, POTASSIUM CHLORIDE, CALCIUM CHLORIDE 600; 310; 30; 20 MG/100ML; MG/100ML; MG/100ML; MG/100ML
INJECTION, SOLUTION INTRAVENOUS CONTINUOUS PRN
Status: DISCONTINUED | OUTPATIENT
Start: 2024-02-07 | End: 2024-02-07

## 2024-02-07 RX ORDER — LIDOCAINE HYDROCHLORIDE 10 MG/ML
INJECTION, SOLUTION EPIDURAL; INFILTRATION; INTRACAUDAL; PERINEURAL AS NEEDED
Status: DISCONTINUED | OUTPATIENT
Start: 2024-02-07 | End: 2024-02-07

## 2024-02-07 RX ORDER — CEFAZOLIN SODIUM 1 G/3ML
INJECTION, POWDER, FOR SOLUTION INTRAMUSCULAR; INTRAVENOUS AS NEEDED
Status: DISCONTINUED | OUTPATIENT
Start: 2024-02-07 | End: 2024-02-07

## 2024-02-07 RX ORDER — KETOROLAC TROMETHAMINE 30 MG/ML
INJECTION, SOLUTION INTRAMUSCULAR; INTRAVENOUS AS NEEDED
Status: DISCONTINUED | OUTPATIENT
Start: 2024-02-07 | End: 2024-02-07

## 2024-02-07 RX ORDER — DEXAMETHASONE SODIUM PHOSPHATE 10 MG/ML
INJECTION, SOLUTION INTRAMUSCULAR; INTRAVENOUS AS NEEDED
Status: DISCONTINUED | OUTPATIENT
Start: 2024-02-07 | End: 2024-02-07

## 2024-02-07 RX ORDER — MAGNESIUM HYDROXIDE 1200 MG/15ML
LIQUID ORAL AS NEEDED
Status: DISCONTINUED | OUTPATIENT
Start: 2024-02-07 | End: 2024-02-07 | Stop reason: HOSPADM

## 2024-02-07 RX ORDER — ONDANSETRON 2 MG/ML
4 INJECTION INTRAMUSCULAR; INTRAVENOUS EVERY 6 HOURS PRN
Status: DISCONTINUED | OUTPATIENT
Start: 2024-02-07 | End: 2024-02-07 | Stop reason: HOSPADM

## 2024-02-07 RX ORDER — ONDANSETRON 2 MG/ML
4 INJECTION INTRAMUSCULAR; INTRAVENOUS ONCE AS NEEDED
Status: DISCONTINUED | OUTPATIENT
Start: 2024-02-07 | End: 2024-02-07 | Stop reason: HOSPADM

## 2024-02-07 RX ORDER — LIDOCAINE HYDROCHLORIDE 10 MG/ML
0.5 INJECTION, SOLUTION EPIDURAL; INFILTRATION; INTRACAUDAL; PERINEURAL ONCE AS NEEDED
Status: DISCONTINUED | OUTPATIENT
Start: 2024-02-07 | End: 2024-02-07 | Stop reason: HOSPADM

## 2024-02-07 RX ORDER — ONDANSETRON 2 MG/ML
INJECTION INTRAMUSCULAR; INTRAVENOUS AS NEEDED
Status: DISCONTINUED | OUTPATIENT
Start: 2024-02-07 | End: 2024-02-07

## 2024-02-07 RX ORDER — SENNOSIDES 8.6 MG
1300 CAPSULE ORAL 2 TIMES DAILY
COMMUNITY

## 2024-02-07 RX ORDER — FENTANYL CITRATE 50 UG/ML
INJECTION, SOLUTION INTRAMUSCULAR; INTRAVENOUS AS NEEDED
Status: DISCONTINUED | OUTPATIENT
Start: 2024-02-07 | End: 2024-02-07

## 2024-02-07 RX ORDER — PROPOFOL 10 MG/ML
INJECTION, EMULSION INTRAVENOUS AS NEEDED
Status: DISCONTINUED | OUTPATIENT
Start: 2024-02-07 | End: 2024-02-07

## 2024-02-07 RX ADMIN — FENTANYL CITRATE 25 MCG: 50 INJECTION INTRAMUSCULAR; INTRAVENOUS at 13:25

## 2024-02-07 RX ADMIN — CEFAZOLIN 1000 MG: 1 INJECTION, POWDER, FOR SOLUTION INTRAMUSCULAR; INTRAVENOUS at 13:09

## 2024-02-07 RX ADMIN — ONDANSETRON 4 MG: 2 INJECTION INTRAMUSCULAR; INTRAVENOUS at 13:10

## 2024-02-07 RX ADMIN — PROPOFOL 150 MG: 10 INJECTION, EMULSION INTRAVENOUS at 13:13

## 2024-02-07 RX ADMIN — FENTANYL CITRATE 50 MCG: 50 INJECTION INTRAMUSCULAR; INTRAVENOUS at 13:50

## 2024-02-07 RX ADMIN — DEXAMETHASONE SODIUM PHOSPHATE 5 MG: 10 INJECTION, SOLUTION INTRAMUSCULAR; INTRAVENOUS at 13:18

## 2024-02-07 RX ADMIN — FENTANYL CITRATE 25 MCG: 50 INJECTION INTRAMUSCULAR; INTRAVENOUS at 13:20

## 2024-02-07 RX ADMIN — KETOROLAC TROMETHAMINE 15 MG: 30 INJECTION, SOLUTION INTRAMUSCULAR; INTRAVENOUS at 13:42

## 2024-02-07 RX ADMIN — MIDAZOLAM 2 MG: 1 INJECTION INTRAMUSCULAR; INTRAVENOUS at 13:06

## 2024-02-07 RX ADMIN — LIDOCAINE HYDROCHLORIDE 50 MG: 10 INJECTION, SOLUTION EPIDURAL; INFILTRATION; INTRACAUDAL; PERINEURAL at 13:13

## 2024-02-07 RX ADMIN — SODIUM CHLORIDE, SODIUM LACTATE, POTASSIUM CHLORIDE, AND CALCIUM CHLORIDE: .6; .31; .03; .02 INJECTION, SOLUTION INTRAVENOUS at 13:04

## 2024-02-07 NOTE — ANESTHESIA PREPROCEDURE EVALUATION
"Procedure:  EXCISION  BIOPSY LESION/MASS BACK (Back)    Relevant Problems   CARDIO   (+) Hyperlipidemia   (+) Hypertension   (+) Paroxysmal supraventricular tachycardia      ENDO   (+) Type 2 diabetes mellitus with diabetic polyneuropathy, without long-term current use of insulin (HCC)      HEMATOLOGY   (+) 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HCC)      MUSCULOSKELETAL   (+) Sacroiliitis (HCC)      PULMONARY   (+) Asthma     CAD/PCI/MI/CHF -- denies  COPD/ASTHMA/JOSE -- denies  PROBS WITH PRIOR ANESTHESIA -- denies  NPO STATUS CONFIRMED        Lab Results   Component Value Date    SODIUM 143 12/20/2023    K 4.2 12/20/2023    BUN 18 12/20/2023    CREATININE 0.72 12/20/2023    EGFR 93 12/20/2023     Lab Results   Component Value Date    HGBA1C 7.0 (H) 12/20/2023       Lab Results   Component Value Date    HGB 13.5 07/05/2023    HGB 12.8 05/18/2023    HGB 12.5 12/07/2022     07/05/2023     05/18/2023     12/07/2022     Lab Results   Component Value Date    WBC 10.18 (H) 07/05/2023       Lab Results   Component Value Date    CREATININE 0.72 12/20/2023    CREATININE 0.76 09/23/2023    CREATININE 0.67 07/05/2023       No results found for: \"INR\"  No results found for: \"PTT\"    No results found for: \"LACTATE\"                          Physical Exam    Airway    Mallampati score: II  TM Distance: >3 FB       Dental       Cardiovascular      Pulmonary      Other Findings  post-pubertal.      Anesthesia Plan  ASA Score- 2     Anesthesia Type- general with ASA Monitors.         Additional Monitors:     Airway Plan: LMA.    Comment:  I, Shane Land M.D., have personally seen and evaluated the patient prior to anesthetic care.  I have reviewed the pre-anesthetic record, and other medical records if appropriate to the anesthetic care.  If a CRNA is involved in the case, I have reviewed the CRNA assessment, if present, and agree.     MAC IF LATERAL POSITION AND IF SURGEON IN AGREEMENT;     Risks/benefits " and alternatives discussed with patient including possible PONV, sore throat, and possibility of rare anesthetic and surgical emergencies.  .       Plan Factors-    Chart reviewed.   Existing labs reviewed. Patient summary reviewed.      Patient instructed to abstain from smoking on day of procedure.     There is medical exclusion for perioperative obstructive sleep apnea risk education.    Intended use of anticholinesterase.    Induction- intravenous.    Postoperative Plan- Plan for postoperative opioid use. Planned trial extubation    Informed Consent- Anesthetic plan and risks discussed with patient.  I personally reviewed this patient with the CRNA. Discussed and agreed on the Anesthesia Plan with the CRNA..

## 2024-02-07 NOTE — DISCHARGE INSTR - AVS FIRST PAGE
[facility] [place]  Post-Operative Care Instructions  Dr. Ottoniel Lake MD, [facility]  [phone]    1. General: You will feel pulling sensations around the wound or funny aches and pains around the incisions. This is normal. Even minor surgery is a change in your body and this is your body's way of reacting to it. If you have had abdominal surgery, it may help to support the incision with a small pillow or blanket for comfort when moving or coughing.    2. Wound care:  Okay to shower.  The glue will fall off over the next week or 2.   Use ice for the first 5 days after surgery.  Do not use for longer than 20 minutes at a time. Use ice 5 times per day.    Limit overhead activities and activities with outstretched arms that pull on back incision.    3. Water: You may shower over the wounds. Do not bathe or use a pool or hot tub until cleared by the physician.   If you were discharged with a drain, make sure drain site is covered with plastic wrap before showering.    4. Activity: You may go up and down stairs, walk as much as you are comfortable, but walk at least 3 times each day. If you have had abdominal surgery, do not lift anything heavier than 15 lbs for the 1st 2 weeks and 25 lbs for weeks 3 and 4.     5. Diet: You may resume a regular diabetic diet. If you had a same-day surgery or overnight stay surgery, you may wish to eat lightly for a few days: soups, crackers, and sandwiches. You may resume a regular diet when ready.    6. Medications: Resume all of your previous medications, unless told otherwise by the doctor. Avoid aspirin products for 2-3 days after the date of surgery. You may, at that time, begin to take them again. Use Tylenol and Ibuprofen for pain control.  You may alternate these medications every 3 hours.  For example: you may take Tylenol at noon, Ibuprofen at 3:00 p.m., and Tylenol again at 6:00 p.m., etc.  You should use ice to assist with pain control as above.     7.  Driving: You will need someone to drive you home on the day of surgery or discharge. Do not drive or make any important decisions while on narcotic pain medication or 24 hours and after anesthesia or sedation for surgery. Generally, you may drive when your off all narcotic pain medications and you are comfortable.     8. Upset Stomach: You may take Maalox, Tums, or similar items for an upset stomach. If your narcotic pain medication causes an upset stomach, do not take it on an empty stomach. Try taking it with at least some crackers or toast.     9. Constipation: Patients often experience constipation after surgery. You may take over-the-counter medication for this, such as Metamucil, Senokot, Dulcolax, milk of magnesia, etc. You may take a suppository unless you have had anorectal surgery such as a procedure on your hemorrhoids. If you experience significant nausea or vomiting after abdominal surgery, call the office before trying any of these medications.    10. Call the office: If you are experiencing any of the following: fevers above 101.5°, significant nausea or vomiting, if the wound develops drainage and/or there is excessive redness around the wound, or if you have significant diarrhea or other worsening symptoms.    11. Pain: You may be given a prescription for pain medication.  This will be sent to your pharmacy prior to discharge.    12. Sexual Activity: You may resume sexual activity when you feel ready and comfortable and your incision is sealed and healed without apparent infection risk.    13. Urination: If you have not urinated in 6 hours, go directly to the ER for evaluation for urinary retention.     14. Follow-up in 2 weeks.

## 2024-02-07 NOTE — ANESTHESIA POSTPROCEDURE EVALUATION
Post-Op Assessment Note    CV Status:  Stable    Pain management: adequate       Mental Status:  Awake and sleepy   Hydration Status:  Euvolemic   PONV Controlled:  Controlled   Airway Patency:  Patent     Post Op Vitals Reviewed: Yes    No anethesia notable event occurred.    Staff: LYN               /73 (02/07/24 1354)    Temp 98 °F (36.7 °C) (02/07/24 1354)    Pulse 77 (02/07/24 1354)   Resp 16 (02/07/24 1354)    SpO2 99 % (02/07/24 1354)

## 2024-02-07 NOTE — INTERIM OP NOTE
EXCISION MASS BACK  Postoperative Note  PATIENT NAME: Jada Hui  : 1960  MRN: [id number]  [facility] OR ROOM 02    Surgery Date: 2024    Preop Diagnosis:  Mass of skin of back [R22.2]    Post-Op Diagnosis Codes:     * Mass of skin of back [R22.2]    Procedure(s) (LRB):  EXCISION MASS BACK (N/A)    Surgeons and Role:     * Ottoniel Lake MD - Primary     * Renee Hair PA-C - Assisting    Specimens:  ID Type Source Tests Collected by Time Destination   1 : Back mass 3x3x3 Tissue Mass TISSUE EXAM Ottoniel Lake MD 2024 1333        Estimated Blood Loss:   Minimal    Anesthesia Type:   IV Sedation with Anesthesia     Findings:   Fatty tissue mass 3 x 3 x 3 cm  Complications:   None      SIGNATURE: Renee Hair PA-C   DATE: 2024   TIME: 2:28 PM

## 2024-02-07 NOTE — OP NOTE
Excision soft tissue /skin lesion Procedure Note    Name: Jada Hui   : 1960  MRN: [id number]  Date: 2024    Indications: The patient has a soft tissue or skin lesion requiring excision    Pre-operative Diagnosis: Back mass  Post-operative Diagnosis: Same    Procedure: Resection of back lipomas   Surgeon: Ottoniel Lake MD  Assistants: Linh Hair PA-C    Procedure Details   The patient was seen in the Holding Room. The risks, benefits, complications, treatment options, and expected outcomes were discussed with the patient. The possibilities of reaction to medication, bleeding, infection, the need for additional procedures, failure to diagnose a condition, and creating a complication operation were discussed with the patient. The patient concurred with the proposed plan, giving informed consent.  The site of surgery properly noted/marked. The patient was taken to Operating Room, identified as Jada Hui and staff verified the patient name, , site, and laterality, if applicable.     A Time Out was held and the above information confirmed.    The patient was placed lying supine.  The  back  was prepped and draped in standard fashion.  Local anesthesia was used to anesthetize the skin surrounding a 3 cm lesion.  An oblique incision was made over the lesion.  Sharp and blunt dissection were used to mobilize the mass which was in a subcutaneous location.  Hemostasis was achieved with cautery. Scissors, knife, and cautery were used in the excision. 5mm  margins were taken around the lesion.  Closure was achieved a with layered closure utilizing a 3-0 Vicryl subcutaneous layer and a  4-0 Monocryl subcuticular stitch.  Histacryl was applied and the wound was dressed. At the end of the operation, all sponge, instrument, and needle counts were correct.      This text is generated with voice recognition software. There may be translation, syntax,  or grammatical errors. If you have any questions, please  contact the dictating provider.       Findings:  Size: 3 x3x3cm  Margins:5 mm    Estimated Blood Loss:  Minimal                      Specimens: All specimens sent to pathology.   Order Name Source Comment Collection Info Order Time   TISSUE EXAM Mass Back mass 3x3x3 Collected By: Ottoniel Lake MD 2/7/2024  1:33 PM     Release to patient through [facility]   Immediate                           Complications:  None; patient tolerated the procedure well.           Disposition: PACU     Condition: stable    Attending Attestation: I was present for the entire procedure    Signature:   Ottoniel Lake MD  Date: 2/7/2024 Time: 2:13 PM

## 2024-02-09 PROCEDURE — 88304 TISSUE EXAM BY PATHOLOGIST: CPT | Performed by: SPECIALIST

## 2024-02-12 RX ORDER — SODIUM CHLORIDE, SODIUM LACTATE, POTASSIUM CHLORIDE, CALCIUM CHLORIDE 600; 310; 30; 20 MG/100ML; MG/100ML; MG/100ML; MG/100ML
125 INJECTION, SOLUTION INTRAVENOUS CONTINUOUS
OUTPATIENT
Start: 2024-02-12

## 2024-02-12 RX ORDER — CEFAZOLIN SODIUM 1 G/50ML
1000 SOLUTION INTRAVENOUS ONCE
OUTPATIENT
Start: 2024-02-12 | End: 2024-02-12

## 2024-02-12 NOTE — PROGRESS NOTES
Assessment/Plan:   Jada Hui is a 63 y.o.female who is here for Consult (NP- Consult- Lipoma of back left side //Pt presents for a lipoma of the back. Pt states it is painful. It has previously shown signs of infection. Pt noticed approx 1 month ago. )  .    Plan: Back mass -  discussed operative vs conservative mgt, surgical approaches, risks and benefits and patient understands that her pain may not resolve after surgery. Pt understands and wishes to proceed with Excision of back mass    Preoperative Clearance: None    HPI:  Jada Hui is a 63 y.o.female who was referred for evaluation of Consult (NP- Consult- Lipoma of back left side //Pt presents for a lipoma of the back. Pt states it is painful. It has previously shown signs of infection. Pt noticed approx 1 month ago. )  .    Currently back mass.     ROS:  General ROS: negative  negative for - chills, fatigue, fever or night sweats, weight loss  Respiratory ROS: no cough, shortness of breath, or wheezing  Cardiovascular ROS: no chest pain or dyspnea on exertion  Genito-Urinary ROS: no dysuria, trouble voiding, or hematuria  Musculoskeletal ROS: negative for - gait disturbance, joint pain or muscle pain  Neurological ROS: no TIA or stroke symptoms  Back mass    [unfilled]  Shellfish-derivedproducts [shellfish-derived products - food allergy], Phenytoin, and Shellfish allergy - food allergy    Current Outpatient Medications:     Alcohol Swabs (Alcohol Prep) PADS, Use in the morning, Disp: 100 each, Rfl: 3    Blood Glucose Monitoring Suppl (ONE TOUCH ULTRA 2) w/Device KIT, Test once daily, Disp: 1 kit, Rfl: 0    Empagliflozin-linaGLIPtin (Glyxambi) 25-5 MG TABS, Take 1 tab daily, Disp: 90 tablet, Rfl: 1    glimepiride (AMARYL) 4 mg tablet, 1 tab bid., Disp: 180 tablet, Rfl: 1    glucose blood (OneTouch Ultra) test strip, Test once daily, Disp: 100 each, Rfl: 3    ibuprofen (MOTRIN) 800 mg tablet, As needed, Disp: , Rfl:     Lancets (onetouch ultrasoft) lancets,  Test once daily, Disp: 100 each, Rfl: 3    lisinopril (ZESTRIL) 20 mg tablet, TAKE 1 TABLET DAILY, Disp: 90 tablet, Rfl: 1    metFORMIN (GLUCOPHAGE) 1000 MG tablet, Take 1 tablet (1,000 mg total) by mouth 2 (two) times a day with meals, Disp: 180 tablet, Rfl: 3    Multiple Vitamin (MULTIVITAMIN ADULT PO), Take 1 tablet by mouth daily, Disp: , Rfl:     Omega-3 Fatty Acids (Fish Oil) 1200 MG CAPS, , Disp: , Rfl:     omeprazole (PriLOSEC) 40 MG capsule, TAKE 1 CAPSULE DAILY, Disp: 90 capsule, Rfl: 1    Rhubarb (ESTROVEN MENOPAUSE RELIEF PO), Take 1 tablet by mouth daily, Disp: , Rfl:     Symbicort 160-4.5 MCG/ACT inhaler, INHALE 2 PUFFS BY MOUTH 2 TIMES A DAY RINSE MOUTH AFTER USE., Disp: 30.6 g, Rfl: 2    tiZANidine (ZANAFLEX) 4 mg tablet, Take 1 tablet (4 mg total) by mouth daily at bedtime, Disp: 30 tablet, Rfl: 2    TURMERIC PO, Take 500 mg by mouth daily, Disp: , Rfl:     acetaminophen (TYLENOL) 650 mg CR tablet, Take 1,300 mg by mouth 2 (two) times a day, Disp: , Rfl:     atorvastatin (LIPITOR) 40 mg tablet, TAKE 1 TABLET DAILY, Disp: 90 tablet, Rfl: 0    b complex vitamins capsule, Take 1 capsule by mouth daily, Disp: , Rfl:     BIOTIN BEAUTY EXTRA STRENGTH PO, Take by mouth, Disp: , Rfl:     EPINEPHrine (EPIPEN) 0.3 mg/0.3 mL SOAJ, Inject 0.3 mL (0.3 mg total) into a muscle once for 1 dose, Disp: 0.6 mL, Rfl: 0  Past Medical History:   Diagnosis Date    Anemia 2008    Due to heavy menstruation    Arthritis     Asthma 2005    Seasonal    Diabetes mellitus (HCC) 2006    GERD (gastroesophageal reflux disease) 2008    Could be due to gastroparesis    Hyperlipidemia     Hypertension 2005    Pneumonia 2018    Walking pneumonia    TIA (transient ischemic attack)      Past Surgical History:   Procedure Laterality Date    ADENOIDECTOMY  1965    BACK SURGERY      cervical and lumbar    CHOLECYSTECTOMY  2006    COLONOSCOPY      SC BIOPSY SOFT TISSUE BACK/FLANK DEEP N/A 2/7/2024    Procedure: EXCISION MASS BACK;  Surgeon:  Ottoniel Lake MD;  Location: [facility] OR;  Service: General    SPINE SURGERY  ,  and     ACDF 2018, Laminectomy  and fusion     TONSILLECTOMY  1965    TUBAL LIGATION       Family History   Problem Relation Age of Onset    Cancer Mother         Unknown    Clotting disorder Mother         Factor XI deficiency    Coronary artery disease Father          at 52 years    Diabetes Father     Hypertension Father     Diabetes type II Father     Breast cancer Sister         50's    Cancer Sister         Breast cancer twice    COPD Sister     No Known Problems Daughter     No Known Problems Daughter     No Known Problems Daughter     No Known Problems Daughter     No Known Problems Maternal Grandmother     No Known Problems Maternal Grandfather     No Known Problems Paternal Grandmother     No Known Problems Paternal Grandfather     No Known Problems Maternal Aunt     Breast cancer Paternal Aunt         60's    Cancer Paternal Aunt         Breast cancer    Diabetes Paternal Aunt     Hypertension Paternal Aunt     Coronary artery disease Brother         Congestive Heart Failure    Heart failure Brother     Hypertension Brother     Heart failure Paternal Uncle     Hypertension Paternal Uncle     Heart failure Paternal Uncle     Hypertension Paternal Uncle     Diabetes type II Family         Daughter    Hypothyroidism Family         Daughter      reports that she quit smoking about 39 years ago. Her smoking use included cigarettes. She started smoking about 41 years ago. She has a 1.0 pack-year smoking history. She has never used smokeless tobacco. She reports current alcohol use. She reports that she does not use drugs.    Labs:   Lab Results   Component Value Date    WBC 10.18 (H) 2023    WBC 6.9 2023    WBC 8.36 2022    WBC 6.9 2022    HGB 13.5 2023    HGB 12.8 2023    HGB 12.5 2022    HGB 12.5 2022     2023     2023    PLT  "261 12/07/2022     09/21/2022     Lab Results   Component Value Date    ALT 37 09/23/2023    ALT 28 07/05/2023    ALT 31 05/18/2023    ALT 39 12/07/2022    ALT 24 09/21/2022    AST 29 09/23/2023    AST 19 07/05/2023    AST 26 05/18/2023    AST 22 12/07/2022    AST 24 09/21/2022     This SmartLink has not been configured with any valid records.       PHYSICAL EXAM  General Appearance:    Alert, cooperative, no distress,    Head:    Normocephalic without obvious abnormality   Eyes:    PERRL, conjunctiva/corneas clear, EOM's intact        Neck:   Supple, no adenopathy, no JVD   Back:     Symmetric, no spinal or CVA tenderness   Lungs:     Clear to auscultation bilaterally, no wheezing or rhonchi   Heart:    Regular rate and rhythm, S1 and S2 normal, no murmur   Abdomen:     Soft +BS ND NT   Extremities:   Extremities normal. No clubbing, cyanosis or edema   Psych:   Normal Affect, AOx3.    Neurologic:  Skin:   CNII-XII intact. Strength symmetric, speech intact    Warm, dry, intact, back mass 4x3cm         Physical Exam              Some portions of this record may have been generated with voice recognition software. There may be translation, syntax,  or grammatical errors. Occasional wrong word or \"sound-a-like\" substitutions may have occurred due to the inherent limitations of the voice recognition software. Read the chart carefully and recognize, using context, where substitutions may have occurred. If you have any questions, please contact the dictating provider for clarification or correction, as needed. This encounter has been coded by a non-certified coder.   "

## 2024-02-19 ENCOUNTER — OFFICE VISIT (OUTPATIENT)
Dept: SURGERY | Facility: HOSPITAL | Age: 64
End: 2024-02-19

## 2024-02-19 VITALS
TEMPERATURE: 98 F | SYSTOLIC BLOOD PRESSURE: 137 MMHG | BODY MASS INDEX: 27.58 KG/M2 | OXYGEN SATURATION: 97 % | HEIGHT: 62 IN | HEART RATE: 78 BPM | DIASTOLIC BLOOD PRESSURE: 76 MMHG | WEIGHT: 149.9 LBS | RESPIRATION RATE: 18 BRPM

## 2024-02-19 DIAGNOSIS — R22.2 MASS OF SKIN OF BACK: Primary | ICD-10-CM

## 2024-02-19 PROCEDURE — 99024 POSTOP FOLLOW-UP VISIT: CPT | Performed by: PHYSICIAN ASSISTANT

## 2024-02-19 NOTE — PROGRESS NOTES
Assessment/Plan:   Jada Hui is a 63 y.o.female who comes in today for postoperative check after excision of soft tissue mass of left back done in the operating room on 2/7/2024.    Patient is feeling well.  Does admit to some tenderness at site.  Initially noticed some swelling.  No bruising or drainage from site.  No fevers or chills.  Has been avoiding activities that involve pulling at incision site.    On exam incision site is well-healed.  Surrounding tissue is soft.  No erythema or induration.  No underlying fluctuance or ecchymosis.  Surgical glue remains in site.     Pathology: Mass, Back mass 3x3x3:  - Fragments of lipoma    Discussed pathology is benign.  Lipomas of back are often lobulated likely related to being excised in fragments.    At this point incision is well-healed.  She may remove remaining glue as it lifts from skin edges.  She may however site or soak in water.  She may return to normal activities.  She should continue to call with any changes questions or concerns.        HPI:  Jada Hui is a 63 y.o.female who comes in today for postoperative check after recent excision of back lipoma as above.    Currently doing well without problems, no fever or chills.    Still some tenderness at incision site.  Initially noted some swelling but has improved.  Any bruising or drainage from site.      ROS:  General ROS: negative for - chills, fatigue, fever or night sweats, weight loss  Respiratory ROS: no cough, shortness of breath, or wheezing  Cardiovascular ROS: no chest pain or dyspnea on exertion  Abdomen ROS: no pain, N/V  Genito-Urinary ROS: no dysuria, trouble voiding, or hematuria  Musculoskeletal ROS: negative for - gait disturbance, joint pain or muscle pain  Neurological ROS: no TIA or stroke symptoms  Skin ROS: as per HPI    ALLERGIES  Shellfish-derivedproducts [shellfish-derived products - food allergy], Phenytoin, and Shellfish allergy - food allergy    Current Outpatient Medications:      acetaminophen (TYLENOL) 650 mg CR tablet, Take 1,300 mg by mouth 2 (two) times a day, Disp: , Rfl:     Alcohol Swabs (Alcohol Prep) PADS, Use in the morning, Disp: 100 each, Rfl: 3    atorvastatin (LIPITOR) 40 mg tablet, TAKE 1 TABLET DAILY, Disp: 90 tablet, Rfl: 0    b complex vitamins capsule, Take 1 capsule by mouth daily, Disp: , Rfl:     BIOTIN BEAUTY EXTRA STRENGTH PO, Take by mouth, Disp: , Rfl:     Blood Glucose Monitoring Suppl (ONE TOUCH ULTRA 2) w/Device KIT, Test once daily, Disp: 1 kit, Rfl: 0    Empagliflozin-linaGLIPtin (Glyxambi) 25-5 MG TABS, Take 1 tab daily, Disp: 90 tablet, Rfl: 1    glimepiride (AMARYL) 4 mg tablet, 1 tab bid., Disp: 180 tablet, Rfl: 1    glucose blood (OneTouch Ultra) test strip, Test once daily, Disp: 100 each, Rfl: 3    ibuprofen (MOTRIN) 800 mg tablet, As needed, Disp: , Rfl:     Lancets (onetouch ultrasoft) lancets, Test once daily, Disp: 100 each, Rfl: 3    lisinopril (ZESTRIL) 20 mg tablet, TAKE 1 TABLET DAILY, Disp: 90 tablet, Rfl: 1    metFORMIN (GLUCOPHAGE) 1000 MG tablet, Take 1 tablet (1,000 mg total) by mouth 2 (two) times a day with meals, Disp: 180 tablet, Rfl: 3    Multiple Vitamin (MULTIVITAMIN ADULT PO), Take 1 tablet by mouth daily, Disp: , Rfl:     Omega-3 Fatty Acids (Fish Oil) 1200 MG CAPS, , Disp: , Rfl:     omeprazole (PriLOSEC) 40 MG capsule, TAKE 1 CAPSULE DAILY, Disp: 90 capsule, Rfl: 1    Rhubarb (ESTROVEN MENOPAUSE RELIEF PO), Take 1 tablet by mouth daily, Disp: , Rfl:     Symbicort 160-4.5 MCG/ACT inhaler, INHALE 2 PUFFS BY MOUTH 2 TIMES A DAY RINSE MOUTH AFTER USE., Disp: 30.6 g, Rfl: 2    tiZANidine (ZANAFLEX) 4 mg tablet, Take 1 tablet (4 mg total) by mouth daily at bedtime, Disp: 30 tablet, Rfl: 2    TURMERIC PO, Take 500 mg by mouth daily, Disp: , Rfl:     EPINEPHrine (EPIPEN) 0.3 mg/0.3 mL SOAJ, Inject 0.3 mL (0.3 mg total) into a muscle once for 1 dose, Disp: 0.6 mL, Rfl: 0  Past Medical History:   Diagnosis Date    Anemia 2008    Due to  heavy menstruation    Arthritis     Asthma 2005    Seasonal    Diabetes mellitus (HCC) 2006    GERD (gastroesophageal reflux disease) 2008    Could be due to gastroparesis    Hyperlipidemia     Hypertension 2005    Pneumonia 2018    Walking pneumonia    TIA (transient ischemic attack)      Past Surgical History:   Procedure Laterality Date    ADENOIDECTOMY  1965    BACK SURGERY      cervical and lumbar    CHOLECYSTECTOMY  2006    COLONOSCOPY      ME BIOPSY SOFT TISSUE BACK/FLANK DEEP N/A 2024    Procedure: EXCISION MASS BACK;  Surgeon: Ottoniel Lake MD;  Location:  MAIN OR;  Service: General    SPINE SURGERY  ,  and     ACDF , Laminectomy  and fusion     TONSILLECTOMY  1965    TUBAL LIGATION       Family History   Problem Relation Age of Onset    Cancer Mother         Unknown    Clotting disorder Mother         Factor XI deficiency    Coronary artery disease Father          at 52 years    Diabetes Father     Hypertension Father     Diabetes type II Father     Breast cancer Sister         50's    Cancer Sister         Breast cancer twice    COPD Sister     No Known Problems Daughter     No Known Problems Daughter     No Known Problems Daughter     No Known Problems Daughter     No Known Problems Maternal Grandmother     No Known Problems Maternal Grandfather     No Known Problems Paternal Grandmother     No Known Problems Paternal Grandfather     No Known Problems Maternal Aunt     Breast cancer Paternal Aunt         60's    Cancer Paternal Aunt         Breast cancer    Diabetes Paternal Aunt     Hypertension Paternal Aunt     Coronary artery disease Brother         Congestive Heart Failure    Heart failure Brother     Hypertension Brother     Heart failure Paternal Uncle     Hypertension Paternal Uncle     Heart failure Paternal Uncle     Hypertension Paternal Uncle     Diabetes type II Family         Daughter    Hypothyroidism Family         Daughter      reports that she  quit smoking about 39 years ago. Her smoking use included cigarettes. She started smoking about 41 years ago. She has a 1 pack-year smoking history. She has never used smokeless tobacco. She reports current alcohol use. She reports that she does not use drugs.    PHYSICAL EXAM    Vitals:    02/19/24 1051   BP: 137/76   Pulse: 78   Resp: 18   Temp: 98 °F (36.7 °C)   SpO2: 97%       General: normal, cooperative, no distress  Incision: clean, dry, and intact and healing well  No ecchymosis, erythema, induration, fluctuance, drainage, or signs of infection.      Renee Hair

## 2024-03-01 ENCOUNTER — OFFICE VISIT (OUTPATIENT)
Dept: PAIN MEDICINE | Facility: CLINIC | Age: 64
End: 2024-03-01
Payer: MEDICARE

## 2024-03-01 VITALS
SYSTOLIC BLOOD PRESSURE: 122 MMHG | TEMPERATURE: 97.8 F | DIASTOLIC BLOOD PRESSURE: 76 MMHG | BODY MASS INDEX: 27.6 KG/M2 | HEART RATE: 84 BPM | WEIGHT: 150 LBS | HEIGHT: 62 IN

## 2024-03-01 DIAGNOSIS — M54.81 OCCIPITAL NEURALGIA OF LEFT SIDE: ICD-10-CM

## 2024-03-01 DIAGNOSIS — M96.1 CERVICAL POST-LAMINECTOMY SYNDROME: ICD-10-CM

## 2024-03-01 DIAGNOSIS — M79.18 MYOFASCIAL PAIN SYNDROME: ICD-10-CM

## 2024-03-01 DIAGNOSIS — M46.1 SACROILIITIS (HCC): Primary | ICD-10-CM

## 2024-03-01 DIAGNOSIS — M47.812 CERVICAL SPONDYLOSIS: ICD-10-CM

## 2024-03-01 DIAGNOSIS — M96.1 LUMBAR POSTLAMINECTOMY SYNDROME: ICD-10-CM

## 2024-03-01 PROCEDURE — 99214 OFFICE O/P EST MOD 30 MIN: CPT | Performed by: PHYSICIAN ASSISTANT

## 2024-03-01 RX ORDER — TIZANIDINE 4 MG/1
4 TABLET ORAL
Qty: 30 TABLET | Refills: 2 | Status: SHIPPED | OUTPATIENT
Start: 2024-03-01

## 2024-03-01 NOTE — PROGRESS NOTES
Assessment:  1. Sacroiliitis (HCC)    2. Lumbar postlaminectomy syndrome    3. Myofascial pain syndrome    4. Cervical spondylosis    5. Occipital neuralgia of left side    6. Cervical post-laminectomy syndrome        Plan:  While the patient was in the office today, I did have a thorough conversation regarding their chronic pain syndrome, medication management, and treatment plan options.    Based on patient report and physical exam, the patient's symptomatology does seem to be consistent with sacroiliac mediated pain from sacroiliitis. We will schedule the patient for a bilateral SIJ injection to decrease any inflammatory component of the patient's pain symptoms.  The patient was made aware that her blood sugars may elevate after the injection.  Her last hemoglobin A1c was 7.0    Consider left greater occipital nerve block if the occipital neuralgia headaches become more frequent and more severe.    Continue physical therapy as tolerated.    Refill tizanidine 4 mg nightly as needed    The patient was advised to contact the office should their symptoms worsen in the interim. The patient was agreeable and verbalized an understanding.        History of Present Illness:    The patient is a 63 y.o. female last seen on 1/9/2024 who presents for a follow up office visit in regards to chronic neck and low back pain secondary to spondylosis and postlaminectomy pain syndrome.  The patient currently reports persistent left-sided neck pain and bilateral low back pain that she presently rates a 5 out of 10 and describes these areas as constant and sharp and shooting in nature.  She feels that the tightness in her left neck and trapezius area has lessened with physical therapy however she is reporting a slight increase in left-sided occipital neuralgia headache patterns.  She denies any upper extremity radicular involvement.  She states her bigger concern is increasing low back pain with referred pain patterns in the bilateral  hips and buttocks.  Patient has a history of good relief with sacroiliac joint injections in the past, last injection was done in June 2023 and she reported greater than 50% relief up until recently.  She continues to be active in physical therapy and does find it helpful    I have personally reviewed and/or updated the patient's past medical history, past surgical history, family history, social history, current medications, allergies, and vital signs today.       Review of Systems:    Review of Systems   Respiratory:  Negative for shortness of breath.    Cardiovascular:  Negative for chest pain.   Gastrointestinal:  Negative for constipation, diarrhea, nausea and vomiting.   Musculoskeletal:  Positive for gait problem. Negative for arthralgias, joint swelling (Joint stiffness) and myalgias.   Skin:  Negative for rash.   Neurological:  Positive for weakness. Negative for dizziness and seizures.   All other systems reviewed and are negative.        Past Medical History:   Diagnosis Date   • Anemia 2008    Due to heavy menstruation   • Arthritis    • Asthma 2005    Seasonal   • Diabetes mellitus (HCC) 2006   • GERD (gastroesophageal reflux disease) 2008    Could be due to gastroparesis   • Hyperlipidemia    • Hypertension 2005   • Pneumonia 2018    Walking pneumonia   • TIA (transient ischemic attack)        Past Surgical History:   Procedure Laterality Date   • ADENOIDECTOMY  1965   • BACK SURGERY      cervical and lumbar   • CHOLECYSTECTOMY  2006   • COLONOSCOPY     • OR BIOPSY SOFT TISSUE BACK/FLANK DEEP N/A 2/7/2024    Procedure: EXCISION MASS BACK;  Surgeon: Ottoniel Lake MD;  Location:  MAIN OR;  Service: General   • SPINE SURGERY  2018, 2019 and 2021    ACDF 2018, Laminectomy 2019 and fusion 2021   • TONSILLECTOMY  1965   • TUBAL LIGATION         Family History   Problem Relation Age of Onset   • Cancer Mother         Unknown   • Clotting disorder Mother         Factor XI deficiency   • Coronary artery  disease Father          at 52 years   • Diabetes Father    • Hypertension Father    • Diabetes type II Father    • Breast cancer Sister         50's   • Cancer Sister         Breast cancer twice   • COPD Sister    • No Known Problems Daughter    • No Known Problems Daughter    • No Known Problems Daughter    • No Known Problems Daughter    • No Known Problems Maternal Grandmother    • No Known Problems Maternal Grandfather    • No Known Problems Paternal Grandmother    • No Known Problems Paternal Grandfather    • No Known Problems Maternal Aunt    • Breast cancer Paternal Aunt         60's   • Cancer Paternal Aunt         Breast cancer   • Diabetes Paternal Aunt    • Hypertension Paternal Aunt    • Coronary artery disease Brother         Congestive Heart Failure   • Heart failure Brother    • Hypertension Brother    • Heart failure Paternal Uncle    • Hypertension Paternal Uncle    • Heart failure Paternal Uncle    • Hypertension Paternal Uncle    • Diabetes type II Family         Daughter   • Hypothyroidism Family         Daughter       Social History     Occupational History   • Not on file   Tobacco Use   • Smoking status: Former     Current packs/day: 0.00     Average packs/day: 0.5 packs/day for 2.0 years (1.0 ttl pk-yrs)     Types: Cigarettes     Start date: 1983     Quit date: 1985     Years since quittin.1   • Smokeless tobacco: Never   • Tobacco comments:      years ago   Vaping Use   • Vaping status: Never Used   Substance and Sexual Activity   • Alcohol use: Yes     Comment: Occasional   • Drug use: Never   • Sexual activity: Not Currently     Partners: Male     Birth control/protection: Post-menopausal, Female Sterilization         Current Outpatient Medications:   •  acetaminophen (TYLENOL) 650 mg CR tablet, Take 1,300 mg by mouth 2 (two) times a day, Disp: , Rfl:   •  atorvastatin (LIPITOR) 40 mg tablet, TAKE 1 TABLET DAILY, Disp: 90 tablet, Rfl: 0  •  b complex vitamins capsule,  Take 1 capsule by mouth daily, Disp: , Rfl:   •  BIOTIN BEAUTY EXTRA STRENGTH PO, Take by mouth, Disp: , Rfl:   •  Empagliflozin-linaGLIPtin (Glyxambi) 25-5 MG TABS, Take 1 tab daily, Disp: 90 tablet, Rfl: 1  •  glimepiride (AMARYL) 4 mg tablet, 1 tab bid., Disp: 180 tablet, Rfl: 1  •  ibuprofen (MOTRIN) 800 mg tablet, As needed, Disp: , Rfl:   •  lisinopril (ZESTRIL) 20 mg tablet, TAKE 1 TABLET DAILY, Disp: 90 tablet, Rfl: 1  •  metFORMIN (GLUCOPHAGE) 1000 MG tablet, Take 1 tablet (1,000 mg total) by mouth 2 (two) times a day with meals, Disp: 180 tablet, Rfl: 3  •  Multiple Vitamin (MULTIVITAMIN ADULT PO), Take 1 tablet by mouth daily, Disp: , Rfl:   •  Omega-3 Fatty Acids (Fish Oil) 1200 MG CAPS, , Disp: , Rfl:   •  omeprazole (PriLOSEC) 40 MG capsule, TAKE 1 CAPSULE DAILY, Disp: 90 capsule, Rfl: 1  •  Rhubarb (ESTROVEN MENOPAUSE RELIEF PO), Take 1 tablet by mouth daily, Disp: , Rfl:   •  Symbicort 160-4.5 MCG/ACT inhaler, INHALE 2 PUFFS BY MOUTH 2 TIMES A DAY RINSE MOUTH AFTER USE., Disp: 30.6 g, Rfl: 2  •  tiZANidine (ZANAFLEX) 4 mg tablet, Take 1 tablet (4 mg total) by mouth daily at bedtime, Disp: 30 tablet, Rfl: 2  •  TURMERIC PO, Take 500 mg by mouth daily, Disp: , Rfl:   •  Alcohol Swabs (Alcohol Prep) PADS, Use in the morning, Disp: 100 each, Rfl: 3  •  Blood Glucose Monitoring Suppl (ONE TOUCH ULTRA 2) w/Device KIT, Test once daily, Disp: 1 kit, Rfl: 0  •  EPINEPHrine (EPIPEN) 0.3 mg/0.3 mL SOAJ, Inject 0.3 mL (0.3 mg total) into a muscle once for 1 dose, Disp: 0.6 mL, Rfl: 0  •  glucose blood (OneTouch Ultra) test strip, Test once daily, Disp: 100 each, Rfl: 3  •  Lancets (onetouch ultrasoft) lancets, Test once daily, Disp: 100 each, Rfl: 3    Allergies   Allergen Reactions   • Shellfish-Derivedproducts [Shellfish-Derived Products - Food Allergy] Anaphylaxis   • Phenytoin Hives   • Shellfish Allergy - Food Allergy Other (See Comments)       Physical Exam:    /76 (BP Location: Left arm, Patient  "Position: Sitting, Cuff Size: Standard)   Pulse 84   Temp 97.8 °F (36.6 °C)   Ht 5' 2.25\" (1.581 m)   Wt 68 kg (150 lb)   BMI 27.22 kg/m²     Constitutional:normal, well developed, well nourished, alert, in no distress and non-toxic and no overt pain behavior.  Eyes:anicteric  HEENT:grossly intact  Neck:supple, symmetric, trachea midline and no masses   Pulmonary:even and unlabored  Cardiovascular:No edema or pitting edema present  Skin:Normal without rashes or lesions and well hydrated  Psychiatric:Mood and affect appropriate  Neurologic:Cranial Nerves II-XII grossly intact  Musculoskeletal: Tender to palpation over the left suboccipital region, bilateral sacroiliac joint tenderness bilateral + Melisa finger sign + Patricks test +Gaenslen's test+ Posterior pelvic pain provocation      Imaging  FL spine and pain procedure    (Results Pending)         Orders Placed This Encounter   Procedures   • FL spine and pain procedure       "

## 2024-03-14 ENCOUNTER — HOSPITAL ENCOUNTER (OUTPATIENT)
Dept: RADIOLOGY | Facility: CLINIC | Age: 64
Discharge: HOME | End: 2024-03-14
Payer: MEDICARE

## 2024-03-14 VITALS
SYSTOLIC BLOOD PRESSURE: 98 MMHG | TEMPERATURE: 97.8 F | DIASTOLIC BLOOD PRESSURE: 63 MMHG | RESPIRATION RATE: 18 BRPM | OXYGEN SATURATION: 96 % | HEART RATE: 95 BPM

## 2024-03-14 DIAGNOSIS — M46.1 SACROILIITIS (HCC): ICD-10-CM

## 2024-03-14 PROCEDURE — 27096 INJECT SACROILIAC JOINT: CPT | Performed by: ANESTHESIOLOGY

## 2024-03-14 RX ORDER — ROPIVACAINE HYDROCHLORIDE 2 MG/ML
2 INJECTION, SOLUTION EPIDURAL; INFILTRATION; PERINEURAL ONCE
Status: COMPLETED | OUTPATIENT
Start: 2024-03-14 | End: 2024-03-14

## 2024-03-14 RX ORDER — METHYLPREDNISOLONE ACETATE 80 MG/ML
80 INJECTION, SUSPENSION INTRA-ARTICULAR; INTRALESIONAL; INTRAMUSCULAR; PARENTERAL; SOFT TISSUE ONCE
Status: COMPLETED | OUTPATIENT
Start: 2024-03-14 | End: 2024-03-14

## 2024-03-14 RX ADMIN — ROPIVACAINE HYDROCHLORIDE 2 ML: 2 INJECTION EPIDURAL; INFILTRATION; PERINEURAL at 13:40

## 2024-03-14 RX ADMIN — IOHEXOL 0.4 ML: 300 INJECTION, SOLUTION INTRAVENOUS at 13:40

## 2024-03-14 RX ADMIN — METHYLPREDNISOLONE ACETATE 80 MG: 80 INJECTION, SUSPENSION INTRA-ARTICULAR; INTRALESIONAL; INTRAMUSCULAR; SOFT TISSUE at 13:40

## 2024-03-14 NOTE — H&P
History of Present Illness: The patient is a 63 y.o. female who presents with complaints of low back pain.    Past Medical History:   Diagnosis Date    Anemia 2008    Due to heavy menstruation    Arthritis     Asthma 2005    Seasonal    Diabetes mellitus (HCC) 2006    GERD (gastroesophageal reflux disease) 2008    Could be due to gastroparesis    Hyperlipidemia     Hypertension 2005    Pneumonia 2018    Walking pneumonia    TIA (transient ischemic attack)        Past Surgical History:   Procedure Laterality Date    ADENOIDECTOMY  1965    BACK SURGERY      cervical and lumbar    CHOLECYSTECTOMY  2006    COLONOSCOPY      RI BIOPSY SOFT TISSUE BACK/FLANK DEEP N/A 2/7/2024    Procedure: EXCISION MASS BACK;  Surgeon: Ottoniel Lake MD;  Location:  MAIN OR;  Service: General    SPINE SURGERY  2018, 2019 and 2021    ACDF 2018, Laminectomy 2019 and fusion 2021    TONSILLECTOMY  1965    TUBAL LIGATION           Current Outpatient Medications:     acetaminophen (TYLENOL) 650 mg CR tablet, Take 1,300 mg by mouth 2 (two) times a day, Disp: , Rfl:     Alcohol Swabs (Alcohol Prep) PADS, Use in the morning, Disp: 100 each, Rfl: 3    atorvastatin (LIPITOR) 40 mg tablet, TAKE 1 TABLET DAILY, Disp: 90 tablet, Rfl: 0    b complex vitamins capsule, Take 1 capsule by mouth daily, Disp: , Rfl:     BIOTIN BEAUTY EXTRA STRENGTH PO, Take by mouth, Disp: , Rfl:     Blood Glucose Monitoring Suppl (ONE TOUCH ULTRA 2) w/Device KIT, Test once daily, Disp: 1 kit, Rfl: 0    Empagliflozin-linaGLIPtin (Glyxambi) 25-5 MG TABS, Take 1 tab daily, Disp: 90 tablet, Rfl: 1    EPINEPHrine (EPIPEN) 0.3 mg/0.3 mL SOAJ, Inject 0.3 mL (0.3 mg total) into a muscle once for 1 dose, Disp: 0.6 mL, Rfl: 0    glimepiride (AMARYL) 4 mg tablet, 1 tab bid., Disp: 180 tablet, Rfl: 1    glucose blood (OneTouch Ultra) test strip, Test once daily, Disp: 100 each, Rfl: 3    ibuprofen (MOTRIN) 800 mg tablet, As needed, Disp: , Rfl:     Lancets (onetouch ultrasoft)  lancets, Test once daily, Disp: 100 each, Rfl: 3    lisinopril (ZESTRIL) 20 mg tablet, TAKE 1 TABLET DAILY, Disp: 90 tablet, Rfl: 1    metFORMIN (GLUCOPHAGE) 1000 MG tablet, Take 1 tablet (1,000 mg total) by mouth 2 (two) times a day with meals, Disp: 180 tablet, Rfl: 3    Multiple Vitamin (MULTIVITAMIN ADULT PO), Take 1 tablet by mouth daily, Disp: , Rfl:     Omega-3 Fatty Acids (Fish Oil) 1200 MG CAPS, , Disp: , Rfl:     omeprazole (PriLOSEC) 40 MG capsule, TAKE 1 CAPSULE DAILY, Disp: 90 capsule, Rfl: 1    Rhubarb (ESTROVEN MENOPAUSE RELIEF PO), Take 1 tablet by mouth daily, Disp: , Rfl:     Symbicort 160-4.5 MCG/ACT inhaler, INHALE 2 PUFFS BY MOUTH 2 TIMES A DAY RINSE MOUTH AFTER USE., Disp: 30.6 g, Rfl: 2    tiZANidine (ZANAFLEX) 4 mg tablet, Take 1 tablet (4 mg total) by mouth daily at bedtime, Disp: 30 tablet, Rfl: 2    TURMERIC PO, Take 500 mg by mouth daily, Disp: , Rfl:     Current Facility-Administered Medications:     iohexol (OMNIPAQUE) 300 mg/mL injection 0.4 mL, 0.4 mL, Intra-articular, Once, Simeon Hayes DO    methylPREDNISolone acetate (DEPO-MEDROL) injection 80 mg, 80 mg, Intra-articular, Once, Simeon Hayes DO    ropivacaine (NAROPIN) injection 2 mL, 2 mL, Intra-articular, Once, Simeon Hayes DO    Allergies   Allergen Reactions    Shellfish-Derivedproducts [Shellfish-Derived Products - Food Allergy] Anaphylaxis    Phenytoin Hives    Shellfish Allergy - Food Allergy Other (See Comments)       Physical Exam:   General: Awake, Alert, Oriented x 3, Mood and affect appropriate  Respiratory: Respirations even and unlabored  Cardiovascular: Peripheral pulses intact; no edema  Musculoskeletal Exam: Normal gait    ASA Score: II         Assessment:   1. Sacroiliitis (HCC)        Plan: B/L SIJ

## 2024-03-14 NOTE — DISCHARGE INSTRUCTIONS

## 2024-03-21 ENCOUNTER — TELEPHONE (OUTPATIENT)
Dept: PAIN MEDICINE | Facility: CLINIC | Age: 64
End: 2024-03-21

## 2024-03-25 NOTE — TELEPHONE ENCOUNTER
Patient reports 70-80% improvement post inj  Pain level 7/10  Pain was good for the first week, but is now declining. Patient is experiencing back spasms as well.      B/L SIJ INJ 3/14, 4/4 F/u

## 2024-03-28 ENCOUNTER — OFFICE VISIT (OUTPATIENT)
Dept: FAMILY MEDICINE CLINIC | Facility: CLINIC | Age: 64
End: 2024-03-28
Payer: MEDICARE

## 2024-03-28 VITALS
HEIGHT: 62 IN | SYSTOLIC BLOOD PRESSURE: 102 MMHG | DIASTOLIC BLOOD PRESSURE: 72 MMHG | HEART RATE: 101 BPM | TEMPERATURE: 97.5 F | BODY MASS INDEX: 27.31 KG/M2 | WEIGHT: 148.4 LBS | RESPIRATION RATE: 14 BRPM | OXYGEN SATURATION: 97 %

## 2024-03-28 DIAGNOSIS — Z23 ENCOUNTER FOR IMMUNIZATION: ICD-10-CM

## 2024-03-28 DIAGNOSIS — I47.10 PAROXYSMAL SUPRAVENTRICULAR TACHYCARDIA: ICD-10-CM

## 2024-03-28 DIAGNOSIS — M96.1 CERVICAL POST-LAMINECTOMY SYNDROME: ICD-10-CM

## 2024-03-28 DIAGNOSIS — T78.40XA ALLERGIC REACTION, INITIAL ENCOUNTER: ICD-10-CM

## 2024-03-28 DIAGNOSIS — F51.01 PRIMARY INSOMNIA: ICD-10-CM

## 2024-03-28 DIAGNOSIS — Z13.6 ENCOUNTER FOR ABDOMINAL AORTIC ANEURYSM (AAA) SCREENING: ICD-10-CM

## 2024-03-28 DIAGNOSIS — Z00.00 WELCOME TO MEDICARE PREVENTIVE VISIT: Primary | ICD-10-CM

## 2024-03-28 DIAGNOSIS — J45.20 MILD INTERMITTENT ASTHMA WITHOUT COMPLICATION: ICD-10-CM

## 2024-03-28 DIAGNOSIS — J45.20 MILD INTERMITTENT ASTHMA, UNSPECIFIED WHETHER COMPLICATED: ICD-10-CM

## 2024-03-28 DIAGNOSIS — I10 HYPERTENSION, UNSPECIFIED TYPE: ICD-10-CM

## 2024-03-28 PROCEDURE — G0009 ADMIN PNEUMOCOCCAL VACCINE: HCPCS

## 2024-03-28 PROCEDURE — G0402 INITIAL PREVENTIVE EXAM: HCPCS | Performed by: FAMILY MEDICINE

## 2024-03-28 PROCEDURE — 90677 PCV20 VACCINE IM: CPT

## 2024-03-28 RX ORDER — TRAZODONE HYDROCHLORIDE 50 MG/1
50 TABLET ORAL
Qty: 30 TABLET | Refills: 1 | Status: SHIPPED | OUTPATIENT
Start: 2024-03-28

## 2024-03-28 RX ORDER — IBUPROFEN 800 MG/1
800 TABLET ORAL EVERY 8 HOURS PRN
Qty: 60 TABLET | Refills: 1 | Status: SHIPPED | OUTPATIENT
Start: 2024-03-28

## 2024-03-28 RX ORDER — EPINEPHRINE 0.3 MG/.3ML
0.3 INJECTION SUBCUTANEOUS ONCE
Qty: 0.6 ML | Refills: 0 | Status: SHIPPED | OUTPATIENT
Start: 2024-03-28 | End: 2024-03-28

## 2024-03-28 RX ORDER — LISINOPRIL 10 MG/1
10 TABLET ORAL DAILY
Qty: 90 TABLET | Refills: 1 | Status: SHIPPED | OUTPATIENT
Start: 2024-03-28

## 2024-03-28 RX ORDER — ALBUTEROL SULFATE 90 UG/1
2 AEROSOL, METERED RESPIRATORY (INHALATION) EVERY 6 HOURS PRN
Qty: 18 G | Refills: 1 | Status: SHIPPED | OUTPATIENT
Start: 2024-03-28

## 2024-03-28 NOTE — PROGRESS NOTES
Assessment and Plan:     Problem List Items Addressed This Visit          Cardiovascular and Mediastinum    Hypertension    Relevant Medications    lisinopril (ZESTRIL) 10 mg tablet    EPINEPHrine (EPIPEN) 0.3 mg/0.3 mL SOAJ    Paroxysmal supraventricular tachycardia    Relevant Medications    EPINEPHrine (EPIPEN) 0.3 mg/0.3 mL SOAJ       Respiratory    Asthma    Relevant Medications    albuterol (ProAir HFA) 90 mcg/act inhaler    EPINEPHrine (EPIPEN) 0.3 mg/0.3 mL SOAJ       Surgery/Wound/Pain    Cervical post-laminectomy syndrome    Relevant Medications    ibuprofen (MOTRIN) 800 mg tablet     Other Visit Diagnoses       Welcome to Medicare preventive visit    -  Primary    Relevant Orders    US abdominal aorta screening aaa    Primary insomnia        Relevant Medications    traZODone (DESYREL) 50 mg tablet    Encounter for immunization        Relevant Orders    Pneumococcal Conjugate Vaccine 20-valent (Pcv20) (Completed)    Encounter for abdominal aortic aneurysm (AAA) screening        Relevant Orders    US abdominal aorta screening aaa    Allergic reaction, initial encounter        Relevant Medications    ibuprofen (MOTRIN) 800 mg tablet    Other Relevant Orders    Ambulatory Referral to Allergy            Patient referred to allergist  Refilled ibuprofen AAA screen ordered  Trazodone started at 50 mg  Refilled EpiPen  Medicare wellness visit completed patient just had an EKG done so this was not ordered   she will continue her other medications  She can follow-up with me in 6 months or sooner if needed  Prevnar 20 given          Depression Screening and Follow-up Plan: Patient was screened for depression during today's encounter. They screened negative with a PHQ-2 score of 0.      Preventive health issues were discussed with patient, and age appropriate screening tests were ordered as noted in patient's After Visit Summary.  Personalized health advice and appropriate referrals for health education or  preventive services given if needed, as noted in patient's After Visit Summary.     History of Present Illness:     Patient presents for a Medicare Wellness Visit    Patient presents today for welcome to Medicare visit  She is having hard time sleeping  She would like to see an allergist that she has a significant reaction to seafood  Her blood pressures been running very low and she is wondering if a decrease in lisinopril would be appropriate         Patient Care Team:  Urbano Goncalves DO as PCP - General (Family Medicine)  Rob Zuniga DO as PCP - Endocrinology (Endocrinology)  Lit Ramsey PA-C as Physician Assistant (Endocrinology)     Review of Systems:     Review of Systems   Constitutional: Negative.    HENT: Negative.     Eyes: Negative.    Respiratory: Negative.     Cardiovascular: Negative.    Gastrointestinal: Negative.    Endocrine: Negative.    Genitourinary: Negative.    Musculoskeletal: Negative.    Skin: Negative.    Allergic/Immunologic: Negative.    Neurological: Negative.    Hematological: Negative.    Psychiatric/Behavioral: Negative.     All other systems reviewed and are negative.       Problem List:     Patient Active Problem List   Diagnosis    Asthma    Type 2 diabetes mellitus with diabetic polyneuropathy, without long-term current use of insulin (HCC)    Family history of anesthesia complication    Gastroparesis    History of seizure    Hyperlipidemia    Hypertension    Lumbar stenosis    Cervical post-laminectomy syndrome    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HCC)    Gastro-esophageal reflux disease with esophagitis    Paroxysmal supraventricular tachycardia    Pain in left ankle and joints of left foot    Environmental allergies    Hip pain, right    Sacroiliitis (HCC)    Mass of skin of back      Past Medical and Surgical History:     Past Medical History:   Diagnosis Date    Anemia 2008    Due to heavy menstruation    Arthritis     Asthma 2005    Seasonal    Diabetes mellitus  (HCC)     GERD (gastroesophageal reflux disease)     Could be due to gastroparesis    Hyperlipidemia     Hypertension 2005    Pneumonia 2018    Walking pneumonia    TIA (transient ischemic attack)      Past Surgical History:   Procedure Laterality Date    ADENOIDECTOMY  1965    BACK SURGERY      cervical and lumbar    CHOLECYSTECTOMY  2006    COLONOSCOPY      CT BIOPSY SOFT TISSUE BACK/FLANK DEEP N/A 2024    Procedure: EXCISION MASS BACK;  Surgeon: Ottoniel Lake MD;  Location:  MAIN OR;  Service: General    SPINE SURGERY  ,  and     ACDF , Laminectomy  and fusion     TONSILLECTOMY  1965    TUBAL LIGATION        Family History:     Family History   Problem Relation Age of Onset    Cancer Mother         Unknown    Clotting disorder Mother         Factor XI deficiency    Coronary artery disease Father          at 52 years    Diabetes Father     Hypertension Father     Diabetes type II Father     Breast cancer Sister         50's    Cancer Sister         Breast cancer twice    COPD Sister     No Known Problems Daughter     No Known Problems Daughter     No Known Problems Daughter     No Known Problems Daughter     No Known Problems Maternal Grandmother     No Known Problems Maternal Grandfather     No Known Problems Paternal Grandmother     No Known Problems Paternal Grandfather     No Known Problems Maternal Aunt     Breast cancer Paternal Aunt         60's    Cancer Paternal Aunt         Breast cancer    Diabetes Paternal Aunt     Hypertension Paternal Aunt     Coronary artery disease Brother         Congestive Heart Failure    Heart failure Brother     Hypertension Brother     Heart failure Paternal Uncle     Hypertension Paternal Uncle     Heart failure Paternal Uncle     Hypertension Paternal Uncle     Diabetes type II Family         Daughter    Hypothyroidism Family         Daughter      Social History:     Social History     Socioeconomic History    Marital  status: /Civil Union     Spouse name: None    Number of children: None    Years of education: None    Highest education level: None   Occupational History    None   Tobacco Use    Smoking status: Former     Current packs/day: 0.00     Average packs/day: 0.5 packs/day for 2.0 years (1.0 ttl pk-yrs)     Types: Cigarettes     Start date: 1983     Quit date: 1985     Years since quittin.2     Passive exposure: Past    Smokeless tobacco: Never    Tobacco comments:      years ago   Vaping Use    Vaping status: Never Used   Substance and Sexual Activity    Alcohol use: Yes     Comment: Occasional    Drug use: Never    Sexual activity: Not Currently     Partners: Male     Birth control/protection: Post-menopausal, Female Sterilization   Other Topics Concern    None   Social History Narrative    None     Social Determinants of Health     Financial Resource Strain: Low Risk  (3/28/2024)    Overall Financial Resource Strain (CARDIA)     Difficulty of Paying Living Expenses: Not hard at all   Food Insecurity: No Food Insecurity (3/28/2024)    Hunger Vital Sign     Worried About Running Out of Food in the Last Year: Never true     Ran Out of Food in the Last Year: Never true   Transportation Needs: Unmet Transportation Needs (3/28/2024)    PRAPARE - Transportation     Lack of Transportation (Medical): Yes     Lack of Transportation (Non-Medical): Yes   Physical Activity: Not on file   Stress: Not on file   Social Connections: Not on file   Intimate Partner Violence: Not At Risk (3/28/2024)    Humiliation, Afraid, Rape, and Kick questionnaire     Fear of Current or Ex-Partner: No     Emotionally Abused: No     Physically Abused: No     Sexually Abused: No   Housing Stability: Low Risk  (3/28/2024)    Housing Stability Vital Sign     Unable to Pay for Housing in the Last Year: No     Number of Places Lived in the Last Year: 1     Unstable Housing in the Last Year: No      Medications and Allergies:     Current  Outpatient Medications   Medication Sig Dispense Refill    acetaminophen (TYLENOL) 650 mg CR tablet Take 1,300 mg by mouth 2 (two) times a day      albuterol (ProAir HFA) 90 mcg/act inhaler Inhale 2 puffs every 6 (six) hours as needed for wheezing 18 g 1    Alcohol Swabs (Alcohol Prep) PADS Use in the morning 100 each 3    atorvastatin (LIPITOR) 40 mg tablet TAKE 1 TABLET DAILY 90 tablet 0    b complex vitamins capsule Take 1 capsule by mouth daily      BIOTIN BEAUTY EXTRA STRENGTH PO Take by mouth      Blood Glucose Monitoring Suppl (ONE TOUCH ULTRA 2) w/Device KIT Test once daily 1 kit 0    Empagliflozin-linaGLIPtin (Glyxambi) 25-5 MG TABS Take 1 tab daily 90 tablet 1    EPINEPHrine (EPIPEN) 0.3 mg/0.3 mL SOAJ Inject 0.3 mL (0.3 mg total) into a muscle once for 1 dose 0.6 mL 0    glimepiride (AMARYL) 4 mg tablet 1 tab bid. 180 tablet 1    glucose blood (OneTouch Ultra) test strip Test once daily 100 each 3    ibuprofen (MOTRIN) 800 mg tablet Take 1 tablet (800 mg total) by mouth every 8 (eight) hours as needed for mild pain As needed 60 tablet 1    Lancets (onetouch ultrasoft) lancets Test once daily 100 each 3    lisinopril (ZESTRIL) 10 mg tablet Take 1 tablet (10 mg total) by mouth daily 90 tablet 1    metFORMIN (GLUCOPHAGE) 1000 MG tablet Take 1 tablet (1,000 mg total) by mouth 2 (two) times a day with meals 180 tablet 3    Multiple Vitamin (MULTIVITAMIN ADULT PO) Take 1 tablet by mouth daily      Omega-3 Fatty Acids (Fish Oil) 1200 MG CAPS       omeprazole (PriLOSEC) 40 MG capsule TAKE 1 CAPSULE DAILY 90 capsule 1    Rhubarb (ESTROVEN MENOPAUSE RELIEF PO) Take 1 tablet by mouth daily      Symbicort 160-4.5 MCG/ACT inhaler INHALE 2 PUFFS BY MOUTH 2 TIMES A DAY RINSE MOUTH AFTER USE. 30.6 g 2    tiZANidine (ZANAFLEX) 4 mg tablet Take 1 tablet (4 mg total) by mouth daily at bedtime 30 tablet 2    traZODone (DESYREL) 50 mg tablet Take 1 tablet (50 mg total) by mouth daily at bedtime 30 tablet 1    TURMERIC PO Take  500 mg by mouth daily       No current facility-administered medications for this visit.     Allergies   Allergen Reactions    Shellfish-Derivedproducts [Shellfish-Derived Products - Food Allergy] Anaphylaxis    Phenytoin Hives    Shellfish Allergy - Food Allergy Other (See Comments)      Immunizations:     Immunization History   Administered Date(s) Administered    COVID-19 PFIZER VACCINE 0.3 ML IM 03/20/2021, 04/04/2021, 12/20/2021, 12/20/2021, 11/17/2022    COVID-19 Pfizer Vac BIVALENT Jorje-sucrose 12 Yr+ IM 11/17/2022    COVID-19 Pfizer mRNA vacc PF jorje-sucrose 12 yr and older (Comirnaty) 11/01/2023    INFLUENZA 10/09/2013, 11/03/2014, 11/05/2015, 10/06/2016, 10/11/2017, 11/27/2018, 10/03/2019, 09/26/2020, 10/24/2021, 11/17/2022, 11/01/2023    Pneumococcal Conjugate 13-Valent 10/11/2017    Pneumococcal Conjugate Vaccine 20-valent (Pcv20), Polysace 03/28/2024      Health Maintenance:         Topic Date Due    Hepatitis C Screening  Never done    HIV Screening  Never done    Breast Cancer Screening: Mammogram  10/25/2024    Colorectal Cancer Screening  11/26/2028         Topic Date Due    COVID-19 Vaccine (8 - 2023-24 season) 12/27/2023      Medicare Screening Tests and Risk Assessments:     Jada is here for her Welcome to Medicare visit.     Health Risk Assessment:   Patient rates overall health as good. Patient feels that their physical health rating is same. Patient is satisfied with their life. Eyesight was rated as same. Hearing was rated as same. Patient feels that their emotional and mental health rating is same. Patients states they are never, rarely angry. Patient states they are never, rarely unusually tired/fatigued. Pain experienced in the last 7 days has been none.     Depression Screening:   PHQ-2 Score: 0      Fall Risk Screening:   In the past year, patient has experienced: no history of falling in past year      Urinary Incontinence Screening:   Patient has not leaked urine accidently in the  last six months.     Home Safety:  Patient does not have trouble with stairs inside or outside of their home. Patient has working smoke alarms and has working carbon monoxide detector. Home safety hazards include: none.     Nutrition:   Current diet is Regular.     Medications:   Patient is currently taking over-the-counter supplements. OTC medications include: see medication list. Patient is able to manage medications.     Activities of Daily Living (ADLs)/Instrumental Activities of Daily Living (IADLs):   Walk and transfer into and out of bed and chair?: Yes  Dress and groom yourself?: Yes    Bathe or shower yourself?: Yes    Feed yourself? Yes  Do your laundry/housekeeping?: Yes  Manage your money, pay your bills and track your expenses?: Yes  Make your own meals?: Yes    Do your own shopping?: Yes    Previous Hospitalizations:   Any hospitalizations or ED visits within the last 12 months?: No      Advance Care Planning:   Living will: No    Durable POA for healthcare: No    Advanced directive: No    Advanced directive counseling given: Yes    End of Life Decisions reviewed with patient: Yes      Cognitive Screening:   Provider or family/friend/caregiver concerned regarding cognition?: No    PREVENTIVE SCREENINGS      Cardiovascular Screening:    General: History Lipid Disorder and Screening Current      Diabetes Screening:     General: History Diabetes and Screening Current      Colorectal Cancer Screening:     General: Screening Current      Breast Cancer Screening:     General: Screening Current      Cervical Cancer Screening:    General: Screening Not Indicated and Screening Current      Osteoporosis Screening:    General: Screening Current      Abdominal Aortic Aneurysm (AAA) Screening:        General: Screening Not Indicated      Lung Cancer Screening:     General: Screening Not Indicated      Hepatitis C Screening:    General: Screening Not Indicated    Screening, Brief Intervention, and Referral to  "Treatment (SBIRT)    Screening    Typical number of drinks in a week: 0    Single Item Drug Screening:  How often have you used an illegal drug (including marijuana) or a prescription medication for non-medical reasons in the past year? never    Single Item Drug Screen Score: 0  Interpretation: Negative screen for possible drug use disorder    Brief Intervention  Alcohol & drug use screenings were reviewed. No concerns regarding substance use disorder identified.     SDOH Risk Assessment  Social determinants of health (SDOH) risk assesment tool was completed. The tool at a minimum covered housing stability, food insecurity, transportation needs, and utility difficulty. Patient had at risk responses for the following SDOH domains: transportation needs.     Other Counseling Topics:   Car/seat belt/driving safety, skin self-exam, sunscreen and regular weightbearing exercise and calcium and vitamin D intake.     Vision Screening    Right eye Left eye Both eyes   Without correction      With correction   20/20        Physical Exam:     /72 (BP Location: Left arm, Patient Position: Sitting, Cuff Size: Large)   Pulse 101   Temp 97.5 °F (36.4 °C)   Resp 14   Ht 5' 2.25\" (1.581 m)   Wt 67.3 kg (148 lb 6.4 oz)   SpO2 97%   BMI 26.93 kg/m²     Physical Exam  Vitals and nursing note reviewed.   Constitutional:       Appearance: Normal appearance. She is well-developed.   HENT:      Head: Normocephalic.      Right Ear: External ear normal.      Left Ear: External ear normal.      Nose: Nose normal.   Eyes:      Conjunctiva/sclera: Conjunctivae normal.      Pupils: Pupils are equal, round, and reactive to light.   Cardiovascular:      Rate and Rhythm: Normal rate and regular rhythm.      Pulses: no weak pulses.           Dorsalis pedis pulses are 2+ on the right side and 2+ on the left side.        Posterior tibial pulses are 2+ on the right side and 2+ on the left side.      Heart sounds: Normal heart sounds. "   Pulmonary:      Effort: Pulmonary effort is normal.      Breath sounds: Normal breath sounds.   Abdominal:      General: Bowel sounds are normal.      Palpations: Abdomen is soft.   Musculoskeletal:         General: Normal range of motion.      Cervical back: Normal range of motion and neck supple.   Feet:      Right foot:      Skin integrity: No ulcer, skin breakdown, erythema, warmth, callus or dry skin.      Left foot:      Skin integrity: No ulcer, skin breakdown, erythema, warmth, callus or dry skin.   Skin:     General: Skin is warm and dry.   Neurological:      General: No focal deficit present.      Mental Status: She is alert and oriented to person, place, and time.   Psychiatric:         Behavior: Behavior normal.         Thought Content: Thought content normal.         Judgment: Judgment normal.      Diabetic Foot Exam    Patient's shoes and socks removed.    Right Foot/Ankle   Right Foot Inspection  Skin Exam: skin normal. Skin not intact, no dry skin, no warmth, no callus, no erythema, no maceration, no abnormal color, no pre-ulcer, no ulcer and no callus.     Toe Exam: ROM and strength within normal limits.     Sensory   Vibration: intact  Proprioception: intact  Monofilament testing: intact    Vascular  Capillary refills: < 3 seconds  The right DP pulse is 2+. The right PT pulse is 2+.     Left Foot/Ankle  Left Foot Inspection  Skin Exam: skin normal. Skin not intact, no dry skin, no warmth, no erythema, no maceration, normal color, no pre-ulcer, no ulcer and no callus.     Toe Exam: ROM and strength within normal limits.     Sensory   Vibration: intact  Proprioception: intact  Monofilament testing: intact    Vascular  Capillary refills: < 3 seconds  The left DP pulse is 2+. The left PT pulse is 2+.     Assign Risk Category  No deformity present  No loss of protective sensation  No weak pulses  Risk: 0      Urbano Page, DO

## 2024-03-28 NOTE — PATIENT INSTRUCTIONS
Medicare Preventive Visit Patient Instructions  Thank you for completing your Welcome to Medicare Visit or Medicare Annual Wellness Visit today. Your next wellness visit will be due in one year (3/29/2025).  The screening/preventive services that you may require over the next 5-10 years are detailed below. Some tests may not apply to you based off risk factors and/or age. Screening tests ordered at today's visit but not completed yet may show as past due. Also, please note that scanned in results may not display below.  Preventive Screenings:  Service Recommendations Previous Testing/Comments   Colorectal Cancer Screening  * Colonoscopy    * Fecal Occult Blood Test (FOBT)/Fecal Immunochemical Test (FIT)  * Fecal DNA/Cologuard Test  * Flexible Sigmoidoscopy Age: 45-75 years old   Colonoscopy: every 10 years (may be performed more frequently if at higher risk)  OR  FOBT/FIT: every 1 year  OR  Cologuard: every 3 years  OR  Sigmoidoscopy: every 5 years  Screening may be recommended earlier than age 45 if at higher risk for colorectal cancer. Also, an individualized decision between you and your healthcare provider will decide whether screening between the ages of 76-85 would be appropriate. Colonoscopy: 11/26/2018  FOBT/FIT: Not on file  Cologuard: Not on file  Sigmoidoscopy: Not on file          Breast Cancer Screening Age: 40+ years old  Frequency: every 1-2 years  Not required if history of left and right mastectomy Mammogram: 10/25/2023        Cervical Cancer Screening Between the ages of 21-29, pap smear recommended once every 3 years.   Between the ages of 30-65, can perform pap smear with HPV co-testing every 5 years.   Recommendations may differ for women with a history of total hysterectomy, cervical cancer, or abnormal pap smears in past. Pap Smear: 11/06/2023        Hepatitis C Screening Once for adults born between 1945 and 1965  More frequently in patients at high risk for Hepatitis C Hep C Antibody: Not on  file        Diabetes Screening 1-2 times per year if you're at risk for diabetes or have pre-diabetes Fasting glucose: 150 mg/dL (9/23/2023)  A1C: 7.0 % (12/20/2023)      Cholesterol Screening Once every 5 years if you don't have a lipid disorder. May order more often based on risk factors. Lipid panel: 09/23/2023          Other Preventive Screenings Covered by Medicare:  Abdominal Aortic Aneurysm (AAA) Screening: covered once if your at risk. You're considered to be at risk if you have a family history of AAA.  Lung Cancer Screening: covers low dose CT scan once per year if you meet all of the following conditions: (1) Age 55-77; (2) No signs or symptoms of lung cancer; (3) Current smoker or have quit smoking within the last 15 years; (4) You have a tobacco smoking history of at least 20 pack years (packs per day multiplied by number of years you smoked); (5) You get a written order from a healthcare provider.  Glaucoma Screening: covered annually if you're considered high risk: (1) You have diabetes OR (2) Family history of glaucoma OR (3)  aged 50 and older OR (4)  American aged 65 and older  Osteoporosis Screening: covered every 2 years if you meet one of the following conditions: (1) You're estrogen deficient and at risk for osteoporosis based off medical history and other findings; (2) Have a vertebral abnormality; (3) On glucocorticoid therapy for more than 3 months; (4) Have primary hyperparathyroidism; (5) On osteoporosis medications and need to assess response to drug therapy.   Last bone density test (DXA Scan): Not on file.  HIV Screening: covered annually if you're between the age of 15-65. Also covered annually if you are younger than 15 and older than 65 with risk factors for HIV infection. For pregnant patients, it is covered up to 3 times per pregnancy.    Immunizations:  Immunization Recommendations   Influenza Vaccine Annual influenza vaccination during flu season is  recommended for all persons aged >= 6 months who do not have contraindications   Pneumococcal Vaccine   * Pneumococcal conjugate vaccine = PCV13 (Prevnar 13), PCV15 (Vaxneuvance), PCV20 (Prevnar 20)  * Pneumococcal polysaccharide vaccine = PPSV23 (Pneumovax) Adults 19-63 yo with certain risk factors or if 65+ yo  If never received any pneumonia vaccine: recommend Prevnar 20 (PCV20)  Give PCV20 if previously received 1 dose of PCV13 or PPSV23   Hepatitis B Vaccine 3 dose series if at intermediate or high risk (ex: diabetes, end stage renal disease, liver disease)   Respiratory syncytial virus (RSV) Vaccine - COVERED BY MEDICARE PART D  * RSVPreF3 (Arexvy) CDC recommends that adults 60 years of age and older may receive a single dose of RSV vaccine using shared clinical decision-making (SCDM)   Tetanus (Td) Vaccine - COST NOT COVERED BY MEDICARE PART B Following completion of primary series, a booster dose should be given every 10 years to maintain immunity against tetanus. Td may also be given as tetanus wound prophylaxis.   Tdap Vaccine - COST NOT COVERED BY MEDICARE PART B Recommended at least once for all adults. For pregnant patients, recommended with each pregnancy.   Shingles Vaccine (Shingrix) - COST NOT COVERED BY MEDICARE PART B  2 shot series recommended in those 19 years and older who have or will have weakened immune systems or those 50 years and older     Health Maintenance Due:      Topic Date Due   • Hepatitis C Screening  Never done   • HIV Screening  Never done   • Breast Cancer Screening: Mammogram  10/25/2024   • Colorectal Cancer Screening  11/26/2028     Immunizations Due:      Topic Date Due   • Pneumococcal Vaccine: 65+ Years (2 of 2 - PPSV23 or PCV20) 12/06/2017   • COVID-19 Vaccine (8 - 2023-24 season) 12/27/2023     Advance Directives   What are advance directives?  Advance directives are legal documents that state your wishes and plans for medical care. These plans are made ahead of time in  case you lose your ability to make decisions for yourself. Advance directives can apply to any medical decision, such as the treatments you want, and if you want to donate organs.   What are the types of advance directives?  There are many types of advance directives, and each state has rules about how to use them. You may choose a combination of any of the following:  Living will:  This is a written record of the treatment you want. You can also choose which treatments you do not want, which to limit, and which to stop at a certain time. This includes surgery, medicine, IV fluid, and tube feedings.   Durable power of  for healthcare (DPAHC):  This is a written record that states who you want to make healthcare choices for you when you are unable to make them for yourself. This person, called a proxy, is usually a family member or a friend. You may choose more than 1 proxy.  Do not resuscitate (DNR) order:  A DNR order is used in case your heart stops beating or you stop breathing. It is a request not to have certain forms of treatment, such as CPR. A DNR order may be included in other types of advance directives.  Medical directive:  This covers the care that you want if you are in a coma, near death, or unable to make decisions for yourself. You can list the treatments you want for each condition. Treatment may include pain medicine, surgery, blood transfusions, dialysis, IV or tube feedings, and a ventilator (breathing machine).  Values history:  This document has questions about your views, beliefs, and how you feel and think about life. This information can help others choose the care that you would choose.  Why are advance directives important?  An advance directive helps you control your care. Although spoken wishes may be used, it is better to have your wishes written down. Spoken wishes can be misunderstood, or not followed. Treatments may be given even if you do not want them. An advance directive  may make it easier for your family to make difficult choices about your care.   Weight Management   Why it is important to manage your weight:  Being overweight increases your risk of health conditions such as heart disease, high blood pressure, type 2 diabetes, and certain types of cancer. It can also increase your risk for osteoarthritis, sleep apnea, and other respiratory problems. Aim for a slow, steady weight loss. Even a small amount of weight loss can lower your risk of health problems.  How to lose weight safely:  A safe and healthy way to lose weight is to eat fewer calories and get regular exercise. You can lose up about 1 pound a week by decreasing the number of calories you eat by 500 calories each day.   Healthy meal plan for weight management:  A healthy meal plan includes a variety of foods, contains fewer calories, and helps you stay healthy. A healthy meal plan includes the following:  Eat whole-grain foods more often.  A healthy meal plan should contain fiber. Fiber is the part of grains, fruits, and vegetables that is not broken down by your body. Whole-grain foods are healthy and provide extra fiber in your diet. Some examples of whole-grain foods are whole-wheat breads and pastas, oatmeal, brown rice, and bulgur.  Eat a variety of vegetables every day.  Include dark, leafy greens such as spinach, kale, jonny greens, and mustard greens. Eat yellow and orange vegetables such as carrots, sweet potatoes, and winter squash.   Eat a variety of fruits every day.  Choose fresh or canned fruit (canned in its own juice or light syrup) instead of juice. Fruit juice has very little or no fiber.  Eat low-fat dairy foods.  Drink fat-free (skim) milk or 1% milk. Eat fat-free yogurt and low-fat cottage cheese. Try low-fat cheeses such as mozzarella and other reduced-fat cheeses.  Choose meat and other protein foods that are low in fat.  Choose beans or other legumes such as split peas or lentils. Choose fish,  skinless poultry (chicken or turkey), or lean cuts of red meat (beef or pork). Before you cook meat or poultry, cut off any visible fat.   Use less fat and oil.  Try baking foods instead of frying them. Add less fat, such as margarine, sour cream, regular salad dressing and mayonnaise to foods. Eat fewer high-fat foods. Some examples of high-fat foods include french fries, doughnuts, ice cream, and cakes.  Eat fewer sweets.  Limit foods and drinks that are high in sugar. This includes candy, cookies, regular soda, and sweetened drinks.  Exercise:  Exercise at least 30 minutes per day on most days of the week. Some examples of exercise include walking, biking, dancing, and swimming. You can also fit in more physical activity by taking the stairs instead of the elevator or parking farther away from stores. Ask your healthcare provider about the best exercise plan for you.      © Copyright [employer] 2018 Information is for End User's use only and may not be sold, redistributed or otherwise used for commercial purposes. All illustrations and images included in CareNotes® are the copyrighted property of [employer]M., Inc. or [employer]

## 2024-04-03 ENCOUNTER — TELEPHONE (OUTPATIENT)
Dept: FAMILY MEDICINE CLINIC | Facility: CLINIC | Age: 64
End: 2024-04-03

## 2024-04-03 NOTE — TELEPHONE ENCOUNTER
Ruby called in, she saw Dr. Goncalves on 3/28 for her MWV, he cut her lisinipril in half from   20 mg to 10 mg,  She said her latest readings are: 89/61, 88/63 and 87/62.  She said she is feeling worse - when she goes to stand up she feels like she is going to faint.      I did suggest she go to the ER but she said she wanted to wait to see what Dr. Goncalves suggests.    Thank you

## 2024-04-04 ENCOUNTER — OFFICE VISIT (OUTPATIENT)
Dept: PAIN MEDICINE | Facility: CLINIC | Age: 64
End: 2024-04-04
Payer: MEDICARE

## 2024-04-04 VITALS
HEIGHT: 62 IN | BODY MASS INDEX: 27.6 KG/M2 | SYSTOLIC BLOOD PRESSURE: 118 MMHG | WEIGHT: 150 LBS | TEMPERATURE: 97.2 F | DIASTOLIC BLOOD PRESSURE: 78 MMHG | HEART RATE: 82 BPM

## 2024-04-04 DIAGNOSIS — M46.1 SACROILIITIS (HCC): ICD-10-CM

## 2024-04-04 DIAGNOSIS — M54.16 LUMBAR RADICULOPATHY: Primary | ICD-10-CM

## 2024-04-04 DIAGNOSIS — M96.1 LUMBAR POSTLAMINECTOMY SYNDROME: ICD-10-CM

## 2024-04-04 PROCEDURE — 99214 OFFICE O/P EST MOD 30 MIN: CPT | Performed by: PHYSICIAN ASSISTANT

## 2024-04-04 PROCEDURE — G2211 COMPLEX E/M VISIT ADD ON: HCPCS | Performed by: PHYSICIAN ASSISTANT

## 2024-04-04 NOTE — PROGRESS NOTES
Assessment:  1. Lumbar radiculopathy    2. Lumbar postlaminectomy syndrome    3. Sacroiliitis (HCC)        Plan:  While the patient was in the office today, I did have a thorough conversation regarding their chronic pain syndrome, medication management, and treatment plan options.    After discussing options, I feel it is medically reasonable and necessary to obtain a new MRI of the lumbar spine due to her increasing radicular symptoms.  This will require contrast due to her past history of surgery and she does have a lab order in her chart which includes BUN and creatinine. I made her aware this would need to be completed prior to the MRI.  Once we obtain the results we will contact her with the next recommended steps.  This may include an epidural steroid injection.    Continue home exercises as instructed by physical therapy.    The patient was advised to contact the office should their symptoms worsen in the interim. The patient was agreeable and verbalized an understanding.        History of Present Illness:    The patient is a 63 y.o. female last seen on 3/14/2024 who presents for a follow up office visit in regards to chronic low back pain secondary to postlaminectomy pain syndrome, lumbar spondylosis and sacroiliitis.  The patient currently reports low back pain with intermittent radicular symptoms down the anterior aspects of bilateral lower extremities from the knees down.  She rates her current pain a 3 out of 10 and describes it as an intermittent dull, aching and cramping type of pain.  She does note intermittent numbness and paresthesias in the legs as well.  She has significant difficulty sleeping due to the leg pain and cramps.  The tizanidine does not provide much benefit with this.  She is going to try taking magnesium at nighttime.  On 3/14/2024 patient underwent bilateral sacroiliac joint injection and the patient noted 70 to 80% reduction in this particular pain pattern.  Patient states that at  this point the symptoms that affect her the most are the leg issues.  She has been attending physical therapy for quite some time and feels that she has reached a plateau.  She plans to continue home exercises once she is discharged from physical therapy.    I have personally reviewed and/or updated the patient's past medical history, past surgical history, family history, social history, current medications, allergies, and vital signs today.       Review of Systems:    Review of Systems   Respiratory:  Negative for shortness of breath.    Cardiovascular:  Negative for chest pain.   Gastrointestinal:  Negative for constipation, diarrhea, nausea and vomiting.   Musculoskeletal:  Negative for arthralgias, gait problem, joint swelling (Joint stiffness) and myalgias.   Skin:  Negative for rash.   Neurological:  Positive for dizziness. Negative for seizures and weakness.   All other systems reviewed and are negative.        Past Medical History:   Diagnosis Date   • Anemia 2008    Due to heavy menstruation   • Arthritis    • Asthma 2005    Seasonal   • Diabetes mellitus (HCC) 2006   • GERD (gastroesophageal reflux disease) 2008    Could be due to gastroparesis   • Hyperlipidemia    • Hypertension 2005   • Pneumonia 2018    Walking pneumonia   • TIA (transient ischemic attack)        Past Surgical History:   Procedure Laterality Date   • ADENOIDECTOMY  1965   • BACK SURGERY      cervical and lumbar   • CHOLECYSTECTOMY  2006   • COLONOSCOPY     • CT BONE MARROW BIOPSY AND ASPIRATION  6/8/2017   • SC BIOPSY SOFT TISSUE BACK/FLANK DEEP N/A 2/7/2024    Procedure: EXCISION MASS BACK;  Surgeon: Ottoniel Lake MD;  Location:  MAIN OR;  Service: General   • SPINE SURGERY  2018, 2019 and 2021    ACDF 2018, Laminectomy 2019 and fusion 2021   • TONSILLECTOMY  1965   • TUBAL LIGATION         Family History   Problem Relation Age of Onset   • Cancer Mother         Unknown   • Clotting disorder Mother         Factor XI deficiency    • Coronary artery disease Father          at 52 years   • Diabetes Father    • Hypertension Father    • Diabetes type II Father    • Breast cancer Sister         50's   • Cancer Sister         Breast cancer twice   • COPD Sister    • No Known Problems Daughter    • No Known Problems Daughter    • No Known Problems Daughter    • No Known Problems Daughter    • No Known Problems Maternal Grandmother    • No Known Problems Maternal Grandfather    • No Known Problems Paternal Grandmother    • No Known Problems Paternal Grandfather    • No Known Problems Maternal Aunt    • Breast cancer Paternal Aunt         60's   • Cancer Paternal Aunt         Breast cancer   • Diabetes Paternal Aunt    • Hypertension Paternal Aunt    • Coronary artery disease Brother         Congestive Heart Failure   • Heart failure Brother    • Hypertension Brother    • Heart failure Paternal Uncle    • Hypertension Paternal Uncle    • Heart failure Paternal Uncle    • Hypertension Paternal Uncle    • Diabetes type II Family         Daughter   • Hypothyroidism Family         Daughter       Social History     Occupational History   • Not on file   Tobacco Use   • Smoking status: Former     Current packs/day: 0.00     Average packs/day: 0.5 packs/day for 2.0 years (1.0 ttl pk-yrs)     Types: Cigarettes     Start date: 1983     Quit date: 1985     Years since quittin.2     Passive exposure: Past   • Smokeless tobacco: Never   • Tobacco comments:      years ago   Vaping Use   • Vaping status: Never Used   Substance and Sexual Activity   • Alcohol use: Yes     Comment: Occasional   • Drug use: Never   • Sexual activity: Not Currently     Partners: Male     Birth control/protection: Post-menopausal, Female Sterilization         Current Outpatient Medications:   •  acetaminophen (TYLENOL) 650 mg CR tablet, Take 1,300 mg by mouth 2 (two) times a day, Disp: , Rfl:   •  albuterol (ProAir HFA) 90 mcg/act inhaler, Inhale 2 puffs every 6  (six) hours as needed for wheezing, Disp: 18 g, Rfl: 1  •  atorvastatin (LIPITOR) 40 mg tablet, TAKE 1 TABLET DAILY, Disp: 90 tablet, Rfl: 0  •  b complex vitamins capsule, Take 1 capsule by mouth daily, Disp: , Rfl:   •  BIOTIN BEAUTY EXTRA STRENGTH PO, Take by mouth, Disp: , Rfl:   •  Empagliflozin-linaGLIPtin (Glyxambi) 25-5 MG TABS, Take 1 tab daily, Disp: 90 tablet, Rfl: 1  •  glimepiride (AMARYL) 4 mg tablet, 1 tab bid., Disp: 180 tablet, Rfl: 1  •  ibuprofen (MOTRIN) 800 mg tablet, Take 1 tablet (800 mg total) by mouth every 8 (eight) hours as needed for mild pain As needed, Disp: 60 tablet, Rfl: 1  •  metFORMIN (GLUCOPHAGE) 1000 MG tablet, Take 1 tablet (1,000 mg total) by mouth 2 (two) times a day with meals, Disp: 180 tablet, Rfl: 3  •  Multiple Vitamin (MULTIVITAMIN ADULT PO), Take 1 tablet by mouth daily, Disp: , Rfl:   •  Omega-3 Fatty Acids (Fish Oil) 1200 MG CAPS, , Disp: , Rfl:   •  omeprazole (PriLOSEC) 40 MG capsule, TAKE 1 CAPSULE DAILY, Disp: 90 capsule, Rfl: 1  •  Rhubarb (ESTROVEN MENOPAUSE RELIEF PO), Take 1 tablet by mouth daily, Disp: , Rfl:   •  tiZANidine (ZANAFLEX) 4 mg tablet, Take 1 tablet (4 mg total) by mouth daily at bedtime, Disp: 30 tablet, Rfl: 2  •  traZODone (DESYREL) 50 mg tablet, Take 1 tablet (50 mg total) by mouth daily at bedtime, Disp: 30 tablet, Rfl: 1  •  TURMERIC PO, Take 500 mg by mouth daily, Disp: , Rfl:   •  Alcohol Swabs (Alcohol Prep) PADS, Use in the morning, Disp: 100 each, Rfl: 3  •  Blood Glucose Monitoring Suppl (ONE TOUCH ULTRA 2) w/Device KIT, Test once daily, Disp: 1 kit, Rfl: 0  •  EPINEPHrine (EPIPEN) 0.3 mg/0.3 mL SOAJ, Inject 0.3 mL (0.3 mg total) into a muscle once for 1 dose, Disp: 0.6 mL, Rfl: 0  •  glucose blood (OneTouch Ultra) test strip, Test once daily, Disp: 100 each, Rfl: 3  •  Lancets (onetouch ultrasoft) lancets, Test once daily, Disp: 100 each, Rfl: 3  •  lisinopril (ZESTRIL) 10 mg tablet, Take 1 tablet (10 mg total) by mouth daily  "(Patient not taking: Reported on 4/4/2024), Disp: 90 tablet, Rfl: 1  •  Symbicort 160-4.5 MCG/ACT inhaler, INHALE 2 PUFFS BY MOUTH 2 TIMES A DAY RINSE MOUTH AFTER USE. (Patient not taking: Reported on 4/4/2024), Disp: 30.6 g, Rfl: 2    Allergies   Allergen Reactions   • Shellfish-Derivedproducts [Shellfish-Derived Products - Food Allergy] Anaphylaxis   • Phenytoin Hives   • Shellfish Allergy - Food Allergy Other (See Comments)       Physical Exam:    /78 (BP Location: Left arm, Patient Position: Sitting, Cuff Size: Standard)   Pulse 82   Temp (!) 97.2 °F (36.2 °C)   Ht 5' 2.25\" (1.581 m)   Wt 68 kg (150 lb)   BMI 27.22 kg/m²     Constitutional:normal, well developed, well nourished, alert, in no distress and non-toxic and no overt pain behavior.  Eyes:anicteric  HEENT:grossly intact  Neck:supple, symmetric, trachea midline and no masses   Pulmonary:even and unlabored  Cardiovascular:No edema or pitting edema present  Skin:Normal without rashes or lesions and well hydrated  Psychiatric:Mood and affect appropriate  Neurologic:Cranial Nerves II-XII grossly intact  Musculoskeletal: Lumbar scar from prior surgery well-healed, limited range of motion of the lumbar spine, gait is stable without the use of assistive devices      Imaging  MRI lumbar spine with and without contrast    (Results Pending)         Orders Placed This Encounter   Procedures   • MRI lumbar spine with and without contrast       "

## 2024-04-09 LAB
ALBUMIN SERPL-MCNC: 4.5 G/DL (ref 3.9–4.9)
ALBUMIN/CREAT UR: 11 MG/G CREAT (ref 0–29)
ALBUMIN/GLOB SERPL: 1.9 {RATIO} (ref 1.2–2.2)
ALP SERPL-CCNC: 96 IU/L (ref 44–121)
ALT SERPL-CCNC: 30 IU/L (ref 0–32)
AST SERPL-CCNC: 20 IU/L (ref 0–40)
BILIRUB SERPL-MCNC: 0.5 MG/DL (ref 0–1.2)
BUN SERPL-MCNC: 25 MG/DL (ref 8–27)
BUN/CREAT SERPL: 32 (ref 12–28)
CALCIUM SERPL-MCNC: 9.9 MG/DL (ref 8.7–10.3)
CHLORIDE SERPL-SCNC: 107 MMOL/L (ref 96–106)
CHOLEST SERPL-MCNC: 160 MG/DL (ref 100–199)
CO2 SERPL-SCNC: 20 MMOL/L (ref 20–29)
CREAT SERPL-MCNC: 0.78 MG/DL (ref 0.57–1)
CREAT UR-MCNC: 99.9 MG/DL
EGFR: 84 ML/MIN/1.73
EST. AVERAGE GLUCOSE BLD GHB EST-MCNC: 148 MG/DL
GLOBULIN SER-MCNC: 2.4 G/DL (ref 1.5–4.5)
GLUCOSE SERPL-MCNC: 119 MG/DL (ref 70–99)
HBA1C MFR BLD: 6.8 % (ref 4.8–5.6)
HDLC SERPL-MCNC: 47 MG/DL
LDLC SERPL CALC-MCNC: 84 MG/DL (ref 0–99)
LDLC/HDLC SERPL: 1.8 RATIO (ref 0–3.2)
MICROALBUMIN UR-MCNC: 10.7 UG/ML
POTASSIUM SERPL-SCNC: 4.5 MMOL/L (ref 3.5–5.2)
PROT SERPL-MCNC: 6.9 G/DL (ref 6–8.5)
SL AMB VLDL CHOLESTEROL CALC: 29 MG/DL (ref 5–40)
SODIUM SERPL-SCNC: 141 MMOL/L (ref 134–144)
TRIGL SERPL-MCNC: 169 MG/DL (ref 0–149)

## 2024-04-11 ENCOUNTER — HOSPITAL ENCOUNTER (OUTPATIENT)
Dept: ULTRASOUND IMAGING | Facility: CLINIC | Age: 64
Discharge: HOME | End: 2024-04-11
Payer: MEDICARE

## 2024-04-11 DIAGNOSIS — Z13.6 ENCOUNTER FOR ABDOMINAL AORTIC ANEURYSM (AAA) SCREENING: ICD-10-CM

## 2024-04-11 DIAGNOSIS — Z00.00 WELCOME TO MEDICARE PREVENTIVE VISIT: ICD-10-CM

## 2024-04-11 PROCEDURE — 76706 US ABDL AORTA SCREEN AAA: CPT

## 2024-04-12 ENCOUNTER — TELEPHONE (OUTPATIENT)
Dept: FAMILY MEDICINE CLINIC | Facility: CLINIC | Age: 64
End: 2024-04-12

## 2024-04-12 DIAGNOSIS — Z00.00 WELCOME TO MEDICARE PREVENTIVE VISIT: Primary | ICD-10-CM

## 2024-04-12 NOTE — TELEPHONE ENCOUNTER
Patient called.  She asked if you could order an EKG for her.  She said it is part of the welcome to medicare.  I told her I would have to send a message to you to see if you can order one for her.  I can call her back.  [phone]  Thank you.

## 2024-04-16 ENCOUNTER — HOSPITAL ENCOUNTER (OUTPATIENT)
Dept: MRI IMAGING | Facility: HOSPITAL | Age: 64
Discharge: HOME | End: 2024-04-16
Payer: MEDICARE

## 2024-04-16 DIAGNOSIS — M54.16 LUMBAR RADICULOPATHY: ICD-10-CM

## 2024-04-16 DIAGNOSIS — M96.1 LUMBAR POSTLAMINECTOMY SYNDROME: ICD-10-CM

## 2024-04-16 PROCEDURE — 72158 MRI LUMBAR SPINE W/O & W/DYE: CPT

## 2024-04-16 PROCEDURE — A9585 GADOBUTROL INJECTION: HCPCS | Performed by: PHYSICIAN ASSISTANT

## 2024-04-16 RX ORDER — GADOBUTROL 604.72 MG/ML
6 INJECTION INTRAVENOUS
Status: COMPLETED | OUTPATIENT
Start: 2024-04-16 | End: 2024-04-16

## 2024-04-16 RX ADMIN — GADOBUTROL 6 ML: 604.72 INJECTION INTRAVENOUS at 16:32

## 2024-04-19 DIAGNOSIS — F51.01 PRIMARY INSOMNIA: ICD-10-CM

## 2024-04-19 RX ORDER — TRAZODONE HYDROCHLORIDE 50 MG/1
50 TABLET ORAL
Qty: 90 TABLET | Refills: 1 | Status: SHIPPED | OUTPATIENT
Start: 2024-04-19

## 2024-04-23 ENCOUNTER — TELEPHONE (OUTPATIENT)
Dept: PAIN MEDICINE | Facility: CLINIC | Age: 64
End: 2024-04-23

## 2024-04-23 NOTE — TELEPHONE ENCOUNTER
----- Message from Flor Boston PA-C sent at 4/23/2024  3:54 PM EDT -----  Please let patient know her lumbar spine MRI shows severe arthritis at L5-S1.  Stable fusion.  No significant stenosis.   Options are PT, gentle chiropractic therapy, medication and injections.

## 2024-04-24 ENCOUNTER — OFFICE VISIT (OUTPATIENT)
Dept: PAIN MEDICINE | Facility: CLINIC | Age: 64
End: 2024-04-24
Payer: MEDICARE

## 2024-04-24 VITALS
SYSTOLIC BLOOD PRESSURE: 128 MMHG | WEIGHT: 147 LBS | DIASTOLIC BLOOD PRESSURE: 82 MMHG | HEIGHT: 62 IN | BODY MASS INDEX: 27.05 KG/M2 | HEART RATE: 91 BPM | TEMPERATURE: 97.6 F

## 2024-04-24 DIAGNOSIS — M47.812 CERVICAL SPONDYLOSIS: ICD-10-CM

## 2024-04-24 DIAGNOSIS — M54.2 NECK PAIN: Primary | ICD-10-CM

## 2024-04-24 DIAGNOSIS — M96.1 LUMBAR POSTLAMINECTOMY SYNDROME: ICD-10-CM

## 2024-04-24 DIAGNOSIS — M79.18 MYOFASCIAL PAIN SYNDROME: ICD-10-CM

## 2024-04-24 DIAGNOSIS — K43.9 ABDOMINAL WALL HERNIA: ICD-10-CM

## 2024-04-24 DIAGNOSIS — M47.816 LUMBAR SPONDYLOSIS: ICD-10-CM

## 2024-04-24 PROCEDURE — G2211 COMPLEX E/M VISIT ADD ON: HCPCS | Performed by: PHYSICIAN ASSISTANT

## 2024-04-24 PROCEDURE — 99214 OFFICE O/P EST MOD 30 MIN: CPT | Performed by: PHYSICIAN ASSISTANT

## 2024-04-24 RX ORDER — CELECOXIB 200 MG/1
200 CAPSULE ORAL DAILY
Qty: 30 CAPSULE | Refills: 2 | Status: SHIPPED | OUTPATIENT
Start: 2024-04-24

## 2024-04-24 NOTE — PROGRESS NOTES
Assessment:  1. Neck pain    2. Cervical spondylosis    3. Lumbar spondylosis    4. Lumbar postlaminectomy syndrome    5. Myofascial pain syndrome    6. Abdominal wall hernia        Plan:  While the patient was in the office today, I did have a thorough conversation regarding their chronic pain syndrome, medication management, and treatment plan options.    Lumbar spine MRI reviewed with the patient today.  Stable lumbar fusion from L3-5.  Moderate to severe bilateral facet arthropathy at L5-S1.  There is no significant central or foraminal stenosis.  After discussing options, I have recommended the patient continue with her home exercise program as tolerated.  She is working on lumbar and cervical stretching exercises as well as aquatic therapy.  Overall injections have been of minimal benefit.    Consider gentle chiropractic care with active myofascial release techniques.  I have provided the patient with Dr. Wing Ricketts's information.    Consider repeat sacroiliac to injections in the future.  I would like to avoid injections at least for the time being as she is working on blood sugar control.    We will try Celebrex 200 mg daily for chronic pain/inflammation.  I told the patient she may take over-the-counter acetaminophen as needed, not to exceed 3000 mg in a 24-hour period.  I told the patient she is not to take over-the-counter NSAIDs while taking Celebrex.    I placed a referral for the patient to see her general surgeon regarding abdominal wall hernia seen on the lumbar spine MRI.    The patient will follow-up in 12 weeks for medication prescription refill and reevaluation. The patient was advised to contact the office should their symptoms worsen in the interim. The patient was agreeable and verbalized an understanding.        History of Present Illness:    The patient is a 63 y.o. female last seen on 4/4/2024 who presents for a follow up office visit in regards to chronic neck and low back pain  secondary to postlaminectomy pain syndromes and spondylosis.  The patient currently reports low back pain with referred pain patterns in the bilateral hips.  She still reports intermittent radiation along the lateral aspects of both legs.  Patient is also reporting increased tightness and pain in the left trapezius muscle since doing some certain pool exercises.  She underwent the MRI lumbar spine and presents today to review that.  She did report temporary improvement with SI joint injections however there was blood sugar elevation.  She is working on getting her hemoglobin A1c down again.  Patient finds partial relief with over-the-counter acetaminophen and ibuprofen.  Patient does not take the tizanidine too often because she states it caused leg cramps.    I have personally reviewed and/or updated the patient's past medical history, past surgical history, family history, social history, current medications, allergies, and vital signs today.       Review of Systems:    Review of Systems   Respiratory:  Positive for shortness of breath.    Cardiovascular:  Negative for chest pain.   Gastrointestinal:  Negative for constipation, diarrhea, nausea and vomiting.   Musculoskeletal:  Positive for gait problem. Negative for arthralgias, joint swelling (Joint stiffness) and myalgias.   Skin:  Negative for rash.   Neurological:  Positive for weakness. Negative for dizziness and seizures.   All other systems reviewed and are negative.        Past Medical History:   Diagnosis Date   • Anemia 2008    Due to heavy menstruation   • Arthritis    • Asthma 2005    Seasonal   • Diabetes mellitus (HCC) 2006   • GERD (gastroesophageal reflux disease) 2008    Could be due to gastroparesis   • Hyperlipidemia    • Hypertension 2005   • Pneumonia 2018    Walking pneumonia   • TIA (transient ischemic attack)        Past Surgical History:   Procedure Laterality Date   • ADENOIDECTOMY  1965   • BACK SURGERY      cervical and lumbar   •  CHOLECYSTECTOMY     • COLONOSCOPY     • CT BONE MARROW BIOPSY AND ASPIRATION  2017   • VA BIOPSY SOFT TISSUE BACK/FLANK DEEP N/A 2024    Procedure: EXCISION MASS BACK;  Surgeon: Ottoniel Lake MD;  Location: [facility];  Service: General   • SPINE SURGERY  ,  and     ACDF , Laminectomy  and fusion    • TONSILLECTOMY  1965   • TUBAL LIGATION         Family History   Problem Relation Age of Onset   • Cancer Mother         Unknown   • Clotting disorder Mother         Factor XI deficiency   • Coronary artery disease Father          at 52 years   • Diabetes Father    • Hypertension Father    • Diabetes type II Father    • Breast cancer Sister         50's   • Cancer Sister         Breast cancer twice   • COPD Sister    • No Known Problems Daughter    • No Known Problems Daughter    • No Known Problems Daughter    • No Known Problems Daughter    • No Known Problems Maternal Grandmother    • No Known Problems Maternal Grandfather    • No Known Problems Paternal Grandmother    • No Known Problems Paternal Grandfather    • No Known Problems Maternal Aunt    • Breast cancer Paternal Aunt         60's   • Cancer Paternal Aunt         Breast cancer   • Diabetes Paternal Aunt    • Hypertension Paternal Aunt    • Coronary artery disease Brother         Congestive Heart Failure   • Heart failure Brother    • Hypertension Brother    • Heart failure Paternal Uncle    • Hypertension Paternal Uncle    • Heart failure Paternal Uncle    • Hypertension Paternal Uncle    • Diabetes type II Family         Daughter   • Hypothyroidism Family         Daughter       Social History     Occupational History   • Not on file   Tobacco Use   • Smoking status: Former     Current packs/day: 0.00     Average packs/day: 0.5 packs/day for 2.0 years (1.0 ttl pk-yrs)     Types: Cigarettes     Start date: 1983     Quit date: 1985     Years since quittin.3     Passive exposure: Past   • Smokeless  tobacco: Never   • Tobacco comments:      years ago   Vaping Use   • Vaping status: Never Used   Substance and Sexual Activity   • Alcohol use: Yes     Comment: Occasional   • Drug use: Never   • Sexual activity: Not Currently     Partners: Male     Birth control/protection: Post-menopausal, Female Sterilization         Current Outpatient Medications:   •  acetaminophen (TYLENOL) 650 mg CR tablet, Take 1,300 mg by mouth 2 (two) times a day, Disp: , Rfl:   •  atorvastatin (LIPITOR) 40 mg tablet, TAKE 1 TABLET DAILY, Disp: 90 tablet, Rfl: 0  •  b complex vitamins capsule, Take 1 capsule by mouth daily, Disp: , Rfl:   •  BIOTIN BEAUTY EXTRA STRENGTH PO, Take by mouth, Disp: , Rfl:   •  celecoxib (CeleBREX) 200 mg capsule, Take 1 capsule (200 mg total) by mouth daily, Disp: 30 capsule, Rfl: 2  •  Empagliflozin-linaGLIPtin (Glyxambi) 25-5 MG TABS, Take 1 tab daily, Disp: 90 tablet, Rfl: 1  •  glimepiride (AMARYL) 4 mg tablet, 1 tab bid., Disp: 180 tablet, Rfl: 1  •  ibuprofen (MOTRIN) 800 mg tablet, Take 1 tablet (800 mg total) by mouth every 8 (eight) hours as needed for mild pain As needed, Disp: 60 tablet, Rfl: 1  •  metFORMIN (GLUCOPHAGE) 1000 MG tablet, Take 1 tablet (1,000 mg total) by mouth 2 (two) times a day with meals, Disp: 180 tablet, Rfl: 3  •  Multiple Vitamin (MULTIVITAMIN ADULT PO), Take 1 tablet by mouth daily, Disp: , Rfl:   •  Omega-3 Fatty Acids (Fish Oil) 1200 MG CAPS, , Disp: , Rfl:   •  omeprazole (PriLOSEC) 40 MG capsule, TAKE 1 CAPSULE DAILY, Disp: 90 capsule, Rfl: 1  •  Rhubarb (ESTROVEN MENOPAUSE RELIEF PO), Take 1 tablet by mouth daily, Disp: , Rfl:   •  tiZANidine (ZANAFLEX) 4 mg tablet, Take 1 tablet (4 mg total) by mouth daily at bedtime, Disp: 30 tablet, Rfl: 2  •  traZODone (DESYREL) 50 mg tablet, TAKE 1 TABLET BY MOUTH DAILY AT BEDTIME, Disp: 90 tablet, Rfl: 1  •  TURMERIC PO, Take 500 mg by mouth daily, Disp: , Rfl:   •  albuterol (ProAir HFA) 90 mcg/act inhaler, Inhale 2 puffs every 6  "(six) hours as needed for wheezing, Disp: 18 g, Rfl: 1  •  Alcohol Swabs (Alcohol Prep) PADS, Use in the morning, Disp: 100 each, Rfl: 3  •  Blood Glucose Monitoring Suppl (ONE TOUCH ULTRA 2) w/Device KIT, Test once daily, Disp: 1 kit, Rfl: 0  •  EPINEPHrine (EPIPEN) 0.3 mg/0.3 mL SOAJ, Inject 0.3 mL (0.3 mg total) into a muscle once for 1 dose, Disp: 0.6 mL, Rfl: 0  •  glucose blood (OneTouch Ultra) test strip, Test once daily, Disp: 100 each, Rfl: 3  •  Lancets (onetouch ultrasoft) lancets, Test once daily, Disp: 100 each, Rfl: 3  •  lisinopril (ZESTRIL) 10 mg tablet, Take 1 tablet (10 mg total) by mouth daily (Patient not taking: Reported on 4/4/2024), Disp: 90 tablet, Rfl: 1  •  Symbicort 160-4.5 MCG/ACT inhaler, INHALE 2 PUFFS BY MOUTH 2 TIMES A DAY RINSE MOUTH AFTER USE. (Patient not taking: Reported on 4/4/2024), Disp: 30.6 g, Rfl: 2    Allergies   Allergen Reactions   • Shellfish-Derivedproducts [Shellfish-Derived Products - Food Allergy] Anaphylaxis   • Phenytoin Hives   • Shellfish Allergy - Food Allergy Other (See Comments)       Physical Exam:    /82 (BP Location: Left arm, Patient Position: Sitting, Cuff Size: Standard)   Pulse 91   Temp 97.6 °F (36.4 °C)   Ht 5' 2.25\" (1.581 m)   Wt 66.7 kg (147 lb)   BMI 26.67 kg/m²     Constitutional:normal, well developed, well nourished, alert, in no distress and non-toxic and no overt pain behavior.  Eyes:anicteric  HEENT:grossly intact  Neck:supple, symmetric, trachea midline and no masses   Pulmonary:even and unlabored  Cardiovascular:No edema or pitting edema present  Skin:Normal without rashes or lesions and well hydrated  Psychiatric:Mood and affect appropriate  Neurologic:Cranial Nerves II-XII grossly intact  Musculoskeletal: Gait is slow but stable, limited range of motion lumbar spine, tenderness palpation over the left trapezius muscle and left suboccipital region.      Imaging  MRI LUMBAR SPINE WITH AND WITHOUT CONTRAST     INDICATION: M54.16: " Radiculopathy, lumbar region  M96.1: Postlaminectomy syndrome, not elsewhere classified.     COMPARISON: X-ray lumbar spine dated 10/23.     TECHNIQUE:  Multiplanar, multisequence imaging of the lumbar spine was performed before and after gadolinium administration. .        IV Contrast:  6 mL of Gadobutrol injection (SINGLE-DOSE)     IMAGE QUALITY:  Diagnostic     FINDINGS:     VERTEBRAL BODIES:  There are 5 lumbar type vertebral bodies. There is levoscoliosis with apex at L2-3. Mild retrolisthesis at L2-3 and L5-S1.        Redemonstrated postsurgical change from prior decompressive laminectomy, posterior instrumented fusion with interbody spacer at levels L3-L5. Normal bone marrow signal no suspicious discrete marrow lesion.     SACRUM:  Normal signal within the sacrum. No of insufficiency or stress fracture.     DISTAL CORD AND CONUS:  Normal size and signal within the distal cord and conus.     PARASPINAL SOFT TISSUES:  Paraspinal soft tissues are unremarkable.     LOWER THORACIC DISC SPACES: Unremarkable     LUMBAR DISC SPACES:     L1-L2: Mild disc bulge. Small left foraminal disc protrusion. Mild facet arthropathy. Mild canal narrowing. Minor bilateral foraminal narrowing.     L2-L3: Disc bulge. Moderate facet arthropathy. Mild canal stenosis. Mild right foraminal narrowing.     L3-L4: Postsurgical change. Bilateral facet arthropathy. Surgically patent canal. Mild right foraminal narrowing.     L4-L5: Postsurgical change. Mild facet arthropathy. Surgically patent canal. No significant foraminal stenosis.     L5-S1: Left foraminal disc bulge and marginal osteophytes. Moderate to severe bilateral facet arthropathy. Mild left lateral recess narrowing without significant canal stenosis. Mild left foraminal narrowing.     POSTCONTRAST IMAGING:  No abnormal enhancement.     OTHER FINDINGS: Fat-containing dorsal abdominal wall hernia (series 2 images 4-5).     IMPRESSION:     1.  Prior posterior decompression with  instrumented fusion at levels L3-L5.  2.  Mild lumbar degenerative change as detailed.        No orders to display         Orders Placed This Encounter   Procedures   • Ambulatory Referral to General Surgery

## 2024-04-25 NOTE — PROGRESS NOTES
"Assessment/Plan:   Jada Hui is a 63 y.o.female who is here for evaluation of abdominal wall lump.  Has been present for some time.  Recent ultrasound suggesting lipoma at the site.  Patient interested in removal.  Patient known to our office who recently underwent excision of lipoma back in February of this year.    Abdominal wall lipoma  -US reviewed, abdominal wall abnormality is a 5.9 x 0.9 x 4.6 cm oval well-circumscribed lesion likely representing a lipoma  Will plan for excision of mass to be done in the operating room.  Procedure discussed in detail with the patient as well as possible risks and complications and written consent has been obtained.  All questions answered    DM@, HTN, HLD, TIA        Plan: {MPMSkinLesionPlan:38593}    Preoperative Clearance: {MPMMedicalCardiac:71644}    HPI:  Jada Hui is a 63 y.o.female who was referred for evaluation of No chief complaint on file.  .    Currently patient reports ***.   Reports: {Skin lesion symptoms:92042::\"not changing\"}    ROS:  General ROS: negative  negative for - chills, fatigue, fever or night sweats, weight loss  Respiratory ROS: no cough, shortness of breath, or wheezing  Cardiovascular ROS: no chest pain or dyspnea on exertion  Abdomen ROS: no pain, N/V  Genito-Urinary ROS: no dysuria, trouble voiding, or hematuria  Musculoskeletal ROS: negative for - gait disturbance, joint pain or muscle pain  Neurological ROS: no TIA or stroke symptoms  Skin ROS: See HPI    ALLERGIES  Shellfish-derivedproducts [shellfish-derived products - food allergy], Phenytoin, and Shellfish allergy - food allergy    Current Outpatient Medications:     acetaminophen (TYLENOL) 650 mg CR tablet, Take 1,300 mg by mouth 2 (two) times a day, Disp: , Rfl:     albuterol (ProAir HFA) 90 mcg/act inhaler, Inhale 2 puffs every 6 (six) hours as needed for wheezing, Disp: 18 g, Rfl: 1    Alcohol Swabs (Alcohol Prep) PADS, Use in the morning, Disp: 100 each, Rfl: 3    atorvastatin " (LIPITOR) 40 mg tablet, TAKE 1 TABLET DAILY, Disp: 90 tablet, Rfl: 0    b complex vitamins capsule, Take 1 capsule by mouth daily, Disp: , Rfl:     BIOTIN BEAUTY EXTRA STRENGTH PO, Take by mouth, Disp: , Rfl:     Blood Glucose Monitoring Suppl (ONE TOUCH ULTRA 2) w/Device KIT, Test once daily, Disp: 1 kit, Rfl: 0    celecoxib (CeleBREX) 200 mg capsule, Take 1 capsule (200 mg total) by mouth daily, Disp: 30 capsule, Rfl: 2    Empagliflozin-linaGLIPtin (Glyxambi) 25-5 MG TABS, Take 1 tab daily, Disp: 90 tablet, Rfl: 1    EPINEPHrine (EPIPEN) 0.3 mg/0.3 mL SOAJ, Inject 0.3 mL (0.3 mg total) into a muscle once for 1 dose, Disp: 0.6 mL, Rfl: 0    glimepiride (AMARYL) 4 mg tablet, 1 tab bid., Disp: 180 tablet, Rfl: 1    glucose blood (OneTouch Ultra) test strip, Test once daily, Disp: 100 each, Rfl: 3    ibuprofen (MOTRIN) 800 mg tablet, Take 1 tablet (800 mg total) by mouth every 8 (eight) hours as needed for mild pain As needed, Disp: 60 tablet, Rfl: 1    Lancets (onetouch ultrasoft) lancets, Test once daily, Disp: 100 each, Rfl: 3    lisinopril (ZESTRIL) 10 mg tablet, Take 1 tablet (10 mg total) by mouth daily (Patient not taking: Reported on 4/4/2024), Disp: 90 tablet, Rfl: 1    metFORMIN (GLUCOPHAGE) 1000 MG tablet, Take 1 tablet (1,000 mg total) by mouth 2 (two) times a day with meals, Disp: 180 tablet, Rfl: 3    Multiple Vitamin (MULTIVITAMIN ADULT PO), Take 1 tablet by mouth daily, Disp: , Rfl:     Omega-3 Fatty Acids (Fish Oil) 1200 MG CAPS, , Disp: , Rfl:     omeprazole (PriLOSEC) 40 MG capsule, TAKE 1 CAPSULE DAILY, Disp: 90 capsule, Rfl: 1    Rhubarb (ESTROVEN MENOPAUSE RELIEF PO), Take 1 tablet by mouth daily, Disp: , Rfl:     Symbicort 160-4.5 MCG/ACT inhaler, INHALE 2 PUFFS BY MOUTH 2 TIMES A DAY RINSE MOUTH AFTER USE. (Patient not taking: Reported on 4/4/2024), Disp: 30.6 g, Rfl: 2    tiZANidine (ZANAFLEX) 4 mg tablet, Take 1 tablet (4 mg total) by mouth daily at bedtime, Disp: 30 tablet, Rfl: 2     traZODone (DESYREL) 50 mg tablet, TAKE 1 TABLET BY MOUTH DAILY AT BEDTIME, Disp: 90 tablet, Rfl: 1    TURMERIC PO, Take 500 mg by mouth daily, Disp: , Rfl:   Past Medical History:   Diagnosis Date    Anemia     Due to heavy menstruation    Arthritis     Asthma 2005    Seasonal    Diabetes mellitus (HCC)     GERD (gastroesophageal reflux disease)     Could be due to gastroparesis    Hyperlipidemia     Hypertension 2005    Pneumonia 2018    Walking pneumonia    TIA (transient ischemic attack)      Past Surgical History:   Procedure Laterality Date    ADENOIDECTOMY  1965    BACK SURGERY      cervical and lumbar    CHOLECYSTECTOMY      COLONOSCOPY      CT BONE MARROW BIOPSY AND ASPIRATION  2017    CT BIOPSY SOFT TISSUE BACK/FLANK DEEP N/A 2024    Procedure: EXCISION MASS BACK;  Surgeon: Ottoniel Lake MD;  Location:  MAIN OR;  Service: General    SPINE SURGERY  ,  and     ACDF , Laminectomy  and fusion     TONSILLECTOMY  1965    TUBAL LIGATION       Family History   Problem Relation Age of Onset    Cancer Mother         Unknown    Clotting disorder Mother         Factor XI deficiency    Coronary artery disease Father          at 52 years    Diabetes Father     Hypertension Father     Diabetes type II Father     Breast cancer Sister         50's    Cancer Sister         Breast cancer twice    COPD Sister     No Known Problems Daughter     No Known Problems Daughter     No Known Problems Daughter     No Known Problems Daughter     No Known Problems Maternal Grandmother     No Known Problems Maternal Grandfather     No Known Problems Paternal Grandmother     No Known Problems Paternal Grandfather     No Known Problems Maternal Aunt     Breast cancer Paternal Aunt         60's    Cancer Paternal Aunt         Breast cancer    Diabetes Paternal Aunt     Hypertension Paternal Aunt     Coronary artery disease Brother         Congestive Heart Failure    Heart failure  Brother     Hypertension Brother     Heart failure Paternal Uncle     Hypertension Paternal Uncle     Heart failure Paternal Uncle     Hypertension Paternal Uncle     Diabetes type II Family         Daughter    Hypothyroidism Family         Daughter      reports that she quit smoking about 39 years ago. Her smoking use included cigarettes. She started smoking about 41 years ago. She has a 1 pack-year smoking history. She has been exposed to tobacco smoke. She has never used smokeless tobacco. She reports current alcohol use. She reports that she does not use drugs.    PHYSICAL EXAM  General Appearance:    Alert, cooperative, no distress, ***   Head:    Normocephalic without obvious abnormality   Eyes:    PERRL, conjunctiva/corneas clear, EOM's intact        Neck:   Supple, no adenopathy, no JVD   Back:     Symmetric, no spinal or CVA tenderness   Lungs:     Clear to auscultation bilaterally, no wheezing or rhonchi   Heart:    Regular rate and rhythm, S1 and S2 normal, no murmur   Abdomen:     Benign, no rebound or guarding.    Extremities:   Extremities normal. No clubbing, cyanosis or edema   Psych:   Normal Affect, AOx3.    Neurologic:  Skin:   CNII-XII intact. Strength symmetric, speech intact    Warm, dry, intact, no visible rashes or lesions except as follows: ***           Physical Exam

## 2024-04-25 NOTE — TELEPHONE ENCOUNTER
S/w pt, stated that she has started a medication as discussed at her ov with MG yesterday. Advised pt to cb if there are any questions or issues. Pt verbalized understanding and appreciation.

## 2024-04-25 NOTE — TELEPHONE ENCOUNTER
Caller: jake Elias    Doctor: Abigail    Reason for call: pt asking to speak with a nurse    Call back#: call transferred to the nurse

## 2024-04-26 ENCOUNTER — CONSULT (OUTPATIENT)
Dept: SURGERY | Facility: HOSPITAL | Age: 64
End: 2024-04-26
Payer: MEDICARE

## 2024-04-26 VITALS
DIASTOLIC BLOOD PRESSURE: 86 MMHG | SYSTOLIC BLOOD PRESSURE: 147 MMHG | WEIGHT: 152.2 LBS | HEIGHT: 63 IN | HEART RATE: 90 BPM | BODY MASS INDEX: 26.97 KG/M2

## 2024-04-26 DIAGNOSIS — K42.9 UMBILICAL HERNIA WITHOUT OBSTRUCTION AND WITHOUT GANGRENE: ICD-10-CM

## 2024-04-26 DIAGNOSIS — K43.9 ABDOMINAL WALL HERNIA: Primary | ICD-10-CM

## 2024-04-26 PROCEDURE — 99213 OFFICE O/P EST LOW 20 MIN: CPT | Performed by: PHYSICIAN ASSISTANT

## 2024-05-05 DIAGNOSIS — E78.5 HYPERLIPIDEMIA, UNSPECIFIED HYPERLIPIDEMIA TYPE: ICD-10-CM

## 2024-05-05 DIAGNOSIS — K21.00 GASTROESOPHAGEAL REFLUX DISEASE WITH ESOPHAGITIS, UNSPECIFIED WHETHER HEMORRHAGE: ICD-10-CM

## 2024-05-05 DIAGNOSIS — E11.42 TYPE 2 DIABETES MELLITUS WITH DIABETIC POLYNEUROPATHY, WITHOUT LONG-TERM CURRENT USE OF INSULIN (HCC): ICD-10-CM

## 2024-05-05 RX ORDER — OMEPRAZOLE 40 MG/1
40 CAPSULE, DELAYED RELEASE ORAL DAILY
Qty: 90 CAPSULE | Refills: 1 | Status: SHIPPED | OUTPATIENT
Start: 2024-05-05

## 2024-05-05 RX ORDER — GLIMEPIRIDE 4 MG/1
TABLET ORAL
Qty: 180 TABLET | Refills: 1 | Status: SHIPPED | OUTPATIENT
Start: 2024-05-05

## 2024-05-05 RX ORDER — ATORVASTATIN CALCIUM 40 MG/1
TABLET, FILM COATED ORAL
Qty: 90 TABLET | Refills: 1 | Status: SHIPPED | OUTPATIENT
Start: 2024-05-05

## 2024-05-15 ENCOUNTER — OFFICE VISIT (OUTPATIENT)
Dept: ENDOCRINOLOGY | Facility: CLINIC | Age: 64
End: 2024-05-15
Payer: MEDICARE

## 2024-05-15 VITALS
HEIGHT: 62 IN | DIASTOLIC BLOOD PRESSURE: 70 MMHG | OXYGEN SATURATION: 99 % | SYSTOLIC BLOOD PRESSURE: 110 MMHG | WEIGHT: 152.6 LBS | BODY MASS INDEX: 28.08 KG/M2 | HEART RATE: 64 BPM

## 2024-05-15 DIAGNOSIS — E11.42 TYPE 2 DIABETES MELLITUS WITH DIABETIC POLYNEUROPATHY, WITHOUT LONG-TERM CURRENT USE OF INSULIN (HCC): Primary | ICD-10-CM

## 2024-05-15 DIAGNOSIS — E78.5 HYPERLIPIDEMIA, UNSPECIFIED HYPERLIPIDEMIA TYPE: ICD-10-CM

## 2024-05-15 DIAGNOSIS — I10 HYPERTENSION, UNSPECIFIED TYPE: ICD-10-CM

## 2024-05-15 PROCEDURE — 99214 OFFICE O/P EST MOD 30 MIN: CPT | Performed by: PHYSICIAN ASSISTANT

## 2024-05-15 NOTE — PROGRESS NOTES
Established Patient Progress Note    Chief Complaint:  Diabetes follow up visit    Impression & Plan:    Problem List Items Addressed This Visit        Cardiovascular and Mediastinum    Hypertension     This is well controlled, she is off lisinopril due to low BP.             Endocrine    Type 2 diabetes mellitus with diabetic polyneuropathy, without long-term current use of insulin ([facility]) - Primary     Diabetes control has improved.  If she develops hypoglycemia when resuming exercise advised her to let us know so glimepiride dose can be reduced.   Lab Results   Component Value Date    HGBA1C 6.8 (H) 04/08/2024          Relevant Orders    Hemoglobin A1C    Basic metabolic panel       Other    Hyperlipidemia     Continue atorvastatin.               History of Present Illness:   Jada Hui is a 64 y.o. female with a history of type 2 diabetes  since about 15 years ago. Reports complications of none.    Blood sugars a little higher past month due to birthdays, cake, etc  Now, doing better with diet.   Hasn't exercised, plans to get back to that  No hypoglycemia.   Most readings   Had a reading of 57 about 1 month ago    Eye Exam 10/2023  Foot Exam Today     Current regimen:   Metformin 1000mg BID  Glimepiride 4mg BID  Glyzambi 25/5 daily     Has hypertension: Taking no meds-- has been off lisinopril due to low BP.   Has hyperlipidemia: Taking atorvastatin        Patient Active Problem List   Diagnosis   • Asthma   • Type 2 diabetes mellitus with diabetic polyneuropathy, without long-term current use of insulin ([facility])   • Family history of anesthesia complication   • Gastroparesis   • History of seizure   • Hyperlipidemia   • Hypertension   • Lumbar stenosis   • Cervical post-laminectomy syndrome   • 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HCC)   • Gastro-esophageal reflux disease with esophagitis   • Paroxysmal supraventricular tachycardia   • Pain in left ankle and joints of left foot   • Environmental allergies    • Hip pain, right   • Sacroiliitis (HCC)   • Mass of skin of back      Past Medical History:   Diagnosis Date   • Anemia     Due to heavy menstruation   • Arthritis    • Asthma     Seasonal   • Diabetes mellitus (HCC)    • GERD (gastroesophageal reflux disease)     Could be due to gastroparesis   • Hyperlipidemia    • Hypertension 2005   • Pneumonia 2018    Walking pneumonia   • TIA (transient ischemic attack)       Past Surgical History:   Procedure Laterality Date   • ADENOIDECTOMY  1965   • BACK SURGERY      cervical and lumbar   • CHOLECYSTECTOMY     • COLONOSCOPY     • CT BONE MARROW BIOPSY AND ASPIRATION  2017   • [place] BIOPSY SOFT TISSUE BACK/FLANK DEEP N/A 2024    Procedure: EXCISION MASS BACK;  Surgeon: Ottoniel Lake MD;  Location:  MAIN OR;  Service: General   • SPINE SURGERY  ,  and     ACDF , Laminectomy  and fusion    • TONSILLECTOMY  1965   • TUBAL LIGATION        Family History   Problem Relation Age of Onset   • Cancer Mother         Unknown   • Clotting disorder Mother         Factor XI deficiency   • Coronary artery disease Father          at 52 years   • Diabetes Father    • Hypertension Father    • Diabetes type II Father    • Breast cancer Sister         50's   • Cancer Sister         Breast cancer twice   • COPD Sister    • No Known Problems Daughter    • No Known Problems Daughter    • No Known Problems Daughter    • No Known Problems Daughter    • No Known Problems Maternal Grandmother    • No Known Problems Maternal Grandfather    • No Known Problems Paternal Grandmother    • No Known Problems Paternal Grandfather    • No Known Problems Maternal Aunt    • Breast cancer Paternal Aunt         60's   • Cancer Paternal Aunt         Breast cancer   • Diabetes Paternal Aunt    • Hypertension Paternal Aunt    • Coronary artery disease Brother         Congestive Heart Failure   • Heart failure Brother    • Hypertension Brother    •  Heart failure Paternal Uncle    • Hypertension Paternal Uncle    • Heart failure Paternal Uncle    • Hypertension Paternal Uncle    • Diabetes type II Family         Daughter   • Hypothyroidism Family         Daughter     Social History     Tobacco Use   • Smoking status: Former     Current packs/day: 0.00     Average packs/day: 0.5 packs/day for 2.0 years (1.0 ttl pk-yrs)     Types: Cigarettes     Start date: 1983     Quit date: 1985     Years since quittin.3     Passive exposure: Past   • Smokeless tobacco: Never   • Tobacco comments:      years ago   Substance Use Topics   • Alcohol use: Yes     Comment: Occasional     Allergies   Allergen Reactions   • Shellfish-Derivedproducts [Shellfish-Derived Products - Food Allergy] Anaphylaxis   • Phenytoin Hives   • Shellfish Allergy - Food Allergy Other (See Comments)         Current Outpatient Medications:   •  acetaminophen (TYLENOL) 650 mg CR tablet, Take 1,300 mg by mouth 2 (two) times a day, Disp: , Rfl:   •  albuterol (ProAir HFA) 90 mcg/act inhaler, Inhale 2 puffs every 6 (six) hours as needed for wheezing, Disp: 18 g, Rfl: 1  •  Alcohol Swabs (Alcohol Prep) PADS, Use in the morning, Disp: 100 each, Rfl: 3  •  atorvastatin (LIPITOR) 40 mg tablet, TAKE 1 TABLET DAILY, Disp: 90 tablet, Rfl: 1  •  b complex vitamins capsule, Take 1 capsule by mouth daily, Disp: , Rfl:   •  BIOTIN BEAUTY EXTRA STRENGTH PO, Take by mouth, Disp: , Rfl:   •  Blood Glucose Monitoring Suppl (ONE TOUCH ULTRA 2) w/Device KIT, Test once daily, Disp: 1 kit, Rfl: 0  •  celecoxib (CeleBREX) 200 mg capsule, Take 1 capsule (200 mg total) by mouth daily, Disp: 30 capsule, Rfl: 2  •  Empagliflozin-linaGLIPtin (Glyxambi) 25-5 MG TABS, Take 1 tab daily, Disp: 90 tablet, Rfl: 1  •  EPINEPHrine (EPIPEN) 0.3 mg/0.3 mL SOAJ, Inject 0.3 mL (0.3 mg total) into a muscle once for 1 dose, Disp: 0.6 mL, Rfl: 0  •  glimepiride (AMARYL) 4 mg tablet, TAKE 1 TABLET TWICE A DAY, Disp: 180 tablet, Rfl:  1  •  glucose blood (OneTouch Ultra) test strip, Test once daily, Disp: 100 each, Rfl: 3  •  ibuprofen (MOTRIN) 800 mg tablet, Take 1 tablet (800 mg total) by mouth every 8 (eight) hours as needed for mild pain As needed, Disp: 60 tablet, Rfl: 1  •  Lancets (onetouch ultrasoft) lancets, Test once daily, Disp: 100 each, Rfl: 3  •  MAGNESIUM OXIDE 400 PO, Take by mouth, Disp: , Rfl:   •  metFORMIN (GLUCOPHAGE) 1000 MG tablet, Take 1 tablet (1,000 mg total) by mouth 2 (two) times a day with meals, Disp: 180 tablet, Rfl: 3  •  Multiple Vitamin (MULTIVITAMIN ADULT PO), Take 1 tablet by mouth daily, Disp: , Rfl:   •  Omega-3 Fatty Acids (Fish Oil) 1200 MG CAPS, , Disp: , Rfl:   •  omeprazole (PriLOSEC) 40 MG capsule, TAKE 1 CAPSULE DAILY, Disp: 90 capsule, Rfl: 1  •  Rhubarb (ESTROVEN MENOPAUSE RELIEF PO), Take 1 tablet by mouth daily, Disp: , Rfl:   •  tiZANidine (ZANAFLEX) 4 mg tablet, Take 1 tablet (4 mg total) by mouth daily at bedtime, Disp: 30 tablet, Rfl: 2  •  traZODone (DESYREL) 50 mg tablet, TAKE 1 TABLET BY MOUTH DAILY AT BEDTIME, Disp: 90 tablet, Rfl: 1  •  TURMERIC PO, Take 500 mg by mouth daily, Disp: , Rfl:   •  Symbicort 160-4.5 MCG/ACT inhaler, INHALE 2 PUFFS BY MOUTH 2 TIMES A DAY RINSE MOUTH AFTER USE. (Patient not taking: Reported on 4/4/2024), Disp: 30.6 g, Rfl: 2    Review of Systems   Constitutional:  Negative for activity change, appetite change, chills, diaphoresis, fatigue, fever and unexpected weight change.   HENT:  Negative for trouble swallowing and voice change.    Eyes:  Negative for visual disturbance.   Respiratory:  Negative for shortness of breath.    Cardiovascular:  Negative for chest pain and palpitations.   Gastrointestinal:  Negative for abdominal pain, constipation and diarrhea.   Endocrine: Negative for cold intolerance, heat intolerance, polydipsia, polyphagia and polyuria.   Genitourinary:  Negative for frequency and menstrual problem.   Musculoskeletal:  Negative for  "arthralgias and myalgias.   Skin:  Negative for rash.   Allergic/Immunologic: Negative for food allergies.   Neurological:  Negative for dizziness and tremors.   Hematological:  Negative for adenopathy.   Psychiatric/Behavioral:  Negative for sleep disturbance.    All other systems reviewed and are negative.      Physical Exam:  Body mass index is 27.91 kg/m².  /70   Pulse 64   Ht 5' 2\" (1.575 m)   Wt 69.2 kg (152 lb 9.6 oz)   SpO2 99%   BMI 27.91 kg/m²    Wt Readings from Last 3 Encounters:   05/15/24 69.2 kg (152 lb 9.6 oz)   04/26/24 69 kg (152 lb 3.2 oz)   04/24/24 66.7 kg (147 lb)       Physical Exam  Vitals reviewed.   Constitutional:       General: She is not in acute distress.     Appearance: Normal appearance. She is well-developed. She is not toxic-appearing.   HENT:      Head: Normocephalic and atraumatic.   Eyes:      Conjunctiva/sclera: Conjunctivae normal.      Pupils: Pupils are equal, round, and reactive to light.   Neck:      Thyroid: No thyromegaly.   Cardiovascular:      Rate and Rhythm: Normal rate and regular rhythm.      Pulses: no weak pulses.           Dorsalis pedis pulses are 2+ on the right side and 2+ on the left side.        Posterior tibial pulses are 2+ on the right side and 2+ on the left side.      Heart sounds: Normal heart sounds.   Pulmonary:      Effort: Pulmonary effort is normal. No respiratory distress.      Breath sounds: Normal breath sounds. No wheezing or rales.   Abdominal:      General: Bowel sounds are normal. There is no distension.      Palpations: Abdomen is soft.      Tenderness: There is no abdominal tenderness.   Musculoskeletal:         General: Normal range of motion.      Cervical back: Normal range of motion and neck supple.   Feet:      Right foot:      Skin integrity: No ulcer, skin breakdown, erythema, warmth, callus or dry skin.      Left foot:      Skin integrity: No ulcer, skin breakdown, erythema, warmth, callus or dry skin.   Skin:     " General: Skin is warm and dry.   Neurological:      Mental Status: She is alert and oriented to person, place, and time.   Psychiatric:         Mood and Affect: Mood normal.         Behavior: Behavior normal.       Patient's shoes and socks removed.    Right Foot/Ankle   Right Foot Inspection  Skin Exam: skin normal and skin intact. No dry skin, no warmth, no callus, no erythema, no maceration, no abnormal color, no pre-ulcer, no ulcer and no callus.     Toe Exam: ROM and strength within normal limits. No swelling, no tenderness, erythema and  no right toe deformity    Sensory   Monofilament testing: intact    Vascular  Capillary refills: < 3 seconds  The right DP pulse is 2+. The right PT pulse is 2+.     Left Foot/Ankle  Left Foot Inspection  Skin Exam: skin normal and skin intact. No dry skin, no warmth, no erythema, no maceration, normal color, no pre-ulcer, no ulcer and no callus.     Toe Exam: ROM and strength within normal limits. No swelling, no tenderness, no erythema and no left toe deformity.     Sensory   Monofilament testing: intact    Vascular  Capillary refills: < 3 seconds  The left DP pulse is 2+. The left PT pulse is 2+.     Assign Risk Category  No deformity present  No loss of protective sensation  No weak pulses  Risk: 0      Labs:   Component      Latest Ref Rng 9/23/2023 4/8/2024   GLUCOSE      70 - 99 mg/dL  119 (H)    BUN      8 - 27 mg/dL  25    Creatinine      0.57 - 1.00 mg/dL  0.78    GFR, Calculated      >59 mL/min/1.73  84    SL AMB BUN/CREATININE RATIO      12 - 28   32 (H)    Sodium      134 - 144 mmol/L  141    Potassium      3.5 - 5.2 mmol/L  4.5    Chloride      96 - 106 mmol/L  107 (H)    Carbon Dioxide      20 - 29 mmol/L  20    CALCIUM      8.7 - 10.3 mg/dL  9.9    Total Protein      6.0 - 8.5 g/dL  6.9    Albumin      3.9 - 4.9 g/dL  4.5    Globulin, Total      1.5 - 4.5 g/dL  2.4    Albumin/Globulin Ratio      1.2 - 2.2   1.9    Total Bilirubin      0.0 - 1.2 mg/dL  0.5     ALKALINE PHOSPHATASE ISOENZYMES      44 - 121 IU/L  96    AST      0 - 40 IU/L  20    ALT      0 - 32 IU/L  30    Cholesterol      100 - 199 mg/dL  160    Triglycerides      0 - 149 mg/dL  169 (H)    HDL      >39 mg/dL  47    VLDL Cholesterol Tray      5 - 40 mg/dL  29    LDL Calculated      0 - 99 mg/dL  84    LDL/HDL RATIO      0.0 - 3.2 ratio  1.8    EXT Creatinine Urine      Not Estab. mg/dL  99.9    Albumin,U,Random      Not Estab. ug/mL  10.7    Albumin Creat Ratio      0 - 29 mg/g creat  11    Hemoglobin A1C      4.8 - 5.6 %  6.8 (H)    eAG, EST AVG Glucose      mg/dL  148    TSH 3RD GENERATON      0.450 - 4.500 uIU/mL 2.455        Legend:  (H) High    Discussed with the patient and all questioned fully answered. She will call me if any problems arise.    Follow-up appointment in 4 months.     Counseled patient on diagnostic results, prognosis, risk and benefit of treatment options, instruction for management, importance of treatment compliance, Risk  factor reduction and impressions    Lit Ramsey PA-C

## 2024-05-15 NOTE — ASSESSMENT & PLAN NOTE
Diabetes control has improved.  If she develops hypoglycemia when resuming exercise advised her to let us know so glimepiride dose can be reduced.   Lab Results   Component Value Date    HGBA1C 6.8 (H) 04/08/2024

## 2024-06-11 DIAGNOSIS — E11.42 TYPE 2 DIABETES MELLITUS WITH DIABETIC POLYNEUROPATHY, WITHOUT LONG-TERM CURRENT USE OF INSULIN (HCC): ICD-10-CM

## 2024-07-06 DIAGNOSIS — E11.42 TYPE 2 DIABETES MELLITUS WITH DIABETIC POLYNEUROPATHY, WITHOUT LONG-TERM CURRENT USE OF INSULIN (HCC): ICD-10-CM

## 2024-07-06 RX ORDER — BLOOD SUGAR DIAGNOSTIC
STRIP MISCELLANEOUS
Qty: 100 STRIP | Refills: 3 | Status: SHIPPED | OUTPATIENT
Start: 2024-07-06

## 2024-07-10 DIAGNOSIS — E11.42 TYPE 2 DIABETES MELLITUS WITH DIABETIC POLYNEUROPATHY, WITHOUT LONG-TERM CURRENT USE OF INSULIN (HCC): ICD-10-CM

## 2024-07-10 RX ORDER — EMPAGLIFLOZIN AND LINAGLIPTIN 25; 5 MG/1; MG/1
TABLET, FILM COATED ORAL
Qty: 90 TABLET | Refills: 1 | Status: SHIPPED | OUTPATIENT
Start: 2024-07-10

## 2024-07-11 ENCOUNTER — OFFICE VISIT (OUTPATIENT)
Dept: PAIN MEDICINE | Facility: CLINIC | Age: 64
End: 2024-07-11
Payer: MEDICARE

## 2024-07-11 VITALS
TEMPERATURE: 98.6 F | DIASTOLIC BLOOD PRESSURE: 78 MMHG | HEART RATE: 94 BPM | SYSTOLIC BLOOD PRESSURE: 117 MMHG | BODY MASS INDEX: 27.62 KG/M2 | WEIGHT: 151 LBS

## 2024-07-11 DIAGNOSIS — M54.16 LUMBAR RADICULOPATHY: ICD-10-CM

## 2024-07-11 DIAGNOSIS — M47.816 LUMBAR SPONDYLOSIS: ICD-10-CM

## 2024-07-11 DIAGNOSIS — Z98.1 HISTORY OF LUMBAR FUSION: ICD-10-CM

## 2024-07-11 DIAGNOSIS — M46.1 SACROILIITIS (HCC): Primary | ICD-10-CM

## 2024-07-11 PROCEDURE — 99214 OFFICE O/P EST MOD 30 MIN: CPT | Performed by: PHYSICIAN ASSISTANT

## 2024-07-11 PROCEDURE — G2211 COMPLEX E/M VISIT ADD ON: HCPCS | Performed by: PHYSICIAN ASSISTANT

## 2024-07-11 RX ORDER — MELOXICAM 15 MG/1
15 TABLET ORAL DAILY
Qty: 30 TABLET | Refills: 2 | Status: SHIPPED | OUTPATIENT
Start: 2024-07-11

## 2024-07-11 RX ORDER — GABAPENTIN 300 MG/1
300 CAPSULE ORAL
Qty: 30 CAPSULE | Refills: 2 | Status: SHIPPED | OUTPATIENT
Start: 2024-07-11

## 2024-07-11 NOTE — PROGRESS NOTES
Assessment:  1. Sacroiliitis (HCC)    2. History of lumbar fusion    3. Lumbar spondylosis    4. Lumbar radiculopathy        Plan:  While the patient was in the office today, I did have a thorough conversation regarding their chronic pain syndrome, medication management, and treatment plan options.    Based on patient report and physical exam, the patient's symptomatology does seem to be consistent with sacroiliac mediated pain from sacroiliitis. We will schedule the patient for a bilateral SIJ injection to decrease any inflammatory component of the patient's pain symptoms.    Restart gabapentin 300 mg nightly.  Discontinue Celebrex due to cost and replace with meloxicam 15 mg daily for pain and inflammation.  Prescriptions electronically sent to her pharmacy with 2 additional refills.    The patient was advised to contact the office should their symptoms worsen in the interim. The patient was agreeable and verbalized an understanding.        History of Present Illness:    The patient is a 64 y.o. female last seen on 4/24/24 who presents for a follow up office visit in regards to chronic pain secondary to lumbar spondylosis, sacroiliitis, lumbar degenerative disc disease, lumbar postlaminectomy pain syndrome/history of lumbar fusion.  The patient currently reports increased low back pain right greater than left that she presently rates a 5 out of 10.  Patient describes the pain as an intermittent dull, aching, cramping type of pain.  She has referred pain patterns into the right hip and right groin area.  Her pain is made worse with prolonged sitting, prolonged standing and the sit to stand motion.  She is having difficulty sleeping at night due to the amount of pain.  Patient is taking Celebrex 200 mg daily, and she is unsure of the effectiveness and also states it is not covered by her insurance.  She continues to perform her own home exercise program and lately has been doing aquatic therapy in her pool.    I  have personally reviewed and/or updated the patient's past medical history, past surgical history, family history, social history, current medications, allergies, and vital signs today.       Review of Systems:    Review of Systems      Past Medical History:   Diagnosis Date   • Anemia     Due to heavy menstruation   • Arthritis    • Asthma     Seasonal   • Diabetes mellitus (HCC)    • GERD (gastroesophageal reflux disease)     Could be due to gastroparesis   • Hyperlipidemia    • Hypertension    • Pneumonia     Walking pneumonia   • TIA (transient ischemic attack)        Past Surgical History:   Procedure Laterality Date   • ADENOIDECTOMY     • BACK SURGERY      cervical and lumbar   • CHOLECYSTECTOMY     • COLONOSCOPY     • [place] BIOPSY SOFT TISSUE BACK/FLANK DEEP N/A 2024    Procedure: EXCISION MASS BACK;  Surgeon: Ottoniel Lake MD;  Location:  MAIN OR;  Service: General   • SPINE SURGERY  ,  and     ACDF , Laminectomy  and fusion    • TONSILLECTOMY     • TUBAL LIGATION         Family History   Problem Relation Age of Onset   • Cancer Mother         Unknown   • Clotting disorder Mother         Factor XI deficiency   • Coronary artery disease Father          at 52 years   • Diabetes Father    • Hypertension Father    • Diabetes type II Father    • Breast cancer Sister         50's   • Cancer Sister         Breast cancer twice   • COPD Sister    • No Known Problems Daughter    • No Known Problems Daughter    • No Known Problems Daughter    • No Known Problems Daughter    • No Known Problems Maternal Grandmother    • No Known Problems Maternal Grandfather    • No Known Problems Paternal Grandmother    • No Known Problems Paternal Grandfather    • No Known Problems Maternal Aunt    • Breast cancer Paternal Aunt         60's   • Cancer Paternal Aunt         Breast cancer   • Diabetes Paternal Aunt    • Hypertension Paternal Aunt    • Coronary  artery disease Brother         Congestive Heart Failure   • Heart failure Brother    • Hypertension Brother    • Heart failure Paternal Uncle    • Hypertension Paternal Uncle    • Heart failure Paternal Uncle    • Hypertension Paternal Uncle    • Diabetes type II Family         Daughter   • Hypothyroidism Family         Daughter       Social History     Occupational History   • Not on file   Tobacco Use   • Smoking status: Former     Current packs/day: 0.00     Average packs/day: 0.5 packs/day for 2.0 years (1.0 ttl pk-yrs)     Types: Cigarettes     Start date: 1983     Quit date: 1985     Years since quittin.5     Passive exposure: Past   • Smokeless tobacco: Never   • Tobacco comments:      years ago   Vaping Use   • Vaping status: Never Used   Substance and Sexual Activity   • Alcohol use: Yes     Comment: Occasional   • Drug use: Never   • Sexual activity: Not Currently     Partners: Male     Birth control/protection: Post-menopausal, Female Sterilization         Current Outpatient Medications:   •  acetaminophen (TYLENOL) 650 mg CR tablet, Take 1,300 mg by mouth 2 (two) times a day, Disp: , Rfl:   •  albuterol (ProAir HFA) 90 mcg/act inhaler, Inhale 2 puffs every 6 (six) hours as needed for wheezing, Disp: 18 g, Rfl: 1  •  Alcohol Swabs (Alcohol Prep) PADS, Use in the morning, Disp: 100 each, Rfl: 3  •  atorvastatin (LIPITOR) 40 mg tablet, TAKE 1 TABLET DAILY, Disp: 90 tablet, Rfl: 1  •  b complex vitamins capsule, Take 1 capsule by mouth daily, Disp: , Rfl:   •  BIOTIN BEAUTY EXTRA STRENGTH PO, Take by mouth, Disp: , Rfl:   •  Blood Glucose Monitoring Suppl (ONE TOUCH ULTRA 2) w/Device KIT, Test once daily, Disp: 1 kit, Rfl: 0  •  Empagliflozin-linaGLIPtin (Glyxambi) 25-5 MG TABS, TAKE 1 TABLET DAILY, Disp: 90 tablet, Rfl: 1  •  gabapentin (NEURONTIN) 300 mg capsule, Take 1 capsule (300 mg total) by mouth daily at bedtime, Disp: 30 capsule, Rfl: 2  •  glimepiride (AMARYL) 4 mg tablet, TAKE 1  TABLET TWICE A DAY, Disp: 180 tablet, Rfl: 1  •  glucose blood (OneTouch Ultra) test strip, USE TO TEST ONCE DAILY, Disp: 100 strip, Rfl: 3  •  ibuprofen (MOTRIN) 800 mg tablet, Take 1 tablet (800 mg total) by mouth every 8 (eight) hours as needed for mild pain As needed, Disp: 60 tablet, Rfl: 1  •  Lancets (onetouch ultrasoft) lancets, Test once daily, Disp: 100 each, Rfl: 3  •  MAGNESIUM OXIDE 400 PO, Take by mouth, Disp: , Rfl:   •  meloxicam (Mobic) 15 mg tablet, Take 1 tablet (15 mg total) by mouth daily, Disp: 30 tablet, Rfl: 2  •  metFORMIN (GLUCOPHAGE) 1000 MG tablet, TAKE 1 TABLET TWICE DAILY  WITH MEALS, Disp: 180 tablet, Rfl: 3  •  Multiple Vitamin (MULTIVITAMIN ADULT PO), Take 1 tablet by mouth daily, Disp: , Rfl:   •  Omega-3 Fatty Acids (Fish Oil) 1200 MG CAPS, , Disp: , Rfl:   •  omeprazole (PriLOSEC) 40 MG capsule, TAKE 1 CAPSULE DAILY, Disp: 90 capsule, Rfl: 1  •  Rhubarb (ESTROVEN MENOPAUSE RELIEF PO), Take 1 tablet by mouth daily, Disp: , Rfl:   •  tiZANidine (ZANAFLEX) 4 mg tablet, Take 1 tablet (4 mg total) by mouth daily at bedtime, Disp: 30 tablet, Rfl: 2  •  traZODone (DESYREL) 50 mg tablet, TAKE 1 TABLET BY MOUTH DAILY AT BEDTIME, Disp: 90 tablet, Rfl: 1  •  TURMERIC PO, Take 500 mg by mouth daily, Disp: , Rfl:   •  EPINEPHrine (EPIPEN) 0.3 mg/0.3 mL SOAJ, Inject 0.3 mL (0.3 mg total) into a muscle once for 1 dose, Disp: 0.6 mL, Rfl: 0  •  Symbicort 160-4.5 MCG/ACT inhaler, INHALE 2 PUFFS BY MOUTH 2 TIMES A DAY RINSE MOUTH AFTER USE. (Patient not taking: Reported on 4/4/2024), Disp: 30.6 g, Rfl: 2    Allergies   Allergen Reactions   • Shellfish-Derivedproducts [Shellfish-Derived Products - Food Allergy] Anaphylaxis   • Phenytoin Hives   • Shellfish Allergy - Food Allergy Other (See Comments)       Physical Exam:    /78   Pulse 94   Temp 98.6 °F (37 °C)   Wt 68.5 kg (151 lb)   BMI 27.62 kg/m²     Constitutional:normal, well developed, well nourished, alert, in no distress and  non-toxic and no overt pain behavior.  Eyes:anicteric  HEENT:grossly intact  Neck:supple, symmetric, trachea midline and no masses   Pulmonary:even and unlabored  Cardiovascular:No edema or pitting edema present  Skin:Normal without rashes or lesions and well hydrated  Psychiatric:Mood and affect appropriate  Neurologic:Cranial Nerves II-XII grossly intact  Musculoskeletal: Lumbar scars from prior surgeries, tenderness to palpation along the left lumbar paraspinal musculature, bilateral SI joint tenderness, + Melisa finger sign + Patricks test +Gaenslen's test+ Posterior pelvic pain provocation      Imaging  FL spine and pain procedure    (Results Pending)         Orders Placed This Encounter   Procedures   • FL spine and pain procedure

## 2024-07-30 DIAGNOSIS — J45.20 MILD INTERMITTENT ASTHMA, UNSPECIFIED WHETHER COMPLICATED: ICD-10-CM

## 2024-07-30 RX ORDER — EPINEPHRINE 0.3 MG/.3ML
INJECTION SUBCUTANEOUS
Qty: 2 EACH | Refills: 0 | Status: SHIPPED | OUTPATIENT
Start: 2024-07-30

## 2024-08-19 ENCOUNTER — HOSPITAL ENCOUNTER (OUTPATIENT)
Dept: RADIOLOGY | Facility: CLINIC | Age: 64
Discharge: HOME | End: 2024-08-19
Payer: MEDICARE

## 2024-08-19 VITALS
TEMPERATURE: 97.8 F | OXYGEN SATURATION: 97 % | DIASTOLIC BLOOD PRESSURE: 76 MMHG | HEART RATE: 84 BPM | RESPIRATION RATE: 16 BRPM | SYSTOLIC BLOOD PRESSURE: 148 MMHG

## 2024-08-19 DIAGNOSIS — M46.1 SACROILIITIS (HCC): ICD-10-CM

## 2024-08-19 PROCEDURE — 27096 INJECT SACROILIAC JOINT: CPT | Performed by: ANESTHESIOLOGY

## 2024-08-19 RX ORDER — ROPIVACAINE HYDROCHLORIDE 2 MG/ML
2 INJECTION, SOLUTION EPIDURAL; INFILTRATION; PERINEURAL ONCE
Status: COMPLETED | OUTPATIENT
Start: 2024-08-19 | End: 2024-08-19

## 2024-08-19 RX ORDER — METHYLPREDNISOLONE ACETATE 80 MG/ML
80 INJECTION, SUSPENSION INTRA-ARTICULAR; INTRALESIONAL; INTRAMUSCULAR; PARENTERAL; SOFT TISSUE ONCE
Status: COMPLETED | OUTPATIENT
Start: 2024-08-19 | End: 2024-08-19

## 2024-08-19 RX ADMIN — METHYLPREDNISOLONE ACETATE 80 MG: 80 INJECTION, SUSPENSION INTRA-ARTICULAR; INTRALESIONAL; INTRAMUSCULAR; SOFT TISSUE at 08:54

## 2024-08-19 RX ADMIN — ROPIVACAINE HYDROCHLORIDE 2 ML: 2 INJECTION EPIDURAL; INFILTRATION; PERINEURAL at 08:54

## 2024-08-19 RX ADMIN — IOHEXOL 0.4 ML: 300 INJECTION, SOLUTION INTRAVENOUS at 08:54

## 2024-08-19 NOTE — H&P
History of Present Illness: The patient is a 64 y.o. female who presents with complaints of back pain.    Past Medical History:   Diagnosis Date    Anemia 2008    Due to heavy menstruation    Arthritis     Asthma 2005    Seasonal    Diabetes mellitus (HCC) 2006    GERD (gastroesophageal reflux disease) 2008    Could be due to gastroparesis    Hyperlipidemia     Hypertension 2005    Pneumonia 2018    Walking pneumonia    TIA (transient ischemic attack)        Past Surgical History:   Procedure Laterality Date    ADENOIDECTOMY  1965    BACK SURGERY      cervical and lumbar    CHOLECYSTECTOMY  2006    COLONOSCOPY      DE BIOPSY SOFT TISSUE BACK/FLANK DEEP N/A 2/7/2024    Procedure: EXCISION MASS BACK;  Surgeon: Ottoniel Lake MD;  Location:  MAIN OR;  Service: General    SPINE SURGERY  2018, 2019 and 2021    ACDF 2018, Laminectomy 2019 and fusion 2021    TONSILLECTOMY  1965    TUBAL LIGATION           Current Outpatient Medications:     acetaminophen (TYLENOL) 650 mg CR tablet, Take 1,300 mg by mouth 2 (two) times a day, Disp: , Rfl:     albuterol (ProAir HFA) 90 mcg/act inhaler, Inhale 2 puffs every 6 (six) hours as needed for wheezing, Disp: 18 g, Rfl: 1    Alcohol Swabs (Alcohol Prep) PADS, Use in the morning, Disp: 100 each, Rfl: 3    atorvastatin (LIPITOR) 40 mg tablet, TAKE 1 TABLET DAILY, Disp: 90 tablet, Rfl: 1    b complex vitamins capsule, Take 1 capsule by mouth daily, Disp: , Rfl:     BIOTIN BEAUTY EXTRA STRENGTH PO, Take by mouth, Disp: , Rfl:     Blood Glucose Monitoring Suppl (ONE TOUCH ULTRA 2) w/Device KIT, Test once daily, Disp: 1 kit, Rfl: 0    Empagliflozin-linaGLIPtin (Glyxambi) 25-5 MG TABS, TAKE 1 TABLET DAILY, Disp: 90 tablet, Rfl: 1    EPINEPHrine (EPIPEN) 0.3 mg/0.3 mL SOAJ, INJECT 0.3 ML INTO A MUSCLE ONCE FOR 1 DOSE, Disp: 2 each, Rfl: 0    gabapentin (NEURONTIN) 300 mg capsule, Take 1 capsule (300 mg total) by mouth daily at bedtime, Disp: 30 capsule, Rfl: 2    glimepiride (AMARYL) 4  mg tablet, TAKE 1 TABLET TWICE A DAY, Disp: 180 tablet, Rfl: 1    glucose blood (OneTouch Ultra) test strip, USE TO TEST ONCE DAILY, Disp: 100 strip, Rfl: 3    ibuprofen (MOTRIN) 800 mg tablet, Take 1 tablet (800 mg total) by mouth every 8 (eight) hours as needed for mild pain As needed, Disp: 60 tablet, Rfl: 1    Lancets (onetouch ultrasoft) lancets, Test once daily, Disp: 100 each, Rfl: 3    MAGNESIUM OXIDE 400 PO, Take by mouth, Disp: , Rfl:     meloxicam (Mobic) 15 mg tablet, Take 1 tablet (15 mg total) by mouth daily, Disp: 30 tablet, Rfl: 2    metFORMIN (GLUCOPHAGE) 1000 MG tablet, TAKE 1 TABLET TWICE DAILY  WITH MEALS, Disp: 180 tablet, Rfl: 3    Multiple Vitamin (MULTIVITAMIN ADULT PO), Take 1 tablet by mouth daily, Disp: , Rfl:     Omega-3 Fatty Acids (Fish Oil) 1200 MG CAPS, , Disp: , Rfl:     omeprazole (PriLOSEC) 40 MG capsule, TAKE 1 CAPSULE DAILY, Disp: 90 capsule, Rfl: 1    Rhubarb (ESTROVEN MENOPAUSE RELIEF PO), Take 1 tablet by mouth daily, Disp: , Rfl:     Symbicort 160-4.5 MCG/ACT inhaler, INHALE 2 PUFFS BY MOUTH 2 TIMES A DAY RINSE MOUTH AFTER USE. (Patient not taking: Reported on 4/4/2024), Disp: 30.6 g, Rfl: 2    tiZANidine (ZANAFLEX) 4 mg tablet, Take 1 tablet (4 mg total) by mouth daily at bedtime, Disp: 30 tablet, Rfl: 2    traZODone (DESYREL) 50 mg tablet, TAKE 1 TABLET BY MOUTH DAILY AT BEDTIME, Disp: 90 tablet, Rfl: 1    TURMERIC PO, Take 500 mg by mouth daily, Disp: , Rfl:     Current Facility-Administered Medications:     iohexol (OMNIPAQUE) 300 mg/mL injection 0.4 mL, 0.4 mL, Intra-articular, Once, Simeon Hayes DO    methylPREDNISolone acetate (DEPO-MEDROL) injection 80 mg, 80 mg, Intra-articular, Once, Simeon Hayes DO    ropivacaine (NAROPIN) injection 2 mL, 2 mL, Intra-articular, Once, Simeon Hayes DO    Allergies   Allergen Reactions    Shellfish-Derivedproducts [Shellfish-Derived Products - Food Allergy] Anaphylaxis    Phenytoin Hives    Shellfish Allergy - Food Allergy Other (See  Comments)       Physical Exam:   Vitals:    08/19/24 0846   BP: 139/81   Pulse: 83   Resp: 16   Temp: 97.8 °F (36.6 °C)   SpO2: 95%     General: Awake, Alert, Oriented x 3, Mood and affect appropriate  Respiratory: Respirations even and unlabored  Cardiovascular: Peripheral pulses intact; no edema  Musculoskeletal Exam: Normal gait    ASA Score: II    Patient/Chart Verification  Patient ID Verified: Verbal  ID Band Applied: No  Consents Confirmed: Procedural, To be obtained in the Pre-Procedure area  Interval H&P(within 24 hr) Complete (required for Outpatients and Surgery Admit only): To be obtained in the Pre-Procedure area  Allergies Reviewed: Yes  Anticoag/NSAID held?: NA  Currently on antibiotics?: No  Pregnancy denied?: NA    Assessment:   1. Sacroiliitis (HCC)        Plan: B/L SIJ

## 2024-08-19 NOTE — DISCHARGE INSTRUCTIONS

## 2024-09-03 ENCOUNTER — TELEPHONE (OUTPATIENT)
Dept: PAIN MEDICINE | Facility: MEDICAL CENTER | Age: 64
End: 2024-09-03

## 2024-09-03 NOTE — TELEPHONE ENCOUNTER
Patient reports 70-75% improvement post inj  Pain level 2-3/10     B/L SIJ inj 8/19, NO FU        Patient was inquiring regarding her follow up appt. Will forward clinical staff to contact patient to schedule.

## 2024-09-17 ENCOUNTER — OFFICE VISIT (OUTPATIENT)
Dept: PAIN MEDICINE | Facility: CLINIC | Age: 64
End: 2024-09-17
Payer: MEDICARE

## 2024-09-17 VITALS
HEART RATE: 94 BPM | TEMPERATURE: 97.9 F | DIASTOLIC BLOOD PRESSURE: 72 MMHG | WEIGHT: 152 LBS | SYSTOLIC BLOOD PRESSURE: 118 MMHG | BODY MASS INDEX: 27.97 KG/M2 | HEIGHT: 62 IN

## 2024-09-17 DIAGNOSIS — M96.1 LUMBAR POSTLAMINECTOMY SYNDROME: Primary | ICD-10-CM

## 2024-09-17 DIAGNOSIS — M25.551 RIGHT HIP PAIN: ICD-10-CM

## 2024-09-17 DIAGNOSIS — M79.18 MYOFASCIAL PAIN SYNDROME: ICD-10-CM

## 2024-09-17 DIAGNOSIS — M54.16 LUMBAR RADICULOPATHY: ICD-10-CM

## 2024-09-17 DIAGNOSIS — M46.1 SACROILIITIS (HCC): ICD-10-CM

## 2024-09-17 DIAGNOSIS — M54.9 MID BACK PAIN: ICD-10-CM

## 2024-09-17 DIAGNOSIS — Z98.1 HISTORY OF LUMBAR SPINAL FUSION: ICD-10-CM

## 2024-09-17 PROCEDURE — G2211 COMPLEX E/M VISIT ADD ON: HCPCS | Performed by: PHYSICIAN ASSISTANT

## 2024-09-17 PROCEDURE — 99214 OFFICE O/P EST MOD 30 MIN: CPT | Performed by: PHYSICIAN ASSISTANT

## 2024-09-17 NOTE — PROGRESS NOTES
Assessment:  1. Lumbar postlaminectomy syndrome    2. History of lumbar spinal fusion    3. Sacroiliitis (HCC)    4. Lumbar radiculopathy    5. Mid back pain    6. Right hip pain    7. Myofascial pain syndrome        Plan:  While the patient was in the office today, I did have a thorough conversation regarding their chronic pain syndrome, medication management, and treatment plan options.    The patient is able to report approximately 70% reduction in pain following SI joint injections.  Patient was made aware that the injection can be repeated, waiting 3 months ideally.    At this point in time I will refer the patient back to physical therapy.  I have placed the referral on today's visit.  She is looking for guidance on core strengthening in particular.    The patient will follow-up in 12 weeks for medication prescription refill and reevaluation. The patient was advised to contact the office should their symptoms worsen in the interim. The patient was agreeable and verbalized an understanding.        History of Present Illness:    The patient is a 64 y.o. female last seen on 8/19/2024 who presents for a follow up office visit in regards to chronic low back pain secondary to lumbar spondylosis, sacroiliitis, lumbar postlaminectomy pain syndrome/history of lumbar fusion.  The patient currently reports low back pain that she rates a 4 out of 10 and describes it as a constant sharp and shooting pain.  The pain is bilateral with radiation into the buttocks and right lateral lower extremity into the ankle.  She has intermittent numbness along this distribution of the leg as well.  On 8/19/2024 the patient underwent bilateral sacroiliac joint injections which has reduced 70 to 75% reduction in pain.  She is interested in restarting physical therapy in particular for stretching and core strengthening.    I have personally reviewed and/or updated the patient's past medical history, past surgical history, family history,  social history, current medications, allergies, and vital signs today.       Review of Systems:    Review of Systems   Respiratory:  Negative for shortness of breath.    Cardiovascular:  Negative for chest pain.   Gastrointestinal:  Negative for constipation, diarrhea, nausea and vomiting.   Musculoskeletal:  Negative for arthralgias, gait problem, joint swelling (Joint stiffness) and myalgias.   Skin:  Negative for rash.   Neurological:  Positive for weakness. Negative for dizziness and seizures.   All other systems reviewed and are negative.        Past Medical History:   Diagnosis Date    Anemia     Due to heavy menstruation    Arthritis     Asthma     Seasonal    Diabetes mellitus (HCC)     GERD (gastroesophageal reflux disease)     Could be due to gastroparesis    Hyperlipidemia     Hypertension 2005    Pneumonia 2018    Walking pneumonia    TIA (transient ischemic attack)        Past Surgical History:   Procedure Laterality Date    ADENOIDECTOMY  1965    BACK SURGERY      cervical and lumbar    CHOLECYSTECTOMY  2006    COLONOSCOPY      NH BIOPSY SOFT TISSUE BACK/FLANK DEEP N/A 2024    Procedure: EXCISION MASS BACK;  Surgeon: Ottoniel Lake MD;  Location:  MAIN OR;  Service: General    SPINE SURGERY  ,  and     ACDF , Laminectomy  and fusion     TONSILLECTOMY  1965    TUBAL LIGATION         Family History   Problem Relation Age of Onset    Cancer Mother         Unknown    Clotting disorder Mother         Factor XI deficiency    Coronary artery disease Father          at 52 years    Diabetes Father     Hypertension Father     Diabetes type II Father     Breast cancer Sister         50's    Cancer Sister         Breast cancer twice    COPD Sister     No Known Problems Daughter     No Known Problems Daughter     No Known Problems Daughter     No Known Problems Daughter     No Known Problems Maternal Grandmother     No Known Problems Maternal Grandfather      No Known Problems Paternal Grandmother     No Known Problems Paternal Grandfather     No Known Problems Maternal Aunt     Breast cancer Paternal Aunt         60's    Cancer Paternal Aunt         Breast cancer    Diabetes Paternal Aunt     Hypertension Paternal Aunt     Coronary artery disease Brother         Congestive Heart Failure    Heart failure Brother     Hypertension Brother     Heart failure Paternal Uncle     Hypertension Paternal Uncle     Heart failure Paternal Uncle     Hypertension Paternal Uncle     Diabetes type II Family         Daughter    Hypothyroidism Family         Daughter       Social History     Occupational History    Not on file   Tobacco Use    Smoking status: Former     Current packs/day: 0.00     Average packs/day: 0.5 packs/day for 2.0 years (1.0 ttl pk-yrs)     Types: Cigarettes     Start date: 1983     Quit date: 1985     Years since quittin.7     Passive exposure: Past    Smokeless tobacco: Never    Tobacco comments:      years ago   Vaping Use    Vaping status: Never Used   Substance and Sexual Activity    Alcohol use: Yes     Comment: Occasional    Drug use: Never    Sexual activity: Not Currently     Partners: Male     Birth control/protection: Post-menopausal, Female Sterilization         Current Outpatient Medications:     acetaminophen (TYLENOL) 650 mg CR tablet, Take 1,300 mg by mouth 2 (two) times a day, Disp: , Rfl:     albuterol (ProAir HFA) 90 mcg/act inhaler, Inhale 2 puffs every 6 (six) hours as needed for wheezing, Disp: 18 g, Rfl: 1    atorvastatin (LIPITOR) 40 mg tablet, TAKE 1 TABLET DAILY, Disp: 90 tablet, Rfl: 1    b complex vitamins capsule, Take 1 capsule by mouth daily, Disp: , Rfl:     BIOTIN BEAUTY EXTRA STRENGTH PO, Take by mouth, Disp: , Rfl:     Empagliflozin-linaGLIPtin (Glyxambi) 25-5 MG TABS, TAKE 1 TABLET DAILY, Disp: 90 tablet, Rfl: 1    glimepiride (AMARYL) 4 mg tablet, TAKE 1 TABLET TWICE A DAY, Disp: 180 tablet, Rfl: 1    meloxicam  (Mobic) 15 mg tablet, Take 1 tablet (15 mg total) by mouth daily, Disp: 30 tablet, Rfl: 2    metFORMIN (GLUCOPHAGE) 1000 MG tablet, TAKE 1 TABLET TWICE DAILY  WITH MEALS, Disp: 180 tablet, Rfl: 3    Multiple Vitamin (MULTIVITAMIN ADULT PO), Take 1 tablet by mouth daily, Disp: , Rfl:     Omega-3 Fatty Acids (Fish Oil) 1200 MG CAPS, , Disp: , Rfl:     omeprazole (PriLOSEC) 40 MG capsule, TAKE 1 CAPSULE DAILY, Disp: 90 capsule, Rfl: 1    Rhubarb (ESTROVEN MENOPAUSE RELIEF PO), Take 1 tablet by mouth daily, Disp: , Rfl:     TURMERIC PO, Take 500 mg by mouth daily, Disp: , Rfl:     Alcohol Swabs (Alcohol Prep) PADS, Use in the morning, Disp: 100 each, Rfl: 3    Blood Glucose Monitoring Suppl (ONE TOUCH ULTRA 2) w/Device KIT, Test once daily, Disp: 1 kit, Rfl: 0    EPINEPHrine (EPIPEN) 0.3 mg/0.3 mL SOAJ, INJECT 0.3 ML INTO A MUSCLE ONCE FOR 1 DOSE, Disp: 2 each, Rfl: 0    gabapentin (NEURONTIN) 300 mg capsule, Take 1 capsule (300 mg total) by mouth daily at bedtime (Patient not taking: Reported on 9/17/2024), Disp: 30 capsule, Rfl: 2    glucose blood (OneTouch Ultra) test strip, USE TO TEST ONCE DAILY, Disp: 100 strip, Rfl: 3    ibuprofen (MOTRIN) 800 mg tablet, Take 1 tablet (800 mg total) by mouth every 8 (eight) hours as needed for mild pain As needed (Patient not taking: Reported on 9/17/2024), Disp: 60 tablet, Rfl: 1    Lancets (onetouch ultrasoft) lancets, Test once daily, Disp: 100 each, Rfl: 3    MAGNESIUM OXIDE 400 PO, Take by mouth, Disp: , Rfl:     Symbicort 160-4.5 MCG/ACT inhaler, INHALE 2 PUFFS BY MOUTH 2 TIMES A DAY RINSE MOUTH AFTER USE. (Patient not taking: Reported on 4/4/2024), Disp: 30.6 g, Rfl: 2    tiZANidine (ZANAFLEX) 4 mg tablet, Take 1 tablet (4 mg total) by mouth daily at bedtime (Patient not taking: Reported on 9/17/2024), Disp: 30 tablet, Rfl: 2    traZODone (DESYREL) 50 mg tablet, TAKE 1 TABLET BY MOUTH DAILY AT BEDTIME (Patient not taking: Reported on 9/17/2024), Disp: 90 tablet, Rfl:  "1    Allergies   Allergen Reactions    Shellfish-Derivedproducts [Shellfish-Derived Products - Food Allergy] Anaphylaxis    Phenytoin Hives    Shellfish Allergy - Food Allergy Other (See Comments)       Physical Exam:    /72 (BP Location: Left arm, Patient Position: Sitting, Cuff Size: Standard)   Pulse 94   Temp 97.9 °F (36.6 °C)   Ht 5' 2\" (1.575 m)   Wt 68.9 kg (152 lb)   BMI 27.80 kg/m²     Constitutional:normal, well developed, well nourished, alert, in no distress and non-toxic and no overt pain behavior.  Eyes:anicteric  HEENT:grossly intact  Neck:supple, symmetric, trachea midline and no masses   Pulmonary:even and unlabored  Cardiovascular:No edema or pitting edema present  Skin:Normal without rashes or lesions and well hydrated  Psychiatric:Mood and affect appropriate  Neurologic:Cranial Nerves II-XII grossly intact  Musculoskeletal: Lumbar scar from prior surgery healed, significant tightness along the bilateral paraspinal muscles of the thoracic spine.  Mild tenderness palpation of bilateral sacroiliac joints.      Imaging  No orders to display         Orders Placed This Encounter   Procedures    Ambulatory referral to Physical Therapy       "

## 2024-10-01 ENCOUNTER — TELEPHONE (OUTPATIENT)
Age: 64
End: 2024-10-01

## 2024-10-01 NOTE — TELEPHONE ENCOUNTER
Patient called the RX Refill Line. Message is being forwarded to the office.     Patient is requesting a refill on meloxicam. Refusal states must contact provider first.    Please contact patient at [phone]

## 2024-10-03 DIAGNOSIS — M47.816 LUMBAR SPONDYLOSIS: ICD-10-CM

## 2024-10-03 DIAGNOSIS — Z98.1 HISTORY OF LUMBAR FUSION: ICD-10-CM

## 2024-10-03 DIAGNOSIS — M46.1 SACROILIITIS (HCC): ICD-10-CM

## 2024-10-03 DIAGNOSIS — M54.16 LUMBAR RADICULOPATHY: ICD-10-CM

## 2024-10-03 RX ORDER — MELOXICAM 15 MG/1
15 TABLET ORAL DAILY
Qty: 30 TABLET | Refills: 2 | Status: SHIPPED | OUTPATIENT
Start: 2024-10-03

## 2024-10-03 NOTE — TELEPHONE ENCOUNTER
Patient returned called.  Patient would like the refill. Patient states that she takes it once a day and it helps with the pain. Patient has no side effects. Patient does not want a refill on the gabapentin at this time.

## 2024-10-03 NOTE — TELEPHONE ENCOUNTER
Attempted to call the patient to clarify.  Last ordered on 7/11/24 c/ 2 refills.  Looks like her Gabapentin may need refill also.  Next OVS scheduled for 12/14/24. LMOM to CB to clarify and My chart message to be sent.

## 2024-10-07 LAB
LEFT EYE DIABETIC RETINOPATHY: NORMAL
RIGHT EYE DIABETIC RETINOPATHY: NORMAL

## 2024-10-08 LAB
BUN SERPL-MCNC: 22 MG/DL (ref 8–27)
BUN/CREAT SERPL: 22 (ref 12–28)
CALCIUM SERPL-MCNC: 9.9 MG/DL (ref 8.7–10.3)
CHLORIDE SERPL-SCNC: 103 MMOL/L (ref 96–106)
CO2 SERPL-SCNC: 20 MMOL/L (ref 20–29)
CREAT SERPL-MCNC: 0.98 MG/DL (ref 0.57–1)
EGFR: 64 ML/MIN/1.73
EST. AVERAGE GLUCOSE BLD GHB EST-MCNC: 166 MG/DL
GLUCOSE SERPL-MCNC: 158 MG/DL (ref 70–99)
HBA1C MFR BLD: 7.4 % (ref 4.8–5.6)
POTASSIUM SERPL-SCNC: 5.3 MMOL/L (ref 3.5–5.2)
SODIUM SERPL-SCNC: 142 MMOL/L (ref 134–144)

## 2024-10-09 ENCOUNTER — TELEPHONE (OUTPATIENT)
Age: 64
End: 2024-10-09

## 2024-10-09 ENCOUNTER — OFFICE VISIT (OUTPATIENT)
Dept: ENDOCRINOLOGY | Facility: CLINIC | Age: 64
End: 2024-10-09
Payer: MEDICARE

## 2024-10-09 VITALS
DIASTOLIC BLOOD PRESSURE: 78 MMHG | HEIGHT: 63 IN | WEIGHT: 154.2 LBS | SYSTOLIC BLOOD PRESSURE: 118 MMHG | BODY MASS INDEX: 27.32 KG/M2

## 2024-10-09 DIAGNOSIS — E78.5 HYPERLIPIDEMIA, UNSPECIFIED HYPERLIPIDEMIA TYPE: ICD-10-CM

## 2024-10-09 DIAGNOSIS — E11.42 TYPE 2 DIABETES MELLITUS WITH DIABETIC POLYNEUROPATHY, WITHOUT LONG-TERM CURRENT USE OF INSULIN (HCC): Primary | ICD-10-CM

## 2024-10-09 DIAGNOSIS — I10 HYPERTENSION, UNSPECIFIED TYPE: ICD-10-CM

## 2024-10-09 PROCEDURE — 99214 OFFICE O/P EST MOD 30 MIN: CPT | Performed by: INTERNAL MEDICINE

## 2024-10-09 NOTE — TELEPHONE ENCOUNTER
Pt called stating she is running about 9/10 minutes late.    Advised PT of our 15 min policy.    Advised office in teams.

## 2024-10-09 NOTE — PROGRESS NOTES
10/9/2024    Assessment & Plan      Diagnoses and all orders for this visit:    Type 2 diabetes mellitus with diabetic polyneuropathy, without long-term current use of insulin (HCC)  -     Hemoglobin A1C; Future  -     Comprehensive metabolic panel; Future  -     CBC and differential; Future  -     Albumin / creatinine urine ratio; Future  -     TSH, 3rd generation; Future  -     Hemoglobin A1C  -     Comprehensive metabolic panel  -     CBC and differential  -     Albumin / creatinine urine ratio  -     TSH, 3rd generation  -     sitaGLIPtin (JANUVIA) 100 mg tablet; Take 1 tablet (100 mg total) by mouth daily    Hyperlipidemia, unspecified hyperlipidemia type  -     Lipid Panel with Direct LDL reflex; Future  -     Lipid Panel with Direct LDL reflex    Hypertension, unspecified type        Assessment/Plan:  1.  Type 2 diabetes: A1c has increased slightly above goal.  She acknowledges room for improvement in terms of increased exercise and dietary monitoring.  In the past her A1c has improved with these lifestyle measures so we elected to continue her current regimen for now and plan to repeat labs prior to next point which will be in about 3-4 months.  Follow-up at that time.    2.  Hyperlipidemia: Continue statin.      CC: Diabetes follow-up    History of Present Illness     HPI: Jada Hui is a 64 y.o. year old female with type 2 diabetes for  about 15  years.  She is on oral agents at home and takes metformin 1000 mg twice daily,  glimepiride 4 mg twice daily, empagliflozin-linagliptin combination. She denies any polyuria, polydipsia, nocturia and blurry vision.  She denies history of peripheral neuropathy, nephropathy, retinopathy and heart attack but does have a history of TIA.  In the past she did not tolerate Ozempic.    Hypoglycemic episodes: No.     The patient's last eye exam was in Monday per pt with no .  The patient's last foot exam was in May 2024.    Blood Sugar/Glucometer/Pump/CGM review: Blood  sugars typically checked once daily and fasting have ranged from 119-194.    Hyperlipidemia: Atorvastatin.    Review of Systems   Constitutional:  Negative for chills and fatigue.   HENT:  Negative for trouble swallowing and voice change.    Eyes:  Negative for visual disturbance.   Respiratory:  Negative for shortness of breath.    Cardiovascular:  Negative for palpitations and leg swelling.   Gastrointestinal:  Negative for abdominal pain, nausea and vomiting.   Endocrine: Negative for polydipsia and polyuria.   Musculoskeletal:  Negative for arthralgias and myalgias.   Skin:  Negative for rash.   Neurological:  Negative for dizziness, tremors and weakness.   Hematological:  Negative for adenopathy.   Psychiatric/Behavioral:  Negative for agitation and confusion.        Historical Information   Past Medical History:   Diagnosis Date    Anemia 2008    Due to heavy menstruation    Arthritis     Asthma 2005    Seasonal    Diabetes mellitus (HCC) 2006    GERD (gastroesophageal reflux disease) 2008    Could be due to gastroparesis    Hyperlipidemia     Hypertension 2005    Pneumonia 2018    Walking pneumonia    TIA (transient ischemic attack)      Past Surgical History:   Procedure Laterality Date    ADENOIDECTOMY  1965    BACK SURGERY      cervical and lumbar    CHOLECYSTECTOMY  2006    COLONOSCOPY      [place] BIOPSY SOFT TISSUE BACK/FLANK DEEP N/A 2/7/2024    Procedure: EXCISION MASS BACK;  Surgeon: Ottoniel Lake MD;  Location:  MAIN OR;  Service: General    SPINE SURGERY  2018, 2019 and 2021    ACDF 2018, Laminectomy 2019 and fusion 2021    TONSILLECTOMY  1965    TUBAL LIGATION       Social History   Social History     Substance and Sexual Activity   Alcohol Use Yes    Comment: Occasional     Social History     Substance and Sexual Activity   Drug Use Never     Social History     Tobacco Use   Smoking Status Former    Current packs/day: 0.00    Average packs/day: 0.5 packs/day for 2.0 years (1.0 ttl pk-yrs)     Types: Cigarettes    Start date: 1983    Quit date: 1985    Years since quittin.7    Passive exposure: Past   Smokeless Tobacco Never   Tobacco Comments     years ago     Family History:   Family History   Problem Relation Age of Onset    Cancer Mother         Unknown    Clotting disorder Mother         Factor XI deficiency    Coronary artery disease Father          at 52 years    Diabetes Father     Hypertension Father     Diabetes type II Father     Breast cancer Sister         50's    Cancer Sister         Breast cancer twice    COPD Sister     No Known Problems Daughter     No Known Problems Daughter     No Known Problems Daughter     No Known Problems Daughter     No Known Problems Maternal Grandmother     No Known Problems Maternal Grandfather     No Known Problems Paternal Grandmother     No Known Problems Paternal Grandfather     No Known Problems Maternal Aunt     Breast cancer Paternal Aunt         60's    Cancer Paternal Aunt         Breast cancer    Diabetes Paternal Aunt     Hypertension Paternal Aunt     Coronary artery disease Brother         Congestive Heart Failure    Heart failure Brother     Hypertension Brother     Heart failure Paternal Uncle     Hypertension Paternal Uncle     Heart failure Paternal Uncle     Hypertension Paternal Uncle     Diabetes type II Family         Daughter    Hypothyroidism Family         Daughter       Meds/Allergies   Current Outpatient Medications   Medication Sig Dispense Refill    acetaminophen (TYLENOL) 650 mg CR tablet Take 1,300 mg by mouth 2 (two) times a day      albuterol (ProAir HFA) 90 mcg/act inhaler Inhale 2 puffs every 6 (six) hours as needed for wheezing 18 g 1    Alcohol Swabs (Alcohol Prep) PADS Use in the morning 100 each 3    atorvastatin (LIPITOR) 40 mg tablet TAKE 1 TABLET DAILY 90 tablet 1    b complex vitamins capsule Take 1 capsule by mouth daily      BIOTIN BEAUTY EXTRA STRENGTH PO Take by mouth      Blood Glucose  Monitoring Suppl (ONE TOUCH ULTRA 2) w/Device KIT Test once daily 1 kit 0    Empagliflozin-linaGLIPtin (Glyxambi) 25-5 MG TABS TAKE 1 TABLET DAILY 90 tablet 1    EPINEPHrine (EPIPEN) 0.3 mg/0.3 mL SOAJ INJECT 0.3 ML INTO A MUSCLE ONCE FOR 1 DOSE 2 each 0    glimepiride (AMARYL) 4 mg tablet TAKE 1 TABLET TWICE A  tablet 1    glucose blood (OneTouch Ultra) test strip USE TO TEST ONCE DAILY 100 strip 3    Lancets (onetouch ultrasoft) lancets Test once daily 100 each 3    meloxicam (Mobic) 15 mg tablet Take 1 tablet (15 mg total) by mouth daily 30 tablet 2    metFORMIN (GLUCOPHAGE) 1000 MG tablet TAKE 1 TABLET TWICE DAILY  WITH MEALS 180 tablet 3    Multiple Vitamin (MULTIVITAMIN ADULT PO) Take 1 tablet by mouth daily      Omega-3 Fatty Acids (Fish Oil) 1200 MG CAPS       omeprazole (PriLOSEC) 40 MG capsule TAKE 1 CAPSULE DAILY 90 capsule 1    Rhubarb (ESTROVEN MENOPAUSE RELIEF PO) Take 1 tablet by mouth daily      sitaGLIPtin (JANUVIA) 100 mg tablet Take 1 tablet (100 mg total) by mouth daily 90 tablet 0    gabapentin (NEURONTIN) 300 mg capsule Take 1 capsule (300 mg total) by mouth daily at bedtime (Patient not taking: Reported on 9/17/2024) 30 capsule 2    ibuprofen (MOTRIN) 800 mg tablet Take 1 tablet (800 mg total) by mouth every 8 (eight) hours as needed for mild pain As needed (Patient not taking: Reported on 9/17/2024) 60 tablet 1    MAGNESIUM OXIDE 400 PO Take by mouth      Symbicort 160-4.5 MCG/ACT inhaler INHALE 2 PUFFS BY MOUTH 2 TIMES A DAY RINSE MOUTH AFTER USE. (Patient not taking: Reported on 4/4/2024) 30.6 g 2    tiZANidine (ZANAFLEX) 4 mg tablet Take 1 tablet (4 mg total) by mouth daily at bedtime (Patient not taking: Reported on 9/17/2024) 30 tablet 2    traZODone (DESYREL) 50 mg tablet TAKE 1 TABLET BY MOUTH DAILY AT BEDTIME (Patient not taking: Reported on 9/17/2024) 90 tablet 1    TURMERIC PO Take 500 mg by mouth daily (Patient not taking: Reported on 10/9/2024)       No current  "facility-administered medications for this visit.     Allergies   Allergen Reactions    Shellfish-Derivedproducts [Shellfish-Derived Products - Food Allergy] Anaphylaxis    Phenytoin Hives    Shellfish Allergy - Food Allergy Other (See Comments)       Objective   Vitals: Blood pressure 118/78, height 5' 3\" (1.6 m), weight 69.9 kg (154 lb 3.2 oz).  Invasive Devices       None                   Physical Exam  Vitals reviewed.   Constitutional:       General: She is not in acute distress.     Appearance: She is well-developed. She is not diaphoretic.   HENT:      Head: Normocephalic and atraumatic.   Eyes:      Conjunctiva/sclera: Conjunctivae normal.      Pupils: Pupils are equal, round, and reactive to light.   Neck:      Thyroid: No thyromegaly.   Cardiovascular:      Rate and Rhythm: Normal rate and regular rhythm.   Pulmonary:      Effort: Pulmonary effort is normal. No respiratory distress.      Breath sounds: Normal breath sounds.   Abdominal:      General: Bowel sounds are normal.      Palpations: Abdomen is soft.   Musculoskeletal:         General: Normal range of motion.      Cervical back: Normal range of motion and neck supple.   Skin:     General: Skin is warm and dry.      Findings: No rash.   Neurological:      Mental Status: She is alert and oriented to person, place, and time.      Motor: No abnormal muscle tone.   Psychiatric:         Behavior: Behavior normal.         The history was obtained from the review of the chart and from the patient.    Lab Results:    Most recent Alc is  Lab Results   Component Value Date    HGBA1C 7.4 (H) 10/07/2024           No components found for: \"HA1C\"  No components found for: \"GLU\"    Lab Results   Component Value Date    CREATININE 0.98 10/07/2024    CREATININE 0.78 04/08/2024    CREATININE 0.72 12/20/2023    BUN 22 10/07/2024    K 5.3 (H) 10/07/2024     10/07/2024    CO2 20 10/07/2024     eGFR   Date Value Ref Range Status   10/07/2024 64 >59 mL/min/1.73 " "Final   09/23/2023 82 ml/min/1.73sq m Final     No components found for: \"MALBCRER\"    Lab Results   Component Value Date    HDL 47 04/08/2024    TRIG 169 (H) 04/08/2024       Lab Results   Component Value Date    ALT 30 04/08/2024    AST 20 04/08/2024    ALKPHOS 100 09/23/2023       Lab Results   Component Value Date    TSH 3.410 05/18/2023           Future Appointments   Date Time Provider Department Center   10/28/2024  9:30 AM [facility] DEXA UP 1 UB UP Dexa [facility] UPPER PER   10/28/2024 10:00 AM UB MAMMO UP 1 UB UP Mammo U[facility] UPPER PER   12/10/2024  9:30 AM TIRSO Funez [facility]   4/1/2025 10:45 AM DO MARLENI Beasley [facility]       Portions of the record may have been created with voice recognition software. Occasional wrong word or \"sound a like\" substitutions may have occurred due to the inherent limitations of voice recognition software. Read the chart carefully and recognize, using context, where substitutions have occurred.    "

## 2024-10-26 DIAGNOSIS — E11.42 TYPE 2 DIABETES MELLITUS WITH DIABETIC POLYNEUROPATHY, WITHOUT LONG-TERM CURRENT USE OF INSULIN (HCC): ICD-10-CM

## 2024-10-26 DIAGNOSIS — E78.5 HYPERLIPIDEMIA, UNSPECIFIED HYPERLIPIDEMIA TYPE: ICD-10-CM

## 2024-10-26 DIAGNOSIS — K21.00 GASTROESOPHAGEAL REFLUX DISEASE WITH ESOPHAGITIS, UNSPECIFIED WHETHER HEMORRHAGE: ICD-10-CM

## 2024-10-26 RX ORDER — OMEPRAZOLE 40 MG/1
40 CAPSULE, DELAYED RELEASE ORAL DAILY
Qty: 90 CAPSULE | Refills: 1 | Status: SHIPPED | OUTPATIENT
Start: 2024-10-26

## 2024-10-26 RX ORDER — ATORVASTATIN CALCIUM 40 MG/1
TABLET, FILM COATED ORAL
Qty: 90 TABLET | Refills: 1 | Status: SHIPPED | OUTPATIENT
Start: 2024-10-26

## 2024-10-28 ENCOUNTER — HOSPITAL ENCOUNTER (OUTPATIENT)
Dept: MAMMOGRAPHY | Facility: CLINIC | Age: 64
Discharge: HOME | End: 2024-10-28
Payer: MEDICARE

## 2024-10-28 ENCOUNTER — HOSPITAL ENCOUNTER (OUTPATIENT)
Dept: BONE DENSITY | Facility: CLINIC | Age: 64
Discharge: HOME | End: 2024-10-28
Payer: MEDICARE

## 2024-10-28 VITALS — HEIGHT: 62 IN | WEIGHT: 155 LBS | BODY MASS INDEX: 28.52 KG/M2

## 2024-10-28 VITALS — BODY MASS INDEX: 27.29 KG/M2 | WEIGHT: 154 LBS | HEIGHT: 63 IN

## 2024-10-28 DIAGNOSIS — Z12.31 ENCOUNTER FOR SCREENING MAMMOGRAM FOR BREAST CANCER: ICD-10-CM

## 2024-10-28 DIAGNOSIS — Z78.0 ASYMPTOMATIC MENOPAUSE: ICD-10-CM

## 2024-10-28 PROCEDURE — 77080 DXA BONE DENSITY AXIAL: CPT

## 2024-10-28 PROCEDURE — 77063 BREAST TOMOSYNTHESIS BI: CPT

## 2024-10-28 PROCEDURE — 77067 SCR MAMMO BI INCL CAD: CPT

## 2024-10-28 RX ORDER — GLIMEPIRIDE 4 MG/1
TABLET ORAL
Qty: 180 TABLET | Refills: 1 | Status: SHIPPED | OUTPATIENT
Start: 2024-10-28

## 2024-12-10 ENCOUNTER — OFFICE VISIT (OUTPATIENT)
Dept: PAIN MEDICINE | Facility: CLINIC | Age: 64
End: 2024-12-10
Payer: MEDICARE

## 2024-12-10 VITALS
HEART RATE: 91 BPM | WEIGHT: 151 LBS | DIASTOLIC BLOOD PRESSURE: 82 MMHG | TEMPERATURE: 97.8 F | HEIGHT: 63 IN | SYSTOLIC BLOOD PRESSURE: 120 MMHG | BODY MASS INDEX: 26.75 KG/M2

## 2024-12-10 DIAGNOSIS — M54.2 NECK PAIN: ICD-10-CM

## 2024-12-10 DIAGNOSIS — M54.16 LUMBAR RADICULOPATHY: ICD-10-CM

## 2024-12-10 DIAGNOSIS — M96.1 LUMBAR POSTLAMINECTOMY SYNDROME: ICD-10-CM

## 2024-12-10 DIAGNOSIS — M46.1 SACROILIITIS (HCC): ICD-10-CM

## 2024-12-10 DIAGNOSIS — Z98.1 HISTORY OF LUMBAR FUSION: ICD-10-CM

## 2024-12-10 DIAGNOSIS — M47.812 CERVICAL SPONDYLOSIS: ICD-10-CM

## 2024-12-10 DIAGNOSIS — M79.18 CERVICAL MYOFASCIAL PAIN SYNDROME: ICD-10-CM

## 2024-12-10 DIAGNOSIS — M47.816 LUMBAR SPONDYLOSIS: Primary | ICD-10-CM

## 2024-12-10 PROCEDURE — G2211 COMPLEX E/M VISIT ADD ON: HCPCS | Performed by: PHYSICIAN ASSISTANT

## 2024-12-10 PROCEDURE — 99214 OFFICE O/P EST MOD 30 MIN: CPT | Performed by: PHYSICIAN ASSISTANT

## 2024-12-10 RX ORDER — MELOXICAM 15 MG/1
15 TABLET ORAL DAILY
Qty: 30 TABLET | Refills: 2 | Status: SHIPPED | OUTPATIENT
Start: 2024-12-10

## 2024-12-10 RX ORDER — DAPAGLIFLOZIN 10 MG/1
TABLET, FILM COATED ORAL
COMMUNITY
Start: 2024-10-13

## 2024-12-10 RX ORDER — SITAGLIPTIN 100 MG/1
TABLET, FILM COATED ORAL
COMMUNITY
Start: 2024-10-13

## 2024-12-10 RX ORDER — GABAPENTIN 300 MG/1
300 CAPSULE ORAL
Qty: 30 CAPSULE | Refills: 2 | Status: SHIPPED | OUTPATIENT
Start: 2024-12-10

## 2024-12-10 NOTE — PROGRESS NOTES
Assessment:  1. Lumbar spondylosis    2. Lumbar postlaminectomy syndrome    3. History of lumbar fusion    4. Sacroiliitis (HCC)    5. Lumbar radiculopathy    6. Cervical spondylosis    7. Neck pain    8. Cervical myofascial pain syndrome        Plan:  While the patient was in the office today, I did have a thorough conversation regarding their chronic pain syndrome, medication management, and treatment plan options.    Consider repeat SI joint injections in the future if indicated and as long as her hemoglobin A1c remains below 8.0.    Continue and refill meloxicam 15 mg daily and gabapentin 300 mg nightly.    I have provided the patient was not a prescription for physical therapy.  Patient is interested in resuming and has been unable to get there due to the busy holiday schedule.    The patient will follow-up in 12 weeks for medication prescription refill and reevaluation. The patient was advised to contact the office should their symptoms worsen in the interim. The patient was agreeable and verbalized an understanding.        History of Present Illness:    The patient is a 64 y.o. female last seen on 9/17/2024 who presents for a follow up office visit in regards to chronic low back pain secondary to lumbar postlaminectomy pain syndrome/lumbar fusion, lumbar spondylosis, sacroiliitis/sacroiliac joint dysfunction as well as chronic left-sided myofascial neck pain with a history of cervical postlaminectomy pain syndrome/fusion.  The patient currently reports low back pain with referred pain into the right hip and groin area as well as left-sided neck pain.  She rates her pain score 8.5 out of 10 and describes it as an intermittent burning, sharp and shooting pain.  Pain is made worse with prolonged walking, prolonged standing and prolonged sitting.  She underwent bilateral sacroiliac joint injections in August with greater than 50% reduction of pain which is still ongoing.  Patient is taking meloxicam and  gabapentin with relief and no side effects.  She is interested in resuming physical therapy.    I have personally reviewed and/or updated the patient's past medical history, past surgical history, family history, social history, current medications, allergies, and vital signs today.       Review of Systems:    Review of Systems   Respiratory:  Negative for shortness of breath.    Cardiovascular:  Negative for chest pain.   Gastrointestinal:  Negative for constipation, diarrhea, nausea and vomiting.   Musculoskeletal:  Positive for gait problem. Negative for arthralgias, joint swelling (Joint stiffness) and myalgias.   Skin:  Negative for rash.   Neurological:  Negative for dizziness, seizures and weakness.   All other systems reviewed and are negative.        Past Medical History:   Diagnosis Date   • Anemia     Due to heavy menstruation   • Arthritis    • Asthma     Seasonal   • BRCA1 negative    • BRCA2 negative    • Diabetes mellitus (HCC)    • GERD (gastroesophageal reflux disease)     Could be due to gastroparesis   • Hyperlipidemia    • Hypertension    • Pneumonia     Walking pneumonia   • TIA (transient ischemic attack)        Past Surgical History:   Procedure Laterality Date   • ADENOIDECTOMY  1965   • BACK SURGERY      cervical and lumbar   • CHOLECYSTECTOMY     • COLONOSCOPY     • [place] BIOPSY SOFT TISSUE BACK/FLANK DEEP N/A 2024    Procedure: EXCISION MASS BACK;  Surgeon: Ottoniel Lake MD;  Location:  MAIN OR;  Service: General   • SPINE SURGERY  , 2019 and     ACDF , Laminectomy  and fusion    • TONSILLECTOMY  1965   • TUBAL LIGATION         Family History   Problem Relation Age of Onset   • Cancer Mother         Unknown   • Clotting disorder Mother         Factor XI deficiency   • Coronary artery disease Father          at 52 years   • Diabetes Father    • Hypertension Father    • Diabetes type II Father    • BRCA2 Negative Sister    •  BRCA1 Negative Sister    • Breast cancer additional onset Sister 55   • Breast cancer Sister         50's   • Cancer Sister         Breast cancer twice   • COPD Sister    • No Known Problems Daughter    • No Known Problems Daughter    • No Known Problems Daughter    • No Known Problems Daughter    • No Known Problems Maternal Grandmother    • No Known Problems Maternal Grandfather    • No Known Problems Paternal Grandmother    • No Known Problems Paternal Grandfather    • Coronary artery disease Brother         Congestive Heart Failure   • Heart failure Brother    • Hypertension Brother    • No Known Problems Maternal Aunt    • Breast cancer Paternal Aunt         60's   • Cancer Paternal Aunt         Breast cancer   • Diabetes Paternal Aunt    • Hypertension Paternal Aunt    • Heart failure Paternal Uncle    • Hypertension Paternal Uncle    • Heart failure Paternal Uncle    • Hypertension Paternal Uncle    • Diabetes type II Family         Daughter   • Hypothyroidism Family         Daughter       Social History     Occupational History   • Not on file   Tobacco Use   • Smoking status: Former     Current packs/day: 0.00     Average packs/day: 0.5 packs/day for 2.0 years (1.0 ttl pk-yrs)     Types: Cigarettes     Start date: 1983     Quit date: 1985     Years since quittin.9     Passive exposure: Past   • Smokeless tobacco: Never   • Tobacco comments:      years ago   Vaping Use   • Vaping status: Never Used   Substance and Sexual Activity   • Alcohol use: Yes     Comment: Occasional   • Drug use: Never   • Sexual activity: Not Currently     Partners: Male     Birth control/protection: Post-menopausal, Female Sterilization         Current Outpatient Medications:   •  acetaminophen (TYLENOL) 650 mg CR tablet, Take 1,300 mg by mouth 2 (two) times a day, Disp: , Rfl:   •  albuterol (ProAir HFA) 90 mcg/act inhaler, Inhale 2 puffs every 6 (six) hours as needed for wheezing, Disp: 18 g, Rfl: 1  •  atorvastatin  (LIPITOR) 40 mg tablet, TAKE 1 TABLET DAILY, Disp: 90 tablet, Rfl: 1  •  b complex vitamins capsule, Take 1 capsule by mouth daily, Disp: , Rfl:   •  BIOTIN BEAUTY EXTRA STRENGTH PO, Take by mouth, Disp: , Rfl:   •  dapagliflozin (Farxiga) 10 MG tablet, , Disp: , Rfl:   •  gabapentin (NEURONTIN) 300 mg capsule, Take 1 capsule (300 mg total) by mouth daily at bedtime, Disp: 30 capsule, Rfl: 2  •  glimepiride (AMARYL) 4 mg tablet, TAKE 1 TABLET TWICE A DAY, Disp: 180 tablet, Rfl: 1  •  Januvia 100 MG tablet, , Disp: , Rfl:   •  meloxicam (Mobic) 15 mg tablet, Take 1 tablet (15 mg total) by mouth daily, Disp: 30 tablet, Rfl: 2  •  metFORMIN (GLUCOPHAGE) 1000 MG tablet, TAKE 1 TABLET TWICE DAILY  WITH MEALS, Disp: 180 tablet, Rfl: 3  •  Multiple Vitamin (MULTIVITAMIN ADULT PO), Take 1 tablet by mouth daily, Disp: , Rfl:   •  Omega-3 Fatty Acids (Fish Oil) 1200 MG CAPS, , Disp: , Rfl:   •  omeprazole (PriLOSEC) 40 MG capsule, TAKE 1 CAPSULE DAILY, Disp: 90 capsule, Rfl: 1  •  Symbicort 160-4.5 MCG/ACT inhaler, INHALE 2 PUFFS BY MOUTH 2 TIMES A DAY RINSE MOUTH AFTER USE., Disp: 30.6 g, Rfl: 2  •  Alcohol Swabs (Alcohol Prep) PADS, Use in the morning, Disp: 100 each, Rfl: 3  •  Blood Glucose Monitoring Suppl (ONE TOUCH ULTRA 2) w/Device KIT, Test once daily, Disp: 1 kit, Rfl: 0  •  Empagliflozin-linaGLIPtin (Glyxambi) 25-5 MG TABS, TAKE 1 TABLET DAILY, Disp: 90 tablet, Rfl: 1  •  EPINEPHrine (EPIPEN) 0.3 mg/0.3 mL SOAJ, INJECT 0.3 ML INTO A MUSCLE ONCE FOR 1 DOSE, Disp: 2 each, Rfl: 0  •  glucose blood (OneTouch Ultra) test strip, USE TO TEST ONCE DAILY, Disp: 100 strip, Rfl: 3  •  ibuprofen (MOTRIN) 800 mg tablet, Take 1 tablet (800 mg total) by mouth every 8 (eight) hours as needed for mild pain As needed (Patient not taking: Reported on 9/17/2024), Disp: 60 tablet, Rfl: 1  •  Lancets (onetouch ultrasoft) lancets, Test once daily, Disp: 100 each, Rfl: 3  •  MAGNESIUM OXIDE 400 PO, Take by mouth, Disp: , Rfl:   •  Rhubarb  "(ESTROVEN MENOPAUSE RELIEF PO), Take 1 tablet by mouth daily (Patient not taking: Reported on 12/10/2024), Disp: , Rfl:   •  traZODone (DESYREL) 50 mg tablet, TAKE 1 TABLET BY MOUTH DAILY AT BEDTIME (Patient not taking: Reported on 12/10/2024), Disp: 90 tablet, Rfl: 1  •  TURMERIC PO, Take 500 mg by mouth daily (Patient not taking: Reported on 10/9/2024), Disp: , Rfl:     Allergies   Allergen Reactions   • Shellfish-Derivedproducts [Shellfish-Derived Products - Food Allergy] Anaphylaxis   • Phenytoin Hives   • Shellfish Allergy - Food Allergy Other (See Comments)       Physical Exam:    /82 (BP Location: Left arm, Patient Position: Sitting, Cuff Size: Standard)   Pulse 91   Temp 97.8 °F (36.6 °C)   Ht 5' 3\" (1.6 m)   Wt 68.5 kg (151 lb)   BMI 26.75 kg/m²     Constitutional:normal, well developed, well nourished, alert, in no distress and non-toxic and no overt pain behavior.  Eyes:anicteric  HEENT:grossly intact  Neck:supple, symmetric, trachea midline and no masses   Pulmonary:even and unlabored  Cardiovascular:No edema or pitting edema present  Skin:Normal without rashes or lesions and well hydrated  Psychiatric:Mood and affect appropriate  Neurologic:Cranial Nerves II-XII grossly intact  Musculoskeletal: Stable gait without the use of assistive medical, lumbar scar from prior surgery well-healed, tender bilateral sacroiliac joints, limited lumbar motion.      Imaging  No orders to display         Orders Placed This Encounter   Procedures   • Ambulatory referral to Physical Therapy       "

## 2025-01-03 ENCOUNTER — TELEPHONE (OUTPATIENT)
Age: 65
End: 2025-01-03

## 2025-01-03 DIAGNOSIS — K21.00 GASTROESOPHAGEAL REFLUX DISEASE WITH ESOPHAGITIS, UNSPECIFIED WHETHER HEMORRHAGE: ICD-10-CM

## 2025-01-03 DIAGNOSIS — E11.42 TYPE 2 DIABETES MELLITUS WITH DIABETIC POLYNEUROPATHY, WITHOUT LONG-TERM CURRENT USE OF INSULIN (HCC): ICD-10-CM

## 2025-01-03 DIAGNOSIS — E78.5 HYPERLIPIDEMIA, UNSPECIFIED HYPERLIPIDEMIA TYPE: ICD-10-CM

## 2025-01-03 RX ORDER — ATORVASTATIN CALCIUM 40 MG/1
40 TABLET, FILM COATED ORAL DAILY
Qty: 90 TABLET | Refills: 1 | Status: SHIPPED | OUTPATIENT
Start: 2025-01-03

## 2025-01-03 RX ORDER — OMEPRAZOLE 40 MG/1
40 CAPSULE, DELAYED RELEASE ORAL DAILY
Qty: 90 CAPSULE | Refills: 1 | Status: SHIPPED | OUTPATIENT
Start: 2025-01-03

## 2025-01-03 NOTE — TELEPHONE ENCOUNTER
Pt contacted Call Center requested refill of their medication.        Doctor Name: Flor Boston      Medication Name: meloxicam (Mobic)       Dosage of Med: 15 MG      Frequency of Med: 1 daily      Remaining Medication: 26 left      Pharmacy and Location: PLEASE USE [facility] SENT IN PRIOR ENCOUNTER    Pt. Preferred Callback Phone Number:     [phone]     ------------------------------------------------  Pt contacted Call Center requested refill of their medication.        Doctor Name: Flor Boston      Medication Name: Gabapentin      Dosage of Med: 300 MG      Frequency of Med: 1 at bedtime      Remaining Medication: Pt has 23 left      Pharmacy and Location: [facility]        Pt has 2 refills left at [facility] BUT They will not be covered. Pt will call [facility] to cancel current script. Please refill [facility].      Thank you.       PLEASE ADVISE PATIENTS:    REFILL REQUESTS WILL BE PROCESSED WITHIN 24-48 HOURS.

## 2025-01-03 NOTE — TELEPHONE ENCOUNTER
Patient has a new prescription plan.  Patient needs a new script for one touch test strips and lancets.  All prescriptions should be sent to [facility].

## 2025-01-03 NOTE — TELEPHONE ENCOUNTER
Caller: Pt    Doctor: Flor Boston    Reason for call: New RX Insurance   [facility]  [id number]  Bin: [id number]  [employer] [id number]  [facility]: [facility] [place]  Rxgroup: [id number]    Will be sending MED REFILL REQUEST SHORTLY please use new RX.    Call back#: [phone]

## 2025-01-03 NOTE — TELEPHONE ENCOUNTER
Patient got a new prescription insurance plan and will now need her prescriptions transferred to [facility]. At this time she only needs 2 sent to them and they are as follows:    omeprazole (PriLOSEC) 40 MG capsule     atorvastatin (LIPITOR) 40 mg tablet       She is due towards the end of the month for refills on both these medications but since its a mail order pharmacy she wanted to be sure the request to transfer came with enough time for them to mail her the medications.

## 2025-01-05 DIAGNOSIS — E11.42 TYPE 2 DIABETES MELLITUS WITH DIABETIC POLYNEUROPATHY, WITHOUT LONG-TERM CURRENT USE OF INSULIN (HCC): Primary | ICD-10-CM

## 2025-01-06 DIAGNOSIS — M47.816 LUMBAR SPONDYLOSIS: ICD-10-CM

## 2025-01-06 DIAGNOSIS — M54.16 LUMBAR RADICULOPATHY: ICD-10-CM

## 2025-01-06 DIAGNOSIS — Z98.1 HISTORY OF LUMBAR FUSION: ICD-10-CM

## 2025-01-06 DIAGNOSIS — M46.1 SACROILIITIS (HCC): ICD-10-CM

## 2025-01-06 RX ORDER — GABAPENTIN 300 MG/1
300 CAPSULE ORAL
Qty: 90 CAPSULE | Refills: 1 | Status: SHIPPED | OUTPATIENT
Start: 2025-01-06

## 2025-01-06 RX ORDER — GLIMEPIRIDE 4 MG/1
TABLET ORAL
Qty: 180 TABLET | Refills: 1 | Status: SHIPPED | OUTPATIENT
Start: 2025-01-06

## 2025-01-06 RX ORDER — MELOXICAM 15 MG/1
15 TABLET ORAL DAILY
Qty: 90 TABLET | Refills: 1 | Status: SHIPPED | OUTPATIENT
Start: 2025-01-06

## 2025-01-06 NOTE — TELEPHONE ENCOUNTER
Please let the pt know that she will need to provide this information to the pharmacy as the pharmacy will submit her rx to her insurance. Not this office. If she would like to keep a copy of this information in her [facility] chart - she will need to bring the card to the office to be scanned to her chart.

## 2025-01-06 NOTE — TELEPHONE ENCOUNTER
Requested medication(s) are due for refill today: Yes  Patient has already received a courtesy refill: No  Other reason request has been forwarded to provider: patient was here in october

## 2025-01-07 DIAGNOSIS — E11.42 TYPE 2 DIABETES MELLITUS WITH DIABETIC POLYNEUROPATHY, WITHOUT LONG-TERM CURRENT USE OF INSULIN (HCC): ICD-10-CM

## 2025-01-07 RX ORDER — BLOOD SUGAR DIAGNOSTIC
STRIP MISCELLANEOUS
Qty: 100 STRIP | Refills: 1 | Status: SHIPPED | OUTPATIENT
Start: 2025-01-07

## 2025-01-07 RX ORDER — SITAGLIPTIN 100 MG/1
100 TABLET, FILM COATED ORAL DAILY
Qty: 90 TABLET | Refills: 1 | Status: SHIPPED | OUTPATIENT
Start: 2025-01-07

## 2025-01-07 NOTE — TELEPHONE ENCOUNTER
Reason for call:   [x] Refill   [] Prior Auth  [] Other:     Office:   [] PCP/Provider -   [x] Specialty/Provider - Endo    Medication: glucose blood (OneTouch Ultra) test strip     Dose/Frequency: USE TO TEST ONCE DAILY     Quantity: 100    Pharmacy: [facility]/pharmacy #[id number] [address]     Does the patient have enough for 3 days?   [x] Yes   [] No - Send as HP to POD

## 2025-01-24 ENCOUNTER — TELEPHONE (OUTPATIENT)
Dept: ENDOCRINOLOGY | Facility: CLINIC | Age: 65
End: 2025-01-24

## 2025-01-24 DIAGNOSIS — E11.42 TYPE 2 DIABETES MELLITUS WITH DIABETIC POLYNEUROPATHY, WITHOUT LONG-TERM CURRENT USE OF INSULIN (HCC): Primary | ICD-10-CM

## 2025-01-24 RX ORDER — EMPAGLIFLOZIN AND LINAGLIPTIN 25; 5 MG/1; MG/1
TABLET, FILM COATED ORAL
Qty: 90 TABLET | Refills: 1 | Status: SHIPPED | OUTPATIENT
Start: 2025-01-24

## 2025-01-24 NOTE — TELEPHONE ENCOUNTER
Patient reports that she is ready for script of Glyxambi 25-5 MG to be sent to [facility] in [place]. Medication not on active med list but she states it was discussed with doctor already.

## 2025-01-29 ENCOUNTER — OFFICE VISIT (OUTPATIENT)
Dept: FAMILY MEDICINE CLINIC | Facility: CLINIC | Age: 65
End: 2025-01-29
Payer: MEDICARE

## 2025-01-29 VITALS
OXYGEN SATURATION: 96 % | HEIGHT: 63 IN | TEMPERATURE: 98.8 F | HEART RATE: 115 BPM | BODY MASS INDEX: 26.4 KG/M2 | WEIGHT: 149 LBS | RESPIRATION RATE: 16 BRPM | DIASTOLIC BLOOD PRESSURE: 84 MMHG | SYSTOLIC BLOOD PRESSURE: 130 MMHG

## 2025-01-29 DIAGNOSIS — E11.42 TYPE 2 DIABETES MELLITUS WITH DIABETIC POLYNEUROPATHY, WITHOUT LONG-TERM CURRENT USE OF INSULIN (HCC): ICD-10-CM

## 2025-01-29 DIAGNOSIS — D68.1 CONGENITAL FACTOR XI DEFICIENCY (HCC): ICD-10-CM

## 2025-01-29 DIAGNOSIS — M46.1 SACROILIITIS (HCC): ICD-10-CM

## 2025-01-29 DIAGNOSIS — R05.1 ACUTE COUGH: Primary | ICD-10-CM

## 2025-01-29 DIAGNOSIS — I47.10 PAROXYSMAL SUPRAVENTRICULAR TACHYCARDIA (HCC): ICD-10-CM

## 2025-01-29 DIAGNOSIS — R35.0 URINARY FREQUENCY: ICD-10-CM

## 2025-01-29 LAB
SL AMB  POCT GLUCOSE, UA: ABNORMAL
SL AMB LEUKOCYTE ESTERASE,UA: ABNORMAL
SL AMB POCT BILIRUBIN,UA: ABNORMAL
SL AMB POCT BLOOD,UA: ABNORMAL
SL AMB POCT CLARITY,UA: CLEAR
SL AMB POCT COLOR,UA: YELLOW
SL AMB POCT KETONES,UA: ABNORMAL
SL AMB POCT NITRITE,UA: ABNORMAL
SL AMB POCT PH,UA: 5
SL AMB POCT SPECIFIC GRAVITY,UA: 1.02
SL AMB POCT URINE PROTEIN: ABNORMAL
SL AMB POCT UROBILINOGEN: ABNORMAL

## 2025-01-29 PROCEDURE — 81002 URINALYSIS NONAUTO W/O SCOPE: CPT | Performed by: STUDENT IN AN ORGANIZED HEALTH CARE EDUCATION/TRAINING PROGRAM

## 2025-01-29 PROCEDURE — 99214 OFFICE O/P EST MOD 30 MIN: CPT | Performed by: STUDENT IN AN ORGANIZED HEALTH CARE EDUCATION/TRAINING PROGRAM

## 2025-01-29 PROCEDURE — G2211 COMPLEX E/M VISIT ADD ON: HCPCS | Performed by: STUDENT IN AN ORGANIZED HEALTH CARE EDUCATION/TRAINING PROGRAM

## 2025-01-29 RX ORDER — DOXYCYCLINE 100 MG/1
100 CAPSULE ORAL 2 TIMES DAILY
Qty: 10 CAPSULE | Refills: 0 | Status: SHIPPED | OUTPATIENT
Start: 2025-01-29 | End: 2025-02-03

## 2025-01-29 NOTE — ASSESSMENT & PLAN NOTE
Prior management with Hematology, pt notes she no longer has to see them per her report. Return precautions discussed

## 2025-01-29 NOTE — ASSESSMENT & PLAN NOTE
Follows with Cardiology ([facility])- continue follow up and their treatment course

## 2025-01-29 NOTE — ASSESSMENT & PLAN NOTE
Lab Results   Component Value Date    HGBA1C 7.4 (H) 10/07/2024   Follows with Endocrinology   F/u scheduled for March; focus on lifestyle changes from last apt   Has pending active labs for that apt   DM eye exam due; form handed to pt and requested she return it after eye exam

## 2025-01-30 LAB
BACTERIA UR CULT: NORMAL
Lab: NORMAL

## 2025-02-04 ENCOUNTER — RESULTS FOLLOW-UP (OUTPATIENT)
Dept: FAMILY MEDICINE CLINIC | Facility: CLINIC | Age: 65
End: 2025-02-04

## 2025-03-04 ENCOUNTER — OFFICE VISIT (OUTPATIENT)
Dept: FAMILY MEDICINE CLINIC | Facility: CLINIC | Age: 65
End: 2025-03-04
Payer: MEDICARE

## 2025-03-04 ENCOUNTER — OFFICE VISIT (OUTPATIENT)
Dept: PAIN MEDICINE | Facility: CLINIC | Age: 65
End: 2025-03-04
Payer: MEDICARE

## 2025-03-04 VITALS
HEART RATE: 103 BPM | OXYGEN SATURATION: 97 % | HEIGHT: 63 IN | WEIGHT: 151 LBS | BODY MASS INDEX: 26.75 KG/M2 | TEMPERATURE: 97.8 F

## 2025-03-04 VITALS
WEIGHT: 151.4 LBS | TEMPERATURE: 96.5 F | SYSTOLIC BLOOD PRESSURE: 130 MMHG | DIASTOLIC BLOOD PRESSURE: 70 MMHG | HEIGHT: 63 IN | RESPIRATION RATE: 16 BRPM | BODY MASS INDEX: 26.82 KG/M2 | OXYGEN SATURATION: 97 % | HEART RATE: 113 BPM

## 2025-03-04 DIAGNOSIS — M47.816 LUMBAR SPONDYLOSIS: Primary | ICD-10-CM

## 2025-03-04 DIAGNOSIS — M96.1 LUMBAR POSTLAMINECTOMY SYNDROME: ICD-10-CM

## 2025-03-04 DIAGNOSIS — M46.1 SACROILIITIS (HCC): ICD-10-CM

## 2025-03-04 DIAGNOSIS — M54.2 NECK PAIN: ICD-10-CM

## 2025-03-04 DIAGNOSIS — I47.10 PAROXYSMAL SUPRAVENTRICULAR TACHYCARDIA (HCC): ICD-10-CM

## 2025-03-04 DIAGNOSIS — M54.16 LUMBAR RADICULOPATHY: ICD-10-CM

## 2025-03-04 DIAGNOSIS — N89.8 VAGINAL IRRITATION: Primary | ICD-10-CM

## 2025-03-04 DIAGNOSIS — N94.89 VAGINAL BURNING: ICD-10-CM

## 2025-03-04 DIAGNOSIS — M47.812 CERVICAL SPONDYLOSIS: ICD-10-CM

## 2025-03-04 PROCEDURE — 99213 OFFICE O/P EST LOW 20 MIN: CPT | Performed by: STUDENT IN AN ORGANIZED HEALTH CARE EDUCATION/TRAINING PROGRAM

## 2025-03-04 PROCEDURE — G2211 COMPLEX E/M VISIT ADD ON: HCPCS | Performed by: PHYSICIAN ASSISTANT

## 2025-03-04 PROCEDURE — G2211 COMPLEX E/M VISIT ADD ON: HCPCS | Performed by: STUDENT IN AN ORGANIZED HEALTH CARE EDUCATION/TRAINING PROGRAM

## 2025-03-04 PROCEDURE — 99213 OFFICE O/P EST LOW 20 MIN: CPT | Performed by: PHYSICIAN ASSISTANT

## 2025-03-04 RX ORDER — FLUCONAZOLE 150 MG/1
TABLET ORAL
Qty: 2 TABLET | Refills: 0 | Status: SHIPPED | OUTPATIENT
Start: 2025-03-04 | End: 2025-03-11

## 2025-03-04 NOTE — PROGRESS NOTES
Name: Jada Hui      : 1960      MRN: [id number]  Encounter Provider: Jamila Mcwilliams MD  Encounter Date: 3/4/2025   Encounter department: [facility]  :  Assessment & Plan  Vaginal irritation  Vaginal erythema and irritation s/p abx exposure c/f candida infection     Pending swab results. Diflucan ordered; AE discussed     Return precautions discussed     Discussed discussing with Endocrine continued jardiance or not - pt declining stopping at this time   Orders:    VAGINOSIS DNA PROBE; Future    fluconazole (DIFLUCAN) 150 mg tablet; One tablet now and repeat in one week.    Paroxysmal supraventricular tachycardia (HCC)  Follows with Cardiology; at her baseline HR. Pt asymptomatic     She has discussed ablation in past with Cardiology but does not desire this at this time    Return precautions discussed               History of Present Illness   63 yo F w/ manjit of type II DM, HTN, asthma, gastroparesis presenting for genital irritation. Noted   developed genital itching/redness. Denies any discharge or bleeding. Had discussed stopping jardiance but she did not. Denies any fevers; denies any diarrhea; denies any constitutional sx. Sx onset after abx exposure        Female  Problem  The patient's primary symptoms include genital itching and a genital rash. This is a new problem. The current episode started 1 to 4 weeks ago. The problem occurs constantly. The problem has been unchanged. The pain is mild. The problem affects both sides. She is not pregnant. Associated symptoms include chills, frequency, joint pain, joint swelling, painful intercourse, rash and urgency. Pertinent negatives include no abdominal pain, anorexia, constipation, diarrhea, discolored urine, fever, flank pain, headaches, hematuria, nausea or vomiting. The symptoms are aggravated by urinating. She is not sexually active. No, her partner does not have an STD. She uses tubal ligation for contraception. She is  "postmenopausal.     Review of Systems   Constitutional:  Positive for chills. Negative for fever.   HENT:  Negative for trouble swallowing.    Respiratory:  Negative for shortness of breath.    Cardiovascular:  Negative for chest pain and palpitations.   Gastrointestinal:  Negative for abdominal pain, anorexia, constipation, diarrhea, nausea and vomiting.   Genitourinary:  Positive for frequency and urgency. Negative for flank pain and hematuria.   Musculoskeletal:  Positive for joint pain. Negative for gait problem.   Skin:  Positive for rash.   Neurological:  Negative for headaches.       Objective   /70 (BP Location: Left arm, Patient Position: Sitting, Cuff Size: Standard)   Pulse (!) 113   Temp (!) 96.5 °F (35.8 °C) (Tympanic)   Resp 16   Ht 5' 3\" (1.6 m)   Wt 68.7 kg (151 lb 6.4 oz)   SpO2 97%   BMI 26.82 kg/m²      Physical Exam  Vitals and nursing note reviewed. Exam conducted with a chaperone present (GRACIELA Tomlin).   Constitutional:       General: She is not in acute distress.     Appearance: Normal appearance. She is well-developed.   HENT:      Head: Normocephalic and atraumatic.   Eyes:      Conjunctiva/sclera: Conjunctivae normal.   Cardiovascular:      Rate and Rhythm: Regular rhythm. Tachycardia present.      Pulses: Normal pulses.      Heart sounds: Normal heart sounds. No murmur heard.  Pulmonary:      Effort: Pulmonary effort is normal. No respiratory distress.      Breath sounds: Normal breath sounds. No wheezing or rales.   Abdominal:      General: Bowel sounds are normal. There is no distension.      Palpations: Abdomen is soft.      Tenderness: There is no abdominal tenderness. There is no right CVA tenderness, left CVA tenderness, guarding or rebound.   Genitourinary:     Labia:         Right: Rash present. No lesion.         Left: Rash present. No lesion.       Vagina: Vaginal discharge present. No tenderness.      Cervix: Normal. No cervical motion tenderness, friability, " lesion, erythema or cervical bleeding.      Comments: Slight erythema of vaginal canal and labia; slight white discharge noted of canal (no Cervical os involvement)   Musculoskeletal:         General: No swelling. Normal range of motion.      Cervical back: Normal range of motion and neck supple.      Right lower leg: No edema.      Left lower leg: No edema.   Skin:     General: Skin is warm and dry.      Capillary Refill: Capillary refill takes less than 2 seconds.   Neurological:      Mental Status: She is alert.   Psychiatric:         Mood and Affect: Mood normal.       Administrative Statements   I have spent a total time of 20 minutes in caring for this patient on the day of the visit/encounter including Prognosis, Risks and benefits of tx options, Instructions for management, Patient and family education, Importance of tx compliance, Risk factor reductions, Impressions, Counseling / Coordination of care, Documenting in the medical record, and Reviewing/placing orders in the medical record (including tests, medications, and/or procedures).

## 2025-03-04 NOTE — ASSESSMENT & PLAN NOTE
Follows with Cardiology; at her baseline HR. Pt asymptomatic     She has discussed ablation in past with Cardiology but does not desire this at this time    Return precautions discussed

## 2025-03-04 NOTE — PROGRESS NOTES
Assessment:  1. Lumbar spondylosis    2. Lumbar postlaminectomy syndrome    3. Sacroiliitis (HCC)    4. Lumbar radiculopathy    5. Neck pain    6. Cervical spondylosis        Plan:  While the patient was in the office today, I did have a thorough conversation regarding their chronic pain syndrome, medication management, and treatment plan options.    The patient remains clinically stable and well-controlled on the current medication regimen of meloxicam 15 mg which she is taking on Monday/Wednesdays and Fridays.  Patient is also taking Tylenol arthritis with relief.  No refills are required on today's visit.    Continue physical therapy as scheduled  and transition to home exercise program when ready.    Consider SI joint injection in the future if indicated.  Patient is aware that hemoglobin A1c should be less than 8 for us to perform any steroid injections.    The patient will follow-up in 4 months for medication prescription refill and reevaluation. The patient was advised to contact the office should their symptoms worsen in the interim. The patient was agreeable and verbalized an understanding.        History of Present Illness:    The patient is a 64 y.o. female last seen on 12/10/2024 who presents for a follow up office visit in regards to chronic low back pain secondary to lumbar spondylosis, lumbar postlaminectomy pain syndrome/history of lumbar fusion as well as chronic neck pain secondary to cervical spondylosis with a history of postlaminectomy/ACDF..  The patient currently reports low back pain and left-sided neck pain.  Her current pain is rated a 3 out of 10 described as an intermittent dull, aching, shooting type of pain.  At times the pain radiates into the right hip and lateral thigh.  She is also noting left foot numbness when she lays down to go to bed at night.  Since her last visit, she started physical therapy which she feels is helping quite a bit.  She is taking meloxicam 15 mg Monday   and utilizing Tylenol arthritis regularly twice daily.  She feels her pain is currently manageable and does not feel the need for any type of interventional treatment at this time.    I have personally reviewed and/or updated the patient's past medical history, past surgical history, family history, social history, current medications, allergies, and vital signs today.       Review of Systems:    Review of Systems   Respiratory:  Negative for shortness of breath.    Cardiovascular:  Negative for chest pain.   Gastrointestinal:  Negative for constipation, diarrhea, nausea and vomiting.   Musculoskeletal:  Positive for gait problem. Negative for arthralgias, joint swelling (Joint stiffness) and myalgias.   Skin:  Negative for rash.   Neurological:  Negative for dizziness, seizures and weakness.   All other systems reviewed and are negative.        Past Medical History:   Diagnosis Date   • Anemia     Due to heavy menstruation   • Arthritis    • Asthma     Seasonal   • BRCA1 negative    • BRCA2 negative    • Diabetes mellitus (HCC)    • GERD (gastroesophageal reflux disease)     Could be due to gastroparesis   • Hyperlipidemia    • Hypertension    • Pneumonia     Walking pneumonia   • TIA (transient ischemic attack)        Past Surgical History:   Procedure Laterality Date   • ADENOIDECTOMY     • BACK SURGERY      cervical and lumbar   • CHOLECYSTECTOMY     • COLONOSCOPY     • HI BIOPSY SOFT TISSUE BACK/FLANK DEEP N/A 2024    Procedure: EXCISION MASS BACK;  Surgeon: Ottoniel Lake MD;  Location:  MAIN OR;  Service: General   • SPINE SURGERY  ,  and     ACDF , Laminectomy  and fusion    • TONSILLECTOMY  1965   • TUBAL LIGATION         Family History   Problem Relation Age of Onset   • Cancer Mother         Unknown   • Clotting disorder Mother         Factor XI deficiency   • Coronary artery disease Father          at 52 years   •  Diabetes Father    • Hypertension Father    • Diabetes type II Father    • BRCA2 Negative Sister    • BRCA1 Negative Sister    • Breast cancer additional onset Sister 55   • Breast cancer Sister         50's   • Cancer Sister         Breast cancer twice   • COPD Sister    • No Known Problems Daughter    • No Known Problems Daughter    • No Known Problems Daughter    • No Known Problems Daughter    • No Known Problems Maternal Grandmother    • No Known Problems Maternal Grandfather    • No Known Problems Paternal Grandmother    • No Known Problems Paternal Grandfather    • Coronary artery disease Brother         Congestive Heart Failure   • Heart failure Brother    • Hypertension Brother    • No Known Problems Maternal Aunt    • Breast cancer Paternal Aunt         60's   • Cancer Paternal Aunt         Breast cancer   • Diabetes Paternal Aunt    • Hypertension Paternal Aunt    • Heart failure Paternal Uncle    • Hypertension Paternal Uncle    • Heart failure Paternal Uncle    • Hypertension Paternal Uncle    • Diabetes type II Family         Daughter   • Hypothyroidism Family         Daughter       Social History     Occupational History   • Not on file   Tobacco Use   • Smoking status: Former     Current packs/day: 0.00     Average packs/day: 0.5 packs/day for 2.0 years (1.0 ttl pk-yrs)     Types: Cigarettes     Start date: 1983     Quit date: 1985     Years since quittin.1     Passive exposure: Past   • Smokeless tobacco: Never   • Tobacco comments:      years ago   Vaping Use   • Vaping status: Never Used   Substance and Sexual Activity   • Alcohol use: Yes     Comment: Occasional   • Drug use: Never   • Sexual activity: Not Currently     Partners: Male     Birth control/protection: Post-menopausal, Female Sterilization         Current Outpatient Medications:   •  acetaminophen (TYLENOL) 650 mg CR tablet, Take 1,300 mg by mouth 2 (two) times a day, Disp: , Rfl:   •  albuterol (ProAir HFA) 90 mcg/act  inhaler, Inhale 2 puffs every 6 (six) hours as needed for wheezing, Disp: 18 g, Rfl: 1  •  atorvastatin (LIPITOR) 40 mg tablet, Take 1 tablet (40 mg total) by mouth daily, Disp: 90 tablet, Rfl: 1  •  b complex vitamins capsule, Take 1 capsule by mouth daily, Disp: , Rfl:   •  BIOTIN BEAUTY EXTRA STRENGTH PO, Take by mouth, Disp: , Rfl:   •  Empagliflozin-linaGLIPtin (Glyxambi) 25-5 MG TABS, 1 tab daily, Disp: 90 tablet, Rfl: 1  •  gabapentin (NEURONTIN) 300 mg capsule, Take 1 capsule (300 mg total) by mouth daily at bedtime, Disp: 90 capsule, Rfl: 1  •  glimepiride (AMARYL) 4 mg tablet, TAKE 1 TABLET TWICE A DAY, Disp: 180 tablet, Rfl: 1  •  meloxicam (Mobic) 15 mg tablet, Take 1 tablet (15 mg total) by mouth daily, Disp: 90 tablet, Rfl: 1  •  metFORMIN (GLUCOPHAGE) 1000 MG tablet, Take 1 tablet (1,000 mg total) by mouth 2 (two) times a day with meals, Disp: 180 tablet, Rfl: 3  •  Multiple Vitamin (MULTIVITAMIN ADULT PO), Take 1 tablet by mouth daily, Disp: , Rfl:   •  Omega-3 Fatty Acids (Fish Oil) 1200 MG CAPS, , Disp: , Rfl:   •  omeprazole (PriLOSEC) 40 MG capsule, Take 1 capsule (40 mg total) by mouth daily, Disp: 90 capsule, Rfl: 1  •  Symbicort 160-4.5 MCG/ACT inhaler, INHALE 2 PUFFS BY MOUTH 2 TIMES A DAY RINSE MOUTH AFTER USE., Disp: 30.6 g, Rfl: 2  •  TURMERIC PO, Take 500 mg by mouth daily, Disp: , Rfl:   •  Alcohol Swabs (Alcohol Prep) PADS, Use in the morning, Disp: 100 each, Rfl: 3  •  Blood Glucose Monitoring Suppl (ONE TOUCH ULTRA 2) w/Device KIT, Test once daily, Disp: 1 kit, Rfl: 0  •  EPINEPHrine (EPIPEN) 0.3 mg/0.3 mL SOAJ, INJECT 0.3 ML INTO A MUSCLE ONCE FOR 1 DOSE, Disp: 2 each, Rfl: 0  •  glucose blood (OneTouch Ultra) test strip, USE TO TEST ONCE DAILY, Disp: 100 strip, Rfl: 1  •  Lancets (onetouch ultrasoft) lancets, Test once daily, Disp: 100 each, Rfl: 3    Allergies   Allergen Reactions   • Shellfish-Derivedproducts [Shellfish-Derived Products - Food Allergy] Anaphylaxis   • Phenytoin  "Hives   • Shellfish Allergy - Food Allergy Other (See Comments)       Physical Exam:    Pulse 103   Temp 97.8 °F (36.6 °C)   Ht 5' 3\" (1.6 m)   Wt 68.5 kg (151 lb)   SpO2 97%   BMI 26.75 kg/m²     Constitutional:normal, well developed, well nourished, alert, in no distress and non-toxic and no overt pain behavior.  Eyes:anicteric  HEENT:grossly intact  Pulmonary:even and unlabored  Cardiovascular:No edema or pitting edema present  Skin:Normal without rashes or lesions and well hydrated  Psychiatric:Mood and affect appropriate  Neurologic:Cranial Nerves II-XII grossly intact  Musculoskeletal: Stable gait without the use of assistive devices      Imaging  No orders to display         No orders of the defined types were placed in this encounter.      "

## 2025-03-05 ENCOUNTER — PATIENT MESSAGE (OUTPATIENT)
Dept: FAMILY MEDICINE CLINIC | Facility: CLINIC | Age: 65
End: 2025-03-05

## 2025-03-05 ENCOUNTER — TELEPHONE (OUTPATIENT)
Age: 65
End: 2025-03-05

## 2025-03-05 NOTE — TELEPHONE ENCOUNTER
Ruby called stating that she saw Jamila Perezjake yesterday and was prescribed diflucan and when she went to pick it up at the pharmacy the pharmacist wanted to know if the provider advised her to stop taking her atorvastatin.    Ruby stated that she has PT today from 10-11:30 so please leave a message if you reach out during that time frame.    Please advise out to Ruby to let her know if she should stop the atorvastatin and start the diflucan.

## 2025-03-06 ENCOUNTER — RESULTS FOLLOW-UP (OUTPATIENT)
Dept: ENDOCRINOLOGY | Facility: CLINIC | Age: 65
End: 2025-03-06

## 2025-03-06 LAB
ALBUMIN SERPL-MCNC: 4.6 G/DL (ref 3.9–4.9)
ALBUMIN/CREAT UR: <5 MG/G CREAT (ref 0–29)
ALP SERPL-CCNC: 128 IU/L (ref 44–121)
ALT SERPL-CCNC: 30 IU/L (ref 0–32)
AST SERPL-CCNC: 25 IU/L (ref 0–40)
BASOPHILS # BLD AUTO: 0.1 X10E3/UL (ref 0–0.2)
BASOPHILS NFR BLD AUTO: 1 %
BILIRUB SERPL-MCNC: 0.3 MG/DL (ref 0–1.2)
BUN SERPL-MCNC: 23 MG/DL (ref 8–27)
BUN/CREAT SERPL: 30 (ref 12–28)
CALCIUM SERPL-MCNC: 9.4 MG/DL (ref 8.7–10.3)
CHLORIDE SERPL-SCNC: 105 MMOL/L (ref 96–106)
CHOLEST SERPL-MCNC: 168 MG/DL (ref 100–199)
CO2 SERPL-SCNC: 21 MMOL/L (ref 20–29)
CREAT SERPL-MCNC: 0.77 MG/DL (ref 0.57–1)
CREAT UR-MCNC: 61.4 MG/DL
EGFR: 85 ML/MIN/1.73
EOSINOPHIL # BLD AUTO: 0.6 X10E3/UL (ref 0–0.4)
EOSINOPHIL NFR BLD AUTO: 8 %
ERYTHROCYTE [DISTWIDTH] IN BLOOD BY AUTOMATED COUNT: 12.7 % (ref 11.7–15.4)
EST. AVERAGE GLUCOSE BLD GHB EST-MCNC: 174 MG/DL
GLOBULIN SER-MCNC: 2.4 G/DL (ref 1.5–4.5)
GLUCOSE SERPL-MCNC: 117 MG/DL (ref 70–99)
HBA1C MFR BLD: 7.7 % (ref 4.8–5.6)
HCT VFR BLD AUTO: 41.1 % (ref 34–46.6)
HDLC SERPL-MCNC: 39 MG/DL
HGB BLD-MCNC: 13.3 G/DL (ref 11.1–15.9)
IMM GRANULOCYTES # BLD: 0 X10E3/UL (ref 0–0.1)
IMM GRANULOCYTES NFR BLD: 0 %
LDLC SERPL CALC-MCNC: 73 MG/DL (ref 0–99)
LDLC/HDLC SERPL: 1.9 RATIO (ref 0–3.2)
LYMPHOCYTES # BLD AUTO: 2.1 X10E3/UL (ref 0.7–3.1)
LYMPHOCYTES NFR BLD AUTO: 29 %
MCH RBC QN AUTO: 30.3 PG (ref 26.6–33)
MCHC RBC AUTO-ENTMCNC: 32.4 G/DL (ref 31.5–35.7)
MCV RBC AUTO: 94 FL (ref 79–97)
MICROALBUMIN UR-MCNC: <3 UG/ML
MONOCYTES # BLD AUTO: 0.5 X10E3/UL (ref 0.1–0.9)
MONOCYTES NFR BLD AUTO: 6 %
NEUTROPHILS # BLD AUTO: 4 X10E3/UL (ref 1.4–7)
NEUTROPHILS NFR BLD AUTO: 56 %
PLATELET # BLD AUTO: 252 X10E3/UL (ref 150–450)
POTASSIUM SERPL-SCNC: 4.7 MMOL/L (ref 3.5–5.2)
PROT SERPL-MCNC: 7 G/DL (ref 6–8.5)
RBC # BLD AUTO: 4.39 X10E6/UL (ref 3.77–5.28)
SL AMB VLDL CHOLESTEROL CALC: 56 MG/DL (ref 5–40)
SODIUM SERPL-SCNC: 143 MMOL/L (ref 134–144)
TRIGL SERPL-MCNC: 349 MG/DL (ref 0–149)
TSH SERPL DL<=0.005 MIU/L-ACNC: 2.62 UIU/ML (ref 0.45–4.5)
WBC # BLD AUTO: 7.2 X10E3/UL (ref 3.4–10.8)

## 2025-03-07 ENCOUNTER — TELEPHONE (OUTPATIENT)
Age: 65
End: 2025-03-07

## 2025-03-07 NOTE — TELEPHONE ENCOUNTER
Shubham from [facility] calling in because they received a sample from the office with no test indicated and wanted to confirm what it was regarding. Tried to warm transfer to office to confirm but unavailable at the time and then Shubham disconnected from the call.    Please advise if anything was sent out to lab for pt.

## 2025-03-10 ENCOUNTER — TELEPHONE (OUTPATIENT)
Age: 65
End: 2025-03-10

## 2025-03-10 NOTE — TELEPHONE ENCOUNTER
Called [facility] to see what the question was in regards to the Vaginosis test.  The test we selected was the incorrect one.  Given the correct number to complete est.

## 2025-03-10 NOTE — TELEPHONE ENCOUNTER
Caller: Pt    Doctor: Dr. Flor Boston    Reason for call: Pt is requesting another script for Physical Therapy. Pt states its helping her.    [facility]  .   Fax [phone]    Call back#: [phone]

## 2025-03-11 DIAGNOSIS — M54.16 LUMBAR RADICULOPATHY: ICD-10-CM

## 2025-03-11 DIAGNOSIS — M47.816 LUMBAR SPONDYLOSIS: ICD-10-CM

## 2025-03-11 DIAGNOSIS — M46.1 SACROILIITIS (HCC): Primary | ICD-10-CM

## 2025-03-11 DIAGNOSIS — M96.1 LUMBAR POSTLAMINECTOMY SYNDROME: ICD-10-CM

## 2025-03-13 ENCOUNTER — OFFICE VISIT (OUTPATIENT)
Dept: ENDOCRINOLOGY | Facility: CLINIC | Age: 65
End: 2025-03-13
Payer: MEDICARE

## 2025-03-13 VITALS
HEIGHT: 63 IN | DIASTOLIC BLOOD PRESSURE: 80 MMHG | SYSTOLIC BLOOD PRESSURE: 122 MMHG | HEART RATE: 90 BPM | BODY MASS INDEX: 27.43 KG/M2 | WEIGHT: 154.8 LBS

## 2025-03-13 DIAGNOSIS — E11.42 TYPE 2 DIABETES MELLITUS WITH DIABETIC POLYNEUROPATHY, WITHOUT LONG-TERM CURRENT USE OF INSULIN (HCC): Primary | ICD-10-CM

## 2025-03-13 DIAGNOSIS — E78.5 HYPERLIPIDEMIA, UNSPECIFIED HYPERLIPIDEMIA TYPE: ICD-10-CM

## 2025-03-13 DIAGNOSIS — I10 HYPERTENSION, UNSPECIFIED TYPE: ICD-10-CM

## 2025-03-13 PROCEDURE — 99214 OFFICE O/P EST MOD 30 MIN: CPT

## 2025-03-13 PROCEDURE — G2211 COMPLEX E/M VISIT ADD ON: HCPCS

## 2025-03-13 RX ORDER — OMEGA-3-ACID ETHYL ESTERS 1 G/1
2 CAPSULE, LIQUID FILLED ORAL 2 TIMES DAILY
Qty: 120 CAPSULE | Refills: 2 | Status: SHIPPED | OUTPATIENT
Start: 2025-03-13

## 2025-03-13 RX ORDER — PIOGLITAZONE 15 MG/1
15 TABLET ORAL DAILY
Qty: 30 TABLET | Refills: 2 | Status: SHIPPED | OUTPATIENT
Start: 2025-03-13

## 2025-03-13 NOTE — ASSESSMENT & PLAN NOTE
Poorly controlled  Will add Actos once daily. Side effects reviewed.   Goal A1C <7%  Continue lifestyle modifications    Lab Results   Component Value Date    HGBA1C 7.7 (H) 03/04/2025       Orders:    pioglitazone (ACTOS) 15 mg tablet; Take 1 tablet (15 mg total) by mouth daily    Hemoglobin A1C; Future    Comprehensive metabolic panel; Future

## 2025-03-13 NOTE — PROGRESS NOTES
Name: Jada Hui      : 1960      MRN: [id number]  Encounter Provider: ERI Garrison  Encounter Date: 3/13/2025   Encounter department: [facility] [place]    Chief Complaint   Patient presents with    Diabetes Type 2   :  Assessment & Plan  Type 2 diabetes mellitus with diabetic polyneuropathy, without long-term current use of insulin (HCC)  Poorly controlled  Will add Actos once daily. Side effects reviewed.   Goal A1C <7%  Continue lifestyle modifications    Lab Results   Component Value Date    HGBA1C 7.7 (H) 2025       Orders:    pioglitazone (ACTOS) 15 mg tablet; Take 1 tablet (15 mg total) by mouth daily    Hemoglobin A1C; Future    Comprehensive metabolic panel; Future    Hyperlipidemia, unspecified hyperlipidemia type  Triglycerides elevated. Advise she monitor diet. Switch OTC fish oil to prescription. Repeat lab work prior to next visit. Continue statin  Orders:    omega-3-acid ethyl esters (LOVAZA) 1 g capsule; Take 2 capsules (2 g total) by mouth 2 (two) times a day    Lipid panel; Future    Hypertension, unspecified type  Controlled in office. Continue regimen           History of Present Illness   History of Present Illness    Jada Hui is a 64 y.o. female with type 2 diabetes seen in follow up. Reports complications of TIA. Denies recent severe hypoglycemic or severe hyperglycemic episodes. Denies any issues with her current regimen. Last A1C was 7.7. Denies recent illness, hospitalization or steroid use.     Has shellfish allergy but only to shrimp- able to have crab, lobster     Has started exercises 3 weeks ago. Diet could be better    Home blood glucometer readings:   Before breakfast: 111-192  After breakfast: 166, 179, 182    Current regimen:   Jardiance-Tradjenta 25-5 mg, one tab daily  Glimepiride 4 mg BID   Metformin 1,000 mg BID    Last Eye Exam: 10/07/2024  Last Foot Exam: 05/15/2024  Health Maintenance   Topic Date  "Due    Diabetic Foot Exam  05/15/2025    Diabetic Eye Exam  10/07/2026     Pertinent Medical History   Did not tolerate Ozempic- stroke like symptoms   History of gastroparesis- not a GLP1 candidate   Review of Systems   Constitutional:  Negative for chills and fever.   HENT:  Negative for ear pain and sore throat.    Eyes:  Negative for pain and visual disturbance.   Respiratory:  Negative for cough and shortness of breath.    Cardiovascular:  Negative for chest pain and palpitations.   Gastrointestinal:  Negative for abdominal pain and vomiting.   Genitourinary:  Negative for dysuria and hematuria.   Musculoskeletal:  Negative for arthralgias and back pain.   Skin:  Negative for color change and rash.   Neurological:  Negative for seizures and syncope.   All other systems reviewed and are negative.   as per HPI         Medical History Reviewed by provider this encounter:     .    Objective   /80   Pulse 90   Ht 5' 3\" (1.6 m)   Wt 70.2 kg (154 lb 12.8 oz)   BMI 27.42 kg/m²      Body mass index is 27.42 kg/m².  Wt Readings from Last 3 Encounters:   03/13/25 70.2 kg (154 lb 12.8 oz)   03/04/25 68.7 kg (151 lb 6.4 oz)   03/04/25 68.5 kg (151 lb)     Physical Exam    Physical Exam  Vitals reviewed.   HENT:      Head: Normocephalic and atraumatic.   Cardiovascular:      Rate and Rhythm: Normal rate.   Pulmonary:      Effort: Pulmonary effort is normal.   Neurological:      Mental Status: She is alert.       Results    Labs:   Lab Results   Component Value Date    HGBA1C 7.7 (H) 03/04/2025    HGBA1C 7.4 (H) 10/07/2024    HGBA1C 6.8 (H) 04/08/2024     Lab Results   Component Value Date    CREATININE 0.77 03/04/2025    CREATININE 0.98 10/07/2024    CREATININE 0.78 04/08/2024    BUN 23 03/04/2025    K 4.7 03/04/2025     03/04/2025    CO2 21 03/04/2025     eGFR   Date Value Ref Range Status   03/04/2025 85 >59 mL/min/1.73 Final   09/23/2023 82 ml/min/1.73sq m Final     Lab Results   Component Value Date    " HDL 39 (L) 03/04/2025    TRIG 349 (H) 03/04/2025     Lab Results   Component Value Date    ALT 30 03/04/2025    AST 25 03/04/2025    ALKPHOS 100 09/23/2023     Lab Results   Component Value Date    XAV5ZXVVEZIQ 2.455 09/23/2023       Patient Instructions   N[place]ic natural- fish oil- 2g twice per day (total 4 g daily)       Discussed with the patient and all questioned fully answered. She will call me if any problems arise.

## 2025-03-13 NOTE — ASSESSMENT & PLAN NOTE
Triglycerides elevated. Advise she monitor diet. Switch OTC fish oil to prescription. Repeat lab work prior to next visit. Continue statin  Orders:    omega-3-acid ethyl esters (LOVAZA) 1 g capsule; Take 2 capsules (2 g total) by mouth 2 (two) times a day    Lipid panel; Future

## 2025-03-20 LAB
A VAGINAE DNA VAG QL NAA+PROBE: NORMAL SCORE
BVAB2 DNA VAG QL NAA+PROBE: NORMAL SCORE
C ALBICANS DNA VAG QL NAA+PROBE: NEGATIVE
C GLABRATA DNA VAG QL NAA+PROBE: NEGATIVE
MEGA1 DNA VAG QL NAA+PROBE: NORMAL SCORE
T VAGINALIS RRNA SPEC QL NAA+PROBE: NEGATIVE

## 2025-03-21 ENCOUNTER — RESULTS FOLLOW-UP (OUTPATIENT)
Dept: FAMILY MEDICINE CLINIC | Facility: CLINIC | Age: 65
End: 2025-03-21

## 2025-03-25 NOTE — PATIENT COMMUNICATION
Pt states her symptoms of itching , rash and irritation came back a week after finishing Diflican. Please advise

## 2025-03-27 ENCOUNTER — NURSE TRIAGE (OUTPATIENT)
Age: 65
End: 2025-03-27

## 2025-03-27 NOTE — TELEPHONE ENCOUNTER
"FOLLOW UP: office visit 4/8    REASON FOR CONVERSATION: Vaginal Itching    SYMPTOMS: vaginal itching    OTHER: Patient complains of continued vagina itching and irritation. This began the beginning of march after being on antibiotics. Was seen by PCP, vaginitis swab was negative at that time. Was treated with diflucan x2, symptoms were managed for about 2 weeks, but have now returned. Itching gets sever, denies vaginal discharge, odor or pain. Was advised by PCP to follow up with GYN for recs. Appt scheduled 4/8, routing to provider for further recs as patient is uncomfortable.     DISPOSITION: No disposition on file.      Reason for Disposition   MODERATE-SEVERE itching (i.e., interferes with school, work, or sleep)    Answer Assessment - Initial Assessment Questions  1. SYMPTOM: \"What's the main symptom you're concerned about?\" (e.g., pain, itching, dryness)      Vaginal itching following antibiotics for one month after 2 doses of diflucan  2. LOCATION: \"Where is the  itching located?\" (e.g., inside/outside, left/right)      vulva  3. ONSET: \"When did the  symptoms  start?\"      1 month ago  4. PAIN: \"Is there any pain?\" If Yes, ask: \"How bad is it?\" (Scale: 1-10; mild, moderate, severe)      Denies, just irritation  5. ITCHING: \"Is there any itching?\" If Yes, ask: \"How bad is it?\" (Scale: 1-10; mild, moderate, severe)      severe  6. CAUSE: \"What do you think is causing the discharge?\" \"Have you had the same problem before?\" \"What happened then?\"      denies  7. OTHER SYMPTOMS: \"Do you have any other symptoms?\" (e.g., fever, itching, vaginal bleeding, pain with urination, injury to genital area, vaginal foreign body)      denies  8. PREGNANCY: \"Is there any chance you are pregnant?\" \"When was your last menstrual period?\"      postmenopausal    Protocols used: Vaginal Symptoms-Adult-[place]    "

## 2025-03-27 NOTE — TELEPHONE ENCOUNTER
Per Dr. Shore- I looked at her swabs and they were normal.  If the itching is external, she can try Monistat or Gyne-Lotrimin and use it externally twice a day for a week.  I am back in the office next week and Supriya is here on Wednesday if that works.   12:53 call back to patient, we discuss Dr. Shore notes with recommendations for follow up. Patient states she has been using Vagisil. We discussed the difference between Vagisil and Monistat and Gyn-Lotrim and patient verbalzied understanding. She will use one of these as provider suggested and we discussed calling back if symptoms do not improve for office visit. Patient verbalzied understanding.

## 2025-03-28 ENCOUNTER — RA CDI HCC (OUTPATIENT)
Dept: OTHER | Facility: HOSPITAL | Age: 65
End: 2025-03-28

## 2025-03-31 ENCOUNTER — OFFICE VISIT (OUTPATIENT)
Dept: GYNECOLOGY | Facility: CLINIC | Age: 65
End: 2025-03-31
Payer: MEDICARE

## 2025-03-31 VITALS — SYSTOLIC BLOOD PRESSURE: 118 MMHG | BODY MASS INDEX: 27.63 KG/M2 | DIASTOLIC BLOOD PRESSURE: 64 MMHG | WEIGHT: 156 LBS

## 2025-03-31 DIAGNOSIS — R10.2 PELVIC PAIN: ICD-10-CM

## 2025-03-31 DIAGNOSIS — N76.2 ACUTE VULVITIS: Primary | ICD-10-CM

## 2025-03-31 LAB
BACTERIA UR QL AUTO: NORMAL /HPF
BILIRUB UR QL STRIP: NEGATIVE
BV WHIFF TEST VAG QL: NEGATIVE
CLARITY UR: CLEAR
CLUE CELLS SPEC QL WET PREP: NEGATIVE
COLOR UR: YELLOW
GLUCOSE UR STRIP-MCNC: ABNORMAL MG/DL
HGB UR QL STRIP.AUTO: NEGATIVE
KETONES UR STRIP-MCNC: NEGATIVE MG/DL
LEUKOCYTE ESTERASE UR QL STRIP: ABNORMAL
NITRITE UR QL STRIP: NEGATIVE
NON-SQ EPI CELLS URNS QL MICRO: NORMAL /HPF
PH SMN: 5 [PH]
PH UR STRIP.AUTO: 6 [PH]
PROT UR STRIP-MCNC: NEGATIVE MG/DL
RBC #/AREA URNS AUTO: NORMAL /HPF
SL AMB POCT WET MOUNT: NORMAL
SP GR UR STRIP.AUTO: 1.03 (ref 1–1.03)
T VAGINALIS VAG QL WET PREP: NEGATIVE
UROBILINOGEN UR STRIP-ACNC: <2 MG/DL
WBC #/AREA URNS AUTO: NORMAL /HPF
YEAST VAG QL WET PREP: NEGATIVE

## 2025-03-31 PROCEDURE — 99213 OFFICE O/P EST LOW 20 MIN: CPT | Performed by: OBSTETRICS & GYNECOLOGY

## 2025-03-31 PROCEDURE — 87210 SMEAR WET MOUNT SALINE/INK: CPT | Performed by: OBSTETRICS & GYNECOLOGY

## 2025-03-31 PROCEDURE — 81001 URINALYSIS AUTO W/SCOPE: CPT | Performed by: OBSTETRICS & GYNECOLOGY

## 2025-03-31 NOTE — PROGRESS NOTES
Name: Jada Hui      : 1960      MRN: [id number]  Encounter Provider: Milly Shore DO  Encounter Date: 3/31/2025   Encounter department: [facility]  :  Assessment & Plan  Acute vulvitis    Orders:    POCT wet mount    Pelvic pain    Orders:    US pelvis complete w transvaginal; Future    UA w Reflex to Microscopic w Reflex to Culture        Suprapubic pressure-will send clean-catch urinalysis.      Pain just to the right of midline -exam is normal however ultrasound ordered.  She will schedule this    Vulvar itching-this does seem to be getting better.  Normal wet mount, it is most likely  partially treated vulvar candidiasis, finish 7 days of external monistat cream, no vaginal needed  Call if no relief    She will schedule yearly as she is overdue.    History of Present Illness   HPI  Jada Hui is a 64 y.o. female who presents for evaluation of vaginal infection.  This started a month ago when she took antibiotics.  She was on doxycycline and amoxicillin for an upper respiratory infection.  She started having external itching and took Diflucan twice.  She had a vaginal culture which was normal and did not show BV or yeast.  The itching recurred and she started using Monistat 3 and then last night, she started on Monistat 7.  She has used Monistat for total of 4 days.  She denies vaginal discharge or odor.    She has had suprapubic pressure for about 5 or 6 days, no dysuria.        Review of Systems   Constitutional: Negative.    Gastrointestinal: Negative.    Genitourinary:  Positive for pelvic pain.        Vulvar itching          Objective   There were no vitals taken for this visit.     Physical Exam  Genitourinary:     General: Normal vulva.      Vagina: Normal.      Cervix: Normal.      Uterus: Normal.       Adnexa: Right adnexa normal and left adnexa normal.      Comments: Very minimal erythema on inner labia and around introitus, no lesions  Minimal vaginal  discharge noted  Some discomfort on bimanual just to the right of midline.  No adnexal masses or tenderness noted

## 2025-04-01 ENCOUNTER — RESULTS FOLLOW-UP (OUTPATIENT)
Dept: GYNECOLOGY | Facility: CLINIC | Age: 65
End: 2025-04-01

## 2025-04-01 ENCOUNTER — OFFICE VISIT (OUTPATIENT)
Dept: FAMILY MEDICINE CLINIC | Facility: CLINIC | Age: 65
End: 2025-04-01
Payer: MEDICARE

## 2025-04-01 VITALS
HEIGHT: 63 IN | HEART RATE: 99 BPM | WEIGHT: 151.6 LBS | DIASTOLIC BLOOD PRESSURE: 62 MMHG | TEMPERATURE: 98.3 F | SYSTOLIC BLOOD PRESSURE: 114 MMHG | BODY MASS INDEX: 26.86 KG/M2 | OXYGEN SATURATION: 97 % | RESPIRATION RATE: 16 BRPM

## 2025-04-01 DIAGNOSIS — Z23 ENCOUNTER FOR IMMUNIZATION: ICD-10-CM

## 2025-04-01 DIAGNOSIS — Z00.00 MEDICARE ANNUAL WELLNESS VISIT, INITIAL: Primary | ICD-10-CM

## 2025-04-01 PROCEDURE — 90707 MMR VACCINE SC: CPT

## 2025-04-01 PROCEDURE — 90471 IMMUNIZATION ADMIN: CPT

## 2025-04-01 PROCEDURE — G0438 PPPS, INITIAL VISIT: HCPCS | Performed by: FAMILY MEDICINE

## 2025-04-01 NOTE — PROGRESS NOTES
Name: Jada Hui      : 1960      MRN: [id number]  Encounter Provider: Urbano Goncalves DO  Encounter Date: 2025   Encounter department: [facility]    Assessment & Plan  Medicare annual wellness visit, initial    Medicare wellness visit completed  Patient updated health maintenance  Blood pressure vital signs are stable labs are up-to-date but patient follows with endocrinology  Discussed her minor depression at this point patient does not wish to seek any treatment at this time but will continue to monitor and follow-up if needed  Otherwise she can follow-up in 1 year  She is up-to-date health maintenance preventative maintenance and at disregards given  MMR vaccine given today as she is in the age range for a booster             Depression Screening and Follow-up Plan: Patient was screened for depression during today's encounter. They screened negative with a PHQ-2 score of 1.        Preventive health issues were discussed with patient, and age appropriate screening tests were ordered as noted in patient's After Visit Summary. Personalized health advice and appropriate referrals for health education or preventive services given if needed, as noted in patient's After Visit Summary.    History of Present Illness     Patient presents today for Medicare wellness visit  Overall she is doing well  We did discuss some minor depression issues  She is following with endocrinology her sugars were slightly up from last time  No other issues today       Patient Care Team:  Urbano Goncalves DO as PCP - General (Family Medicine)  Rob Zuniga DO as PCP - Endocrinology (Endocrinology)  ERI Garrison as Nurse Practitioner (Endocrinology)  Milly Shore DO (Obstetrics and Gynecology)    Review of Systems   Constitutional: Negative.    HENT: Negative.     Eyes: Negative.    Respiratory: Negative.     Cardiovascular: Negative.    Gastrointestinal: Negative.    Endocrine: Negative.     Genitourinary: Negative.    Musculoskeletal: Negative.    Skin: Negative.    Allergic/Immunologic: Negative.    Neurological: Negative.    Hematological: Negative.    Psychiatric/Behavioral: Negative.     All other systems reviewed and are negative.    Medical History Reviewed by provider this encounter:       Annual Wellness Visit Questionnaire   Jada is here for her Initial Wellness visit. Last Medicare Wellness visit information reviewed, patient interviewed and updates made to the record today.      Health Risk Assessment:   Patient rates overall health as good. Patient feels that their physical health rating is same. Patient is satisfied with their life. Eyesight was rated as same. Hearing was rated as same. Patient feels that their emotional and mental health rating is same. Patients states they are never, rarely angry. Patient states they are never, rarely unusually tired/fatigued. Pain experienced in the last 7 days has been none. Patient states that she has experienced no weight loss or gain in last 6 months.     Depression Screening:   PHQ-2 Score: 1      Fall Risk Screening:   In the past year, patient has experienced: no history of falling in past year      Urinary Incontinence Screening:   Patient has not leaked urine accidently in the last six months.     Home Safety:  Patient does not have trouble with stairs inside or outside of their home. Patient has working smoke alarms and has working carbon monoxide detector. Home safety hazards include: none.     Nutrition:   Current diet is Regular.     Medications:   Patient is currently taking over-the-counter supplements. OTC medications include: see medication list. Patient is able to manage medications.     Activities of Daily Living (ADLs)/Instrumental Activities of Daily Living (IADLs):   Walk and transfer into and out of bed and chair?: Yes  Dress and groom yourself?: Yes    Bathe or shower yourself?: Yes    Feed yourself? Yes  Do your  laundry/housekeeping?: Yes  Manage your money, pay your bills and track your expenses?: Yes  Make your own meals?: Yes    Do your own shopping?: Yes    Previous Hospitalizations:   Any hospitalizations or ED visits within the last 12 months?: No      Advance Care Planning:   Living will: Yes    Durable POA for healthcare: Yes    Advanced directive: Yes    Advanced directive counseling given: Yes    End of Life Decisions reviewed with patient: Yes    Provider agrees with end of life decisions: Yes      Cognitive Screening:   Provider or family/friend/caregiver concerned regarding cognition?: No    PREVENTIVE SCREENINGS      Cardiovascular Screening:    General: History Lipid Disorder and Screening Current      Diabetes Screening:     General: History Diabetes and Screening Current      Colorectal Cancer Screening:     General: Screening Current      Breast Cancer Screening:     General: Screening Current      Cervical Cancer Screening:    General: Screening Current      Osteoporosis Screening:    General: Screening Current      Abdominal Aortic Aneurysm (AAA) Screening:        General: Screening Current      Lung Cancer Screening:     General: Screening Not Indicated      Hepatitis C Screening:    General: Screening Not Indicated    Screening, Brief Intervention, and Referral to Treatment (SBIRT)     Screening    Typical number of drinks in a week: 0    Single Item Drug Screening:  How often have you used an illegal drug (including marijuana) or a prescription medication for non-medical reasons in the past year? never    Single Item Drug Screen Score: 0  Interpretation: Negative screen for possible drug use disorder    Brief Intervention  Alcohol & drug use screenings were reviewed. No concerns regarding substance use disorder identified.     Other Counseling Topics:   Car/seat belt/driving safety, skin self-exam, sunscreen and regular weightbearing exercise and calcium and vitamin D intake.     Social Drivers of  "Health     Financial Resource Strain: Low Risk  (3/28/2024)    Overall Financial Resource Strain (CARDIA)     Difficulty of Paying Living Expenses: Not hard at all   Food Insecurity: No Food Insecurity (4/1/2025)    Hunger Vital Sign     Worried About Running Out of Food in the Last Year: Never true     Ran Out of Food in the Last Year: Never true   Transportation Needs: No Transportation Needs (4/1/2025)    PRAPARE - Transportation     Lack of Transportation (Medical): No     Lack of Transportation (Non-Medical): No   Housing Stability: Low Risk  (4/1/2025)    Housing Stability Vital Sign     Unable to Pay for Housing in the Last Year: No     Number of Times Moved in the Last Year: 0     Homeless in the Last Year: No   Utilities: Not At Risk (4/1/2025)    [facility] Utilities     Threatened with loss of utilities: No     No results found.    Objective   /62 (BP Location: Left arm, Patient Position: Sitting, Cuff Size: Standard)   Pulse 99   Temp 98.3 °F (36.8 °C) (Tympanic)   Resp 16   Ht 5' 3\" (1.6 m)   Wt 68.8 kg (151 lb 9.6 oz)   SpO2 97%   BMI 26.85 kg/m²     Physical Exam  Vitals and nursing note reviewed.   Constitutional:       Appearance: Normal appearance. She is well-developed.   HENT:      Head: Normocephalic.      Right Ear: External ear normal.      Left Ear: External ear normal.      Nose: Nose normal.   Eyes:      Conjunctiva/sclera: Conjunctivae normal.      Pupils: Pupils are equal, round, and reactive to light.   Cardiovascular:      Rate and Rhythm: Normal rate and regular rhythm.      Pulses: no weak pulses.           Dorsalis pedis pulses are 2+ on the right side and 2+ on the left side.        Posterior tibial pulses are 2+ on the right side and 2+ on the left side.      Heart sounds: Normal heart sounds.   Pulmonary:      Effort: Pulmonary effort is normal.      Breath sounds: Normal breath sounds.   Abdominal:      General: Bowel sounds are normal.      Palpations: Abdomen is soft. "   Musculoskeletal:         General: Normal range of motion.      Cervical back: Normal range of motion and neck supple.   Feet:      Right foot:      Skin integrity: No ulcer, skin breakdown, erythema, warmth, callus or dry skin.      Left foot:      Skin integrity: No ulcer, skin breakdown, erythema, warmth, callus or dry skin.   Skin:     General: Skin is warm and dry.   Neurological:      General: No focal deficit present.      Mental Status: She is alert and oriented to person, place, and time.   Psychiatric:         Behavior: Behavior normal.         Thought Content: Thought content normal.         Judgment: Judgment normal.           Diabetic Foot Exam    Patient's shoes and socks removed.    Right Foot/Ankle   Right Foot Inspection  Skin Exam: skin normal. Skin not intact, no dry skin, no warmth, no callus, no erythema, no maceration, no abnormal color, no pre-ulcer, no ulcer and no callus.     Toe Exam: ROM and strength within normal limits.     Sensory   Vibration: intact  Proprioception: intact  Monofilament testing: intact    Vascular  Capillary refills: < 3 seconds  The right DP pulse is 2+. The right PT pulse is 2+.     Left Foot/Ankle  Left Foot Inspection  Skin Exam: skin normal. Skin not intact, no dry skin, no warmth, no erythema, no maceration, normal color, no pre-ulcer, no ulcer and no callus.     Toe Exam: ROM and strength within normal limits.     Sensory   Vibration: intact  Proprioception: intact  Monofilament testing: intact    Vascular  Capillary refills: < 3 seconds  The left DP pulse is 2+. The left PT pulse is 2+.     Assign Risk Category  No deformity present  No loss of protective sensation  No weak pulses  Risk: 0

## 2025-04-01 NOTE — PATIENT INSTRUCTIONS
Medicare Preventive Visit Patient Instructions  Thank you for completing your Welcome to Medicare Visit or Medicare Annual Wellness Visit today. Your next wellness visit will be due in one year (4/2/2026).  The screening/preventive services that you may require over the next 5-10 years are detailed below. Some tests may not apply to you based off risk factors and/or age. Screening tests ordered at today's visit but not completed yet may show as past due. Also, please note that scanned in results may not display below.  Preventive Screenings:  Service Recommendations Previous Testing/Comments   Colorectal Cancer Screening  * Colonoscopy    * Fecal Occult Blood Test (FOBT)/Fecal Immunochemical Test (FIT)  * Fecal DNA/Cologuard Test  * Flexible Sigmoidoscopy Age: 45-75 years old   Colonoscopy: every 10 years (may be performed more frequently if at higher risk)  OR  FOBT/FIT: every 1 year  OR  Cologuard: every 3 years  OR  Sigmoidoscopy: every 5 years  Screening may be recommended earlier than age 45 if at higher risk for colorectal cancer. Also, an individualized decision between you and your healthcare provider will decide whether screening between the ages of 76-85 would be appropriate. Colonoscopy: 11/26/2018  FOBT/FIT: Not on file  Cologuard: Not on file  Sigmoidoscopy: Not on file          Breast Cancer Screening Age: 40+ years old  Frequency: every 1-2 years  Not required if history of left and right mastectomy Mammogram: 10/28/2024        Cervical Cancer Screening Between the ages of 21-29, pap smear recommended once every 3 years.   Between the ages of 30-65, can perform pap smear with HPV co-testing every 5 years.   Recommendations may differ for women with a history of total hysterectomy, cervical cancer, or abnormal pap smears in past. Pap Smear: 11/06/2023        Hepatitis C Screening Once for adults born between 1945 and 1965  More frequently in patients at high risk for Hepatitis C Hep C Antibody: Not on  file        Diabetes Screening 1-2 times per year if you're at risk for diabetes or have pre-diabetes Fasting glucose: 150 mg/dL (9/23/2023)  A1C: 7.7 % (3/4/2025)      Cholesterol Screening Once every 5 years if you don't have a lipid disorder. May order more often based on risk factors. Lipid panel: 03/04/2025          Other Preventive Screenings Covered by Medicare:  Abdominal Aortic Aneurysm (AAA) Screening: covered once if your at risk. You're considered to be at risk if you have a family history of AAA.  Lung Cancer Screening: covers low dose CT scan once per year if you meet all of the following conditions: (1) Age 55-77; (2) No signs or symptoms of lung cancer; (3) Current smoker or have quit smoking within the last 15 years; (4) You have a tobacco smoking history of at least 20 pack years (packs per day multiplied by number of years you smoked); (5) You get a written order from a healthcare provider.  Glaucoma Screening: covered annually if you're considered high risk: (1) You have diabetes OR (2) Family history of glaucoma OR (3)  aged 50 and older OR (4)  American aged 65 and older  Osteoporosis Screening: covered every 2 years if you meet one of the following conditions: (1) You're estrogen deficient and at risk for osteoporosis based off medical history and other findings; (2) Have a vertebral abnormality; (3) On glucocorticoid therapy for more than 3 months; (4) Have primary hyperparathyroidism; (5) On osteoporosis medications and need to assess response to drug therapy.   Last bone density test (DXA Scan): 10/28/2024.  HIV Screening: covered annually if you're between the age of 15-65. Also covered annually if you are younger than 15 and older than 65 with risk factors for HIV infection. For pregnant patients, it is covered up to 3 times per pregnancy.    Immunizations:  Immunization Recommendations   Influenza Vaccine Annual influenza vaccination during flu season is  recommended for all persons aged >= 6 months who do not have contraindications   Pneumococcal Vaccine   * Pneumococcal conjugate vaccine = PCV13 (Prevnar 13), PCV15 (Vaxneuvance), PCV20 (Prevnar 20)  * Pneumococcal polysaccharide vaccine = PPSV23 (Pneumovax) Adults 19-63 yo with certain risk factors or if 65+ yo  If never received any pneumonia vaccine: recommend Prevnar 20 (PCV20)  Give PCV20 if previously received 1 dose of PCV13 or PPSV23   Hepatitis B Vaccine 3 dose series if at intermediate or high risk (ex: diabetes, end stage renal disease, liver disease)   Respiratory syncytial virus (RSV) Vaccine - COVERED BY MEDICARE PART D  * RSVPreF3 (Arexvy) CDC recommends that adults 60 years of age and older may receive a single dose of RSV vaccine using shared clinical decision-making (SCDM)   Tetanus (Td) Vaccine - COST NOT COVERED BY MEDICARE PART B Following completion of primary series, a booster dose should be given every 10 years to maintain immunity against tetanus. Td may also be given as tetanus wound prophylaxis.   Tdap Vaccine - COST NOT COVERED BY MEDICARE PART B Recommended at least once for all adults. For pregnant patients, recommended with each pregnancy.   Shingles Vaccine (Shingrix) - COST NOT COVERED BY MEDICARE PART B  2 shot series recommended in those 19 years and older who have or will have weakened immune systems or those 50 years and older     Health Maintenance Due:      Topic Date Due   • Hepatitis C Screening  Never done   • Breast Cancer Screening: Mammogram  10/28/2025   • Colorectal Cancer Screening  11/26/2028     Immunizations Due:  There are no preventive care reminders to display for this patient.  Advance Directives   What are advance directives?  Advance directives are legal documents that state your wishes and plans for medical care. These plans are made ahead of time in case you lose your ability to make decisions for yourself. Advance directives can apply to any medical  decision, such as the treatments you want, and if you want to donate organs.   What are the types of advance directives?  There are many types of advance directives, and each state has rules about how to use them. You may choose a combination of any of the following:  Living will:  This is a written record of the treatment you want. You can also choose which treatments you do not want, which to limit, and which to stop at a certain time. This includes surgery, medicine, IV fluid, and tube feedings.   Durable power of  for healthcare (DPAHC):  This is a written record that states who you want to make healthcare choices for you when you are unable to make them for yourself. This person, called a proxy, is usually a family member or a friend. You may choose more than 1 proxy.  Do not resuscitate (DNR) order:  A DNR order is used in case your heart stops beating or you stop breathing. It is a request not to have certain forms of treatment, such as CPR. A DNR order may be included in other types of advance directives.  Medical directive:  This covers the care that you want if you are in a coma, near death, or unable to make decisions for yourself. You can list the treatments you want for each condition. Treatment may include pain medicine, surgery, blood transfusions, dialysis, IV or tube feedings, and a ventilator (breathing machine).  Values history:  This document has questions about your views, beliefs, and how you feel and think about life. This information can help others choose the care that you would choose.  Why are advance directives important?  An advance directive helps you control your care. Although spoken wishes may be used, it is better to have your wishes written down. Spoken wishes can be misunderstood, or not followed. Treatments may be given even if you do not want them. An advance directive may make it easier for your family to make difficult choices about your care.   Weight Management   Why  it is important to manage your weight:  Being overweight increases your risk of health conditions such as heart disease, high blood pressure, type 2 diabetes, and certain types of cancer. It can also increase your risk for osteoarthritis, sleep apnea, and other respiratory problems. Aim for a slow, steady weight loss. Even a small amount of weight loss can lower your risk of health problems.  How to lose weight safely:  A safe and healthy way to lose weight is to eat fewer calories and get regular exercise. You can lose up about 1 pound a week by decreasing the number of calories you eat by 500 calories each day.   Healthy meal plan for weight management:  A healthy meal plan includes a variety of foods, contains fewer calories, and helps you stay healthy. A healthy meal plan includes the following:  Eat whole-grain foods more often.  A healthy meal plan should contain fiber. Fiber is the part of grains, fruits, and vegetables that is not broken down by your body. Whole-grain foods are healthy and provide extra fiber in your diet. Some examples of whole-grain foods are whole-wheat breads and pastas, oatmeal, brown rice, and bulgur.  Eat a variety of vegetables every day.  Include dark, leafy greens such as spinach, kale, jonny greens, and mustard greens. Eat yellow and orange vegetables such as carrots, sweet potatoes, and winter squash.   Eat a variety of fruits every day.  Choose fresh or canned fruit (canned in its own juice or light syrup) instead of juice. Fruit juice has very little or no fiber.  Eat low-fat dairy foods.  Drink fat-free (skim) milk or 1% milk. Eat fat-free yogurt and low-fat cottage cheese. Try low-fat cheeses such as mozzarella and other reduced-fat cheeses.  Choose meat and other protein foods that are low in fat.  Choose beans or other legumes such as split peas or lentils. Choose fish, skinless poultry (chicken or turkey), or lean cuts of red meat (beef or pork). Before you cook meat or  poultry, cut off any visible fat.   Use less fat and oil.  Try baking foods instead of frying them. Add less fat, such as margarine, sour cream, regular salad dressing and mayonnaise to foods. Eat fewer high-fat foods. Some examples of high-fat foods include french fries, doughnuts, ice cream, and cakes.  Eat fewer sweets.  Limit foods and drinks that are high in sugar. This includes candy, cookies, regular soda, and sweetened drinks.  Exercise:  Exercise at least 30 minutes per day on most days of the week. Some examples of exercise include walking, biking, dancing, and swimming. You can also fit in more physical activity by taking the stairs instead of the elevator or parking farther away from stores. Ask your healthcare provider about the best exercise plan for you.      © Copyright [employer] 2018 Information is for End User's use only and may not be sold, redistributed or otherwise used for commercial purposes. All illustrations and images included in CareNotes® are the copyrighted property of [employer] or [employer]       Improved

## 2025-04-21 ENCOUNTER — OFFICE VISIT (OUTPATIENT)
Dept: URGENT CARE | Facility: CLINIC | Age: 65
End: 2025-04-21
Payer: MEDICARE

## 2025-04-21 VITALS
OXYGEN SATURATION: 97 % | RESPIRATION RATE: 16 BRPM | BODY MASS INDEX: 26.75 KG/M2 | SYSTOLIC BLOOD PRESSURE: 130 MMHG | TEMPERATURE: 97.8 F | WEIGHT: 151 LBS | HEIGHT: 63 IN | DIASTOLIC BLOOD PRESSURE: 80 MMHG | HEART RATE: 89 BPM

## 2025-04-21 DIAGNOSIS — L03.211 CELLULITIS OF FACE: ICD-10-CM

## 2025-04-21 DIAGNOSIS — S00.262A INSECT BITE OF LEFT EYELID, INITIAL ENCOUNTER: Primary | ICD-10-CM
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
## 2025-04-21 PROCEDURE — G0463 HOSPITAL OUTPT CLINIC VISIT: HCPCS | Performed by: PHYSICIAN ASSISTANT

## 2025-04-21 PROCEDURE — 99213 OFFICE O/P EST LOW 20 MIN: CPT | Performed by: PHYSICIAN ASSISTANT

## 2025-04-21 RX ORDER — CLINDAMYCIN HYDROCHLORIDE 300 MG/1
300 CAPSULE ORAL 4 TIMES DAILY
Qty: 40 CAPSULE | Refills: 0 | Status: SHIPPED | OUTPATIENT
Start: 2025-04-21 | End: 2025-05-01

## 2025-04-21 NOTE — PROGRESS NOTES
"[facility]        NAME: Jada Hui is a 64 y.o. female  : 1960    MRN: [id number]  DATE: 2025  TIME: 11:52 AM    /80 (BP Location: Right arm, Patient Position: Sitting, Cuff Size: Standard)   Pulse 89   Temp 97.8 °F (36.6 °C) (Tympanic)   Resp 16   Ht 5' 3\" (1.6 m)   Wt 68.5 kg (151 lb)   SpO2 97%   BMI 26.75 kg/m²     Assessment and Plan   Insect bite of left eyelid, initial encounter [S00.262A, W57.XXXA]  1. Insect bite of left eyelid, initial encounter  clindamycin (CLEOCIN) 300 MG capsule      2. Cellulitis of face  clindamycin (CLEOCIN) 300 MG capsule            Patient Instructions       Follow up with PCP in 3-5 days.  Proceed to  ER if symptoms worsen.    Chief Complaint     Chief Complaint   Patient presents with    Rash     Pt reports left side facial rash superior to left eye with onset yesterday. C/o progression since. C/o itching. Pt reports possible insect bite. Managing with anti itch cream and Benadryl.          History of Present Illness       Pt with itching rash and tenderness to left eyelid and temple area  no blisters , pt thinks insect bite    Rash        Review of Systems   Review of Systems   Constitutional: Negative.    HENT: Negative.     Eyes: Negative.    Respiratory: Negative.     Cardiovascular: Negative.    Gastrointestinal: Negative.    Endocrine: Negative.    Genitourinary: Negative.    Musculoskeletal: Negative.    Skin:  Positive for rash.   Allergic/Immunologic: Negative.    Neurological: Negative.    Hematological: Negative.    Psychiatric/Behavioral: Negative.     All other systems reviewed and are negative.        Current Medications       Current Outpatient Medications:     acetaminophen (TYLENOL) 650 mg CR tablet, Take 1,300 mg by mouth 2 (two) times a day, Disp: , Rfl:     albuterol (ProAir HFA) 90 mcg/act inhaler, Inhale 2 puffs every 6 (six) hours as needed for wheezing, Disp: 18 g, Rfl: 1    Alcohol Swabs (Alcohol Prep) PADS, Use " in the morning, Disp: 100 each, Rfl: 3    atorvastatin (LIPITOR) 40 mg tablet, Take 1 tablet (40 mg total) by mouth daily, Disp: 90 tablet, Rfl: 1    b complex vitamins capsule, Take 1 capsule by mouth daily, Disp: , Rfl:     BIOTIN BEAUTY EXTRA STRENGTH PO, Take by mouth, Disp: , Rfl:     Blood Glucose Monitoring Suppl (ONE TOUCH ULTRA 2) w/Device KIT, Test once daily, Disp: 1 kit, Rfl: 0    clindamycin (CLEOCIN) 300 MG capsule, Take 1 capsule (300 mg total) by mouth 4 (four) times a day for 10 days, Disp: 40 capsule, Rfl: 0    Empagliflozin-linaGLIPtin (Glyxambi) 25-5 MG TABS, 1 tab daily, Disp: 90 tablet, Rfl: 1    EPINEPHrine (EPIPEN) 0.3 mg/0.3 mL SOAJ, INJECT 0.3 ML INTO A MUSCLE ONCE FOR 1 DOSE, Disp: 2 each, Rfl: 0    gabapentin (NEURONTIN) 300 mg capsule, Take 1 capsule (300 mg total) by mouth daily at bedtime, Disp: 90 capsule, Rfl: 1    glimepiride (AMARYL) 4 mg tablet, TAKE 1 TABLET TWICE A DAY, Disp: 180 tablet, Rfl: 1    glucose blood (OneTouch Ultra) test strip, USE TO TEST ONCE DAILY, Disp: 100 strip, Rfl: 1    Lancets (onetouch ultrasoft) lancets, Test once daily, Disp: 100 each, Rfl: 3    metFORMIN (GLUCOPHAGE) 1000 MG tablet, Take 1 tablet (1,000 mg total) by mouth 2 (two) times a day with meals, Disp: 180 tablet, Rfl: 3    Multiple Vitamin (MULTIVITAMIN ADULT PO), Take 1 tablet by mouth daily, Disp: , Rfl:     omega-3-acid ethyl esters (LOVAZA) 1 g capsule, Take 2 capsules (2 g total) by mouth 2 (two) times a day, Disp: 120 capsule, Rfl: 2    omeprazole (PriLOSEC) 40 MG capsule, Take 1 capsule (40 mg total) by mouth daily, Disp: 90 capsule, Rfl: 1    pioglitazone (ACTOS) 15 mg tablet, Take 1 tablet (15 mg total) by mouth daily, Disp: 30 tablet, Rfl: 2    TURMERIC PO, Take 500 mg by mouth daily, Disp: , Rfl:     meloxicam (Mobic) 15 mg tablet, Take 1 tablet (15 mg total) by mouth daily (Patient not taking: Reported on 4/21/2025), Disp: 90 tablet, Rfl: 1    Symbicort 160-4.5 MCG/ACT inhaler, INHALE  2 PUFFS BY MOUTH 2 TIMES A DAY RINSE MOUTH AFTER USE. (Patient not taking: Reported on 2025), Disp: 30.6 g, Rfl: 2    Current Allergies     Allergies as of 2025 - Reviewed 2025   Allergen Reaction Noted    Shellfish-derivedproducts [shellfish-derived products - food allergy] Anaphylaxis 10/18/2018    Phenytoin Hives 1985    Shellfish allergy - food allergy Other (See Comments) 2022            The following portions of the patient's history were reviewed and updated as appropriate: allergies, current medications, past family history, past medical history, past social history, past surgical history and problem list.     Past Medical History:   Diagnosis Date    Anemia     Due to heavy menstruation    Arthritis     Asthma     Seasonal    BRCA1 negative     BRCA2 negative     Diabetes mellitus (HCC)     GERD (gastroesophageal reflux disease)     Could be due to gastroparesis    Hyperlipidemia     Hypertension 2005    Pneumonia     Walking pneumonia    TIA (transient ischemic attack)        Past Surgical History:   Procedure Laterality Date    ADENOIDECTOMY  1965    BACK SURGERY      cervical and lumbar    CHOLECYSTECTOMY      COLONOSCOPY      [place] BIOPSY SOFT TISSUE BACK/FLANK DEEP N/A 2024    Procedure: EXCISION MASS BACK;  Surgeon: Ottoniel Lake MD;  Location:  MAIN OR;  Service: General    SPINE SURGERY  ,  and     ACDF , Laminectomy  and fusion     TONSILLECTOMY  1965    TUBAL LIGATION         Family History   Problem Relation Age of Onset    Cancer Mother         Unknown    Clotting disorder Mother         Factor XI deficiency    Coronary artery disease Father          at 52 years    Diabetes Father     Hypertension Father     Diabetes type II Father     BRCA2 Negative Sister     BRCA1 Negative Sister     Breast cancer additional onset Sister 55    Breast cancer Sister         Breast Cancer twice (5 years apart)    Cancer  "Sister         Breast cancer twice    COPD Sister     No Known Problems Daughter     No Known Problems Daughter     No Known Problems Daughter     No Known Problems Daughter     No Known Problems Maternal Grandmother     No Known Problems Maternal Grandfather     No Known Problems Paternal Grandmother     No Known Problems Paternal Grandfather     Coronary artery disease Brother         Congestive Heart Failure    Heart failure Brother     Hypertension Brother     No Known Problems Maternal Aunt     Breast cancer Paternal Aunt         60's    Cancer Paternal Aunt         Breast cancer    Diabetes Paternal Aunt     Hypertension Paternal Aunt     Heart failure Paternal Uncle     Hypertension Paternal Uncle     Heart failure Paternal Uncle     Hypertension Paternal Uncle     Diabetes type II Family         Daughter    Hypothyroidism Family         Daughter         Medications have been verified.        Objective   /80 (BP Location: Right arm, Patient Position: Sitting, Cuff Size: Standard)   Pulse 89   Temp 97.8 °F (36.6 °C) (Tympanic)   Resp 16   Ht 5' 3\" (1.6 m)   Wt 68.5 kg (151 lb)   SpO2 97%   BMI 26.75 kg/m²        Physical Exam     Physical Exam  Vitals and nursing note reviewed.   Constitutional:       Appearance: Normal appearance. She is normal weight.   HENT:      Head: Normocephalic and atraumatic.      Right Ear: Tympanic membrane, ear canal and external ear normal.      Left Ear: Tympanic membrane, ear canal and external ear normal.      Nose: Nose normal.      Mouth/Throat:      Mouth: Mucous membranes are moist.      Pharynx: Oropharynx is clear.   Eyes:      Extraocular Movements: Extraocular movements intact.      Conjunctiva/sclera: Conjunctivae normal.      Pupils: Pupils are equal, round, and reactive to light.   Cardiovascular:      Rate and Rhythm: Normal rate and regular rhythm.      Pulses: Normal pulses.      Heart sounds: Normal heart sounds.   Pulmonary:      Effort: Pulmonary " effort is normal.      Breath sounds: Normal breath sounds.   Abdominal:      General: Bowel sounds are normal.      Palpations: Abdomen is soft.   Musculoskeletal:         General: Normal range of motion.      Cervical back: Normal range of motion and neck supple.   Skin:     General: Skin is warm.      Capillary Refill: Capillary refill takes less than 2 seconds.      Comments: Left upper lid erythema and slight tenderness and slight swelling  1 cm erythema facial  no blisters no scabbed areas    Neurological:      General: No focal deficit present.      Mental Status: She is alert and oriented to person, place, and time.   Psychiatric:         Mood and Affect: Mood normal.

## 2025-04-22 ENCOUNTER — HOSPITAL ENCOUNTER (EMERGENCY)
Facility: HOSPITAL | Age: 65
Discharge: HOME | End: 2025-04-22
Attending: EMERGENCY MEDICINE
Payer: MEDICARE

## 2025-04-22 ENCOUNTER — APPOINTMENT (EMERGENCY)
Dept: CT IMAGING | Facility: HOSPITAL | Age: 65
End: 2025-04-22
Payer: MEDICARE

## 2025-04-22 ENCOUNTER — TELEPHONE (OUTPATIENT)
Age: 65
End: 2025-04-22

## 2025-04-22 VITALS
OXYGEN SATURATION: 95 % | HEART RATE: 87 BPM | DIASTOLIC BLOOD PRESSURE: 61 MMHG | TEMPERATURE: 98.2 F | RESPIRATION RATE: 15 BRPM | SYSTOLIC BLOOD PRESSURE: 132 MMHG

## 2025-04-22 DIAGNOSIS — L03.211 FACIAL CELLULITIS: Primary | ICD-10-CM

## 2025-04-22 LAB
ALBUMIN SERPL BCG-MCNC: 4.5 G/DL (ref 3.5–5)
ALP SERPL-CCNC: 121 U/L (ref 34–104)
ALT SERPL W P-5'-P-CCNC: 28 U/L (ref 7–52)
ANION GAP SERPL CALCULATED.3IONS-SCNC: 8 MMOL/L (ref 4–13)
APTT PPP: 32 SECONDS (ref 23–34)
AST SERPL W P-5'-P-CCNC: 23 U/L (ref 13–39)
ATRIAL RATE: 95 BPM
BASOPHILS # BLD AUTO: 0.06 THOUSANDS/ÂΜL (ref 0–0.1)
BASOPHILS NFR BLD AUTO: 1 % (ref 0–1)
BILIRUB SERPL-MCNC: 0.34 MG/DL (ref 0.2–1)
BUN SERPL-MCNC: 19 MG/DL (ref 5–25)
CALCIUM SERPL-MCNC: 9.3 MG/DL (ref 8.4–10.2)
CHLORIDE SERPL-SCNC: 106 MMOL/L (ref 96–108)
CO2 SERPL-SCNC: 24 MMOL/L (ref 21–32)
CREAT SERPL-MCNC: 0.78 MG/DL (ref 0.6–1.3)
EOSINOPHIL # BLD AUTO: 0.38 THOUSAND/ÂΜL (ref 0–0.61)
EOSINOPHIL NFR BLD AUTO: 6 % (ref 0–6)
ERYTHROCYTE [DISTWIDTH] IN BLOOD BY AUTOMATED COUNT: 12.7 % (ref 11.6–15.1)
GFR SERPL CREATININE-BSD FRML MDRD: 79 ML/MIN/1.73SQ M
GLUCOSE SERPL-MCNC: 135 MG/DL (ref 65–140)
HCT VFR BLD AUTO: 43.2 % (ref 34.8–46.1)
HGB BLD-MCNC: 13.8 G/DL (ref 11.5–15.4)
IMM GRANULOCYTES # BLD AUTO: 0.04 THOUSAND/UL (ref 0–0.2)
IMM GRANULOCYTES NFR BLD AUTO: 1 % (ref 0–2)
INR PPP: 0.8 (ref 0.85–1.19)
LACTATE SERPL-SCNC: 1.5 MMOL/L (ref 0.5–2)
LACTATE SERPL-SCNC: 2.1 MMOL/L (ref 0.5–2)
LYMPHOCYTES # BLD AUTO: 2.42 THOUSANDS/ÂΜL (ref 0.6–4.47)
LYMPHOCYTES NFR BLD AUTO: 35 % (ref 14–44)
MCH RBC QN AUTO: 30.4 PG (ref 26.8–34.3)
MCHC RBC AUTO-ENTMCNC: 31.9 G/DL (ref 31.4–37.4)
MCV RBC AUTO: 95 FL (ref 82–98)
MONOCYTES # BLD AUTO: 0.44 THOUSAND/ÂΜL (ref 0.17–1.22)
MONOCYTES NFR BLD AUTO: 6 % (ref 4–12)
NEUTROPHILS # BLD AUTO: 3.6 THOUSANDS/ÂΜL (ref 1.85–7.62)
NEUTS SEG NFR BLD AUTO: 51 % (ref 43–75)
NRBC BLD AUTO-RTO: 0 /100 WBCS
P AXIS: 46 DEGREES
PLATELET # BLD AUTO: 217 THOUSANDS/UL (ref 149–390)
PMV BLD AUTO: 10.1 FL (ref 8.9–12.7)
POTASSIUM SERPL-SCNC: 4.8 MMOL/L (ref 3.5–5.3)
PR INTERVAL: 154 MS
PROCALCITONIN SERPL-MCNC: <0.05 NG/ML
PROT SERPL-MCNC: 7.3 G/DL (ref 6.4–8.4)
PROTHROMBIN TIME: 11.6 SECONDS (ref 12.3–15)
QRS AXIS: 20 DEGREES
QRSD INTERVAL: 78 MS
QT INTERVAL: 350 MS
QTC INTERVAL: 440 MS
RBC # BLD AUTO: 4.54 MILLION/UL (ref 3.81–5.12)
SODIUM SERPL-SCNC: 138 MMOL/L (ref 135–147)
T WAVE AXIS: 40 DEGREES
VENTRICULAR RATE: 95 BPM
WBC # BLD AUTO: 6.94 THOUSAND/UL (ref 4.31–10.16)

## 2025-04-22 PROCEDURE — 70487 CT MAXILLOFACIAL W/DYE: CPT

## 2025-04-22 PROCEDURE — 93005 ELECTROCARDIOGRAM TRACING: CPT

## 2025-04-22 PROCEDURE — 83605 ASSAY OF LACTIC ACID: CPT

## 2025-04-22 PROCEDURE — 85730 THROMBOPLASTIN TIME PARTIAL: CPT

## 2025-04-22 PROCEDURE — 80053 COMPREHEN METABOLIC PANEL: CPT

## 2025-04-22 PROCEDURE — 99284 EMERGENCY DEPT VISIT MOD MDM: CPT

## 2025-04-22 PROCEDURE — 96361 HYDRATE IV INFUSION ADD-ON: CPT

## 2025-04-22 PROCEDURE — 96374 THER/PROPH/DIAG INJ IV PUSH: CPT

## 2025-04-22 PROCEDURE — 93010 ELECTROCARDIOGRAM REPORT: CPT | Performed by: INTERNAL MEDICINE

## 2025-04-22 PROCEDURE — 36415 COLL VENOUS BLD VENIPUNCTURE: CPT

## 2025-04-22 PROCEDURE — 85610 PROTHROMBIN TIME: CPT

## 2025-04-22 PROCEDURE — 85025 COMPLETE CBC W/AUTO DIFF WBC: CPT

## 2025-04-22 PROCEDURE — 84145 PROCALCITONIN (PCT): CPT

## 2025-04-22 PROCEDURE — 99284 EMERGENCY DEPT VISIT MOD MDM: CPT | Performed by: PHYSICIAN ASSISTANT

## 2025-04-22 PROCEDURE — 87040 BLOOD CULTURE FOR BACTERIA: CPT

## 2025-04-22 RX ORDER — ACETAMINOPHEN 10 MG/ML
1000 INJECTION, SOLUTION INTRAVENOUS ONCE
Status: COMPLETED | OUTPATIENT
Start: 2025-04-22 | End: 2025-04-22

## 2025-04-22 RX ADMIN — SODIUM CHLORIDE 1000 ML: 0.9 INJECTION, SOLUTION INTRAVENOUS at 15:03

## 2025-04-22 RX ADMIN — ACETAMINOPHEN 1000 MG: 1000 INJECTION, SOLUTION INTRAVENOUS at 15:08

## 2025-04-22 RX ADMIN — IOHEXOL 65 ML: 350 INJECTION, SOLUTION INTRAVENOUS at 16:37

## 2025-04-22 NOTE — TELEPHONE ENCOUNTER
Pt called in stated she was at  yesterday for bug bite. Wanted to follow up today with Dr Goncalves. Offered appt for tomorrow. Pt state she feels it got worse over night. Woke up with eye swollen and a headache, pain going down her neck. Did not want to wait for tomorrow asked for a call back from Dr Goncalves and to keep her in mind if anyone cancels today.

## 2025-04-22 NOTE — ED PROVIDER NOTES
Time reflects when diagnosis was documented in both MDM as applicable and the Disposition within this note       Time User Action Codes Description Comment    4/22/2025  5:15 PM Ayaka Alberts Add [L03.211] Facial cellulitis           ED Disposition       ED Disposition   Discharge    Condition   Stable    Date/Time   Tue Apr 22, 2025  5:15 PM    Comment   Jada MAXWELL Dontrellfrancisco javier discharge to home/self care.                   Assessment & Plan       Medical Decision Making  Patient with left eye swelling, erythema, will order labs, CT scan to r/o preseptal cellulitis.  Patient with facial cellulitis, instructed patient to continue clindamycin with close f/u with PCP.  Return precautions given.     Amount and/or Complexity of Data Reviewed  Radiology: ordered.    Risk  Prescription drug management.             Medications   sodium chloride 0.9 % bolus 1,000 mL (0 mL Intravenous Stopped 4/22/25 1603)   acetaminophen (Ofirmev) injection 1,000 mg (0 mg Intravenous Stopped 4/22/25 1523)   iohexol (OMNIPAQUE) 350 MG/ML injection (MULTI-DOSE) 100 mL (65 mL Intravenous Given 4/22/25 1637)       ED Risk Strat Scores                    No data recorded                            History of Present Illness       Chief Complaint   Patient presents with    Facial Swelling     On Sunday pt noticed a small amount of swelling around left eye, seen at urgent care and giving antibiotics, pt says its worsen.        Past Medical History:   Diagnosis Date    Anemia 2008    Due to heavy menstruation    Arthritis     Asthma 2005    Seasonal    BRCA1 negative     BRCA2 negative     Diabetes mellitus (HCC) 2006    GERD (gastroesophageal reflux disease) 2008    Could be due to gastroparesis    Hyperlipidemia     Hypertension 2005    Pneumonia 2018    Walking pneumonia    TIA (transient ischemic attack)       Past Surgical History:   Procedure Laterality Date    ADENOIDECTOMY  1965    BACK SURGERY      cervical and lumbar    CHOLECYSTECTOMY  2006     COLONOSCOPY      AR BIOPSY SOFT TISSUE BACK/FLANK DEEP N/A 2024    Procedure: EXCISION MASS BACK;  Surgeon: Ottoniel Lake MD;  Location: [facility];  Service: General    SPINE SURGERY  ,  and     ACDF , Laminectomy  and fusion     TONSILLECTOMY  1965    TUBAL LIGATION        Family History   Problem Relation Age of Onset    Cancer Mother         Unknown    Clotting disorder Mother         Factor XI deficiency    Coronary artery disease Father          at 52 years    Diabetes Father     Hypertension Father     Diabetes type II Father     BRCA2 Negative Sister     BRCA1 Negative Sister     Breast cancer additional onset Sister 55    Breast cancer Sister         Breast Cancer twice (5 years apart)    Cancer Sister         Breast cancer twice    COPD Sister     No Known Problems Daughter     No Known Problems Daughter     No Known Problems Daughter     No Known Problems Daughter     No Known Problems Maternal Grandmother     No Known Problems Maternal Grandfather     No Known Problems Paternal Grandmother     No Known Problems Paternal Grandfather     Coronary artery disease Brother         Congestive Heart Failure    Heart failure Brother     Hypertension Brother     No Known Problems Maternal Aunt     Breast cancer Paternal Aunt         60's    Cancer Paternal Aunt         Breast cancer    Diabetes Paternal Aunt     Hypertension Paternal Aunt     Heart failure Paternal Uncle     Hypertension Paternal Uncle     Heart failure Paternal Uncle     Hypertension Paternal Uncle     Diabetes type II Family         Daughter    Hypothyroidism Family         Daughter      Social History     Tobacco Use    Smoking status: Former     Current packs/day: 0.00     Average packs/day: 0.5 packs/day for 2.0 years (1.0 ttl pk-yrs)     Types: Cigarettes     Start date: 1983     Quit date: 1985     Years since quittin.3     Passive exposure: Past    Smokeless tobacco: Never    Tobacco  comments:      years ago   Vaping Use    Vaping status: Never Used   Substance Use Topics    Alcohol use: Yes     Comment: Occasional    Drug use: Never      E-Cigarette/Vaping    E-Cigarette Use Never User       E-Cigarette/Vaping Substances    Nicotine No     THC No     CBD No     Flavoring No     Other No     Unknown No       I have reviewed and agree with the history as documented.     Patient is a 63 y/o F that presents to the ED with left orbital swelling x 2 days.  She states she was working in the garden and unsure if she was bitten by something.  SHe states she was seen at urgent care yesterday and started on clindamycin.  She has been taking zyrtec for the swelling.  She states this morning when she woke up the swelling was worse and the pain travelled down her face and she has had a headache.  No fevers/chills. NO trouble breathing or swallowing.        History provided by:  Patient      Review of Systems   Constitutional:  Negative for chills and fever.   HENT:  Positive for facial swelling. Negative for trouble swallowing and voice change.    Eyes:  Negative for visual disturbance.   Respiratory:  Negative for cough and shortness of breath.    Cardiovascular:  Negative for chest pain.   Gastrointestinal:  Negative for abdominal pain, nausea and vomiting.   Musculoskeletal:  Negative for neck stiffness.   Skin:  Positive for color change.   Neurological:  Positive for headaches. Negative for dizziness, facial asymmetry, speech difficulty and weakness.   Psychiatric/Behavioral:  Negative for confusion.    All other systems reviewed and are negative.          Objective       ED Triage Vitals   Temperature Pulse Blood Pressure Respirations SpO2 Patient Position - Orthostatic VS   04/22/25 1310 04/22/25 1310 04/22/25 1310 04/22/25 1310 04/22/25 1310 --   98.2 °F (36.8 °C) 100 (!) 176/102 18 98 %       Temp src Heart Rate Source BP Location FiO2 (%) Pain Score    -- 04/22/25 1515 -- -- --     Monitor          Vitals      Date and Time Temp Pulse SpO2 Resp BP Pain Score FACES Pain Rating User   04/22/25 1700 -- 87 95 % 15 132/61 -- -- RN   04/22/25 1600 -- 79 97 % 16 129/66 -- -- RN   04/22/25 1530 -- 86 97 % 18 132/66 -- -- RN   04/22/25 1515 -- 84 98 % 18 133/74 -- -- RN   04/22/25 1310 98.2 °F (36.8 °C) 100 98 % 18 176/102 -- -- LIVE            Physical Exam  Vitals and nursing note reviewed.   Constitutional:       General: She is not in acute distress.     Appearance: Normal appearance. She is well-developed and well-groomed. She is not ill-appearing or diaphoretic.   HENT:      Head: Normocephalic and atraumatic.      Right Ear: External ear normal.      Left Ear: External ear normal.      Nose: Nose normal.      Mouth/Throat:      Mouth: Mucous membranes are moist.      Pharynx: Oropharynx is clear. No uvula swelling.      Comments: Rosacea to face.   Eyes:      Extraocular Movements: Extraocular movements intact.      Right eye: Normal extraocular motion.      Left eye: Normal extraocular motion.      Conjunctiva/sclera: Conjunctivae normal.      Pupils: Pupils are equal, round, and reactive to light.      Comments: Swelling and erythema to left orbit.     Cardiovascular:      Rate and Rhythm: Normal rate and regular rhythm.      Heart sounds: Normal heart sounds.   Pulmonary:      Effort: Pulmonary effort is normal.      Breath sounds: Normal breath sounds. No wheezing, rhonchi or rales.   Musculoskeletal:         General: Normal range of motion.      Cervical back: Normal range of motion and neck supple.   Lymphadenopathy:      Cervical: No cervical adenopathy.      Right cervical: No posterior cervical adenopathy.     Left cervical: No posterior cervical adenopathy.   Skin:     General: Skin is warm and dry.      Findings: Erythema (left orbit) present.   Neurological:      General: No focal deficit present.      Mental Status: She is alert and oriented to person, place, and time.      Motor: No weakness.    Psychiatric:         Behavior: Behavior is cooperative.         Results Reviewed       Procedure Component Value Units Date/Time    Lactic acid 2 Hours [[id number]]  (Normal) Collected: 04/22/25 1501    Lab Status: Final result Specimen: Blood from Arm, Left Updated: 04/22/25 1527     LACTIC ACID 1.5 mmol/L     Narrative:      Result may be elevated if tourniquet was used during collection.    Blood culture #1 [[id number]] Collected: 04/22/25 1501    Lab Status: In process Specimen: Blood from Arm, Left Updated: 04/22/25 1508    APTT [[id number]]  (Normal) Collected: 04/22/25 1316    Lab Status: Final result Specimen: Blood from Arm, Right Updated: 04/22/25 1418     PTT 32 seconds     Protime-INR [[id number]]  (Abnormal) Collected: 04/22/25 1316    Lab Status: Final result Specimen: Blood from Arm, Right Updated: 04/22/25 1418     Protime 11.6 seconds      INR 0.80    Narrative:      INR Therapeutic Range    Indication                                             INR Range      Atrial Fibrillation                                               2.0-3.0  Hypercoagulable State                                    2.0.2.3  Left Ventricular Asist Device                            2.0-3.0  Mechanical Heart Valve                                  -    Aortic(with afib, MI, embolism, HF, LA enlargement,    and/or coagulopathy)                                     2.0-3.0 (2.5-3.5)     Mitral                                                             2.5-3.5  Prosthetic/Bioprosthetic Heart Valve               2.0-3.0  Venous thromboembolism (VTE: VT, PE        2.0-3.0    Procalcitonin [[id number]]  (Normal) Collected: 04/22/25 1316    Lab Status: Final result Specimen: Blood from Arm, Right Updated: 04/22/25 1348     Procalcitonin <0.05 ng/ml     Lactic acid, plasma (w/reflex if result > 2.0) [[id number]]  (Abnormal) Collected: 04/22/25 1316    Lab Status: Final result Specimen: Blood from Arm, Right Updated: 04/22/25 1338     LACTIC  ACID 2.1 mmol/L     Narrative:      Result may be elevated if tourniquet was used during collection.    Comprehensive metabolic panel [[id number]]  (Abnormal) Collected: 04/22/25 1316    Lab Status: Final result Specimen: Blood from Arm, Right Updated: 04/22/25 1338     Sodium 138 mmol/L      Potassium 4.8 mmol/L      Chloride 106 mmol/L      CO2 24 mmol/L      ANION GAP 8 mmol/L      BUN 19 mg/dL      Creatinine 0.78 mg/dL      Glucose 135 mg/dL      Calcium 9.3 mg/dL      AST 23 U/L      ALT 28 U/L      Alkaline Phosphatase 121 U/L      Total Protein 7.3 g/dL      Albumin 4.5 g/dL      Total Bilirubin 0.34 mg/dL      eGFR 79 ml/min/1.73sq m     Narrative:      National Kid[employer] Foundation guidelines for Chronic Kidney Disease (CKD):     Stage 1 with normal or high GFR (GFR > 90 mL/min/1.73 square meters)    Stage 2 Mild CKD (GFR = 60-89 mL/min/1.73 square meters)    Stage 3A Moderate CKD (GFR = 45-59 mL/min/1.73 square meters)    Stage 3B Moderate CKD (GFR = 30-44 mL/min/1.73 square meters)    Stage 4 Severe CKD (GFR = 15-29 mL/min/1.73 square meters)    Stage 5 End Stage CKD (GFR <15 mL/min/1.73 square meters)  Note: GFR calculation is accurate only with a steady state creatinine    CBC and differential [[id number]] Collected: 04/22/25 1316    Lab Status: Final result Specimen: Blood from Arm, Right Updated: 04/22/25 1323     WBC 6.94 Thousand/uL      RBC 4.54 Million/uL      Hemoglobin 13.8 g/dL      Hematocrit 43.2 %      MCV 95 fL      MCH 30.4 pg      MCHC 31.9 g/dL      RDW 12.7 %      MPV 10.1 fL      Platelets 217 Thousands/uL      nRBC 0 /100 WBCs      Segmented % 51 %      Immature Grans % 1 %      Lymphocytes % 35 %      Monocytes % 6 %      Eosinophils Relative 6 %      Basophils Relative 1 %      Absolute Neutrophils 3.60 Thousands/µL      Absolute Immature Grans 0.04 Thousand/uL      Absolute Lymphocytes 2.42 Thousands/µL      Absolute Monocytes 0.44 Thousand/µL      Eosinophils Absolute 0.38  Thousand/µL      Basophils Absolute 0.06 Thousands/µL     Blood culture #2 [[id number]] Collected: 25 1316    Lab Status: In process Specimen: Blood from Arm, Right Updated: 25 1321            CT facial bones with contrast   Final Interpretation by Edgardo Colorado MD ( 170)      Left-sided periorbital soft tissue swelling consistent with cellulitis. No retrobulbar inflammatory changes identified. No discrete collections identified.         Workstation performed: [id number]             Procedures    ED Medication and Procedure Management   Prior to Admission Medications   Prescriptions Last Dose Informant Patient Reported? Taking?   Alcohol Swabs (Alcohol Prep) PADS  Self No No   Sig: Use in the morning   BIOTIN BEAUTY EXTRA STRENGTH PO  Self Yes No   Sig: Take by mouth   Blood Glucose Monitoring Suppl (ONE TOUCH ULTRA 2) w/Device KIT  Self No No   Sig: Test once daily   EPINEPHrine (EPIPEN) 0.3 mg/0.3 mL SOAJ  Self No No   Sig: INJECT 0.3 ML INTO A MUSCLE ONCE FOR 1 DOSE   Empagliflozin-linaGLIPtin (Glyxambi) 25-5 MG TABS  Self No No   Si tab daily   Lancets (onetouch ultrasoft) lancets  Self No No   Sig: Test once daily   Multiple Vitamin (MULTIVITAMIN ADULT PO)  Self Yes No   Sig: Take 1 tablet by mouth daily   Symbicort 160-4.5 MCG/ACT inhaler  Self No No   Sig: INHALE 2 PUFFS BY MOUTH 2 TIMES A DAY RINSE MOUTH AFTER USE.   Patient not taking: Reported on 2025   TURMERIC PO  Self Yes No   Sig: Take 500 mg by mouth daily   acetaminophen (TYLENOL) 650 mg CR tablet  Self Yes No   Sig: Take 1,300 mg by mouth 2 (two) times a day   albuterol (ProAir HFA) 90 mcg/act inhaler  Self No No   Sig: Inhale 2 puffs every 6 (six) hours as needed for wheezing   atorvastatin (LIPITOR) 40 mg tablet  Self No No   Sig: Take 1 tablet (40 mg total) by mouth daily   b complex vitamins capsule  Self Yes No   Sig: Take 1 capsule by mouth daily   clindamycin (CLEOCIN) 300 MG capsule   No No   Sig: Take 1 capsule (300 mg  total) by mouth 4 (four) times a day for 10 days   gabapentin (NEURONTIN) 300 mg capsule  Self No No   Sig: Take 1 capsule (300 mg total) by mouth daily at bedtime   glimepiride (AMARYL) 4 mg tablet  Self No No   Sig: TAKE 1 TABLET TWICE A DAY   glucose blood (OneTouch Ultra) test strip  Self No No   Sig: USE TO TEST ONCE DAILY   meloxicam (Mobic) 15 mg tablet  Self No No   Sig: Take 1 tablet (15 mg total) by mouth daily   Patient not taking: Reported on 4/21/2025   metFORMIN (GLUCOPHAGE) 1000 MG tablet  Self No No   Sig: Take 1 tablet (1,000 mg total) by mouth 2 (two) times a day with meals   omega-3-acid ethyl esters (LOVAZA) 1 g capsule   No No   Sig: Take 2 capsules (2 g total) by mouth 2 (two) times a day   omeprazole (PriLOSEC) 40 MG capsule  Self No No   Sig: Take 1 capsule (40 mg total) by mouth daily   pioglitazone (ACTOS) 15 mg tablet   No No   Sig: Take 1 tablet (15 mg total) by mouth daily      Facility-Administered Medications: None     Discharge Medication List as of 4/22/2025  5:16 PM        CONTINUE these medications which have NOT CHANGED    Details   acetaminophen (TYLENOL) 650 mg CR tablet Take 1,300 mg by mouth 2 (two) times a day, Historical Med      albuterol (ProAir HFA) 90 mcg/act inhaler Inhale 2 puffs every 6 (six) hours as needed for wheezing, Starting Thu 3/28/2024, Normal      Alcohol Swabs (Alcohol Prep) PADS Use in the morning, Starting Thu 7/6/2023, Normal      atorvastatin (LIPITOR) 40 mg tablet Take 1 tablet (40 mg total) by mouth daily, Starting Fri 1/3/2025, Normal      b complex vitamins capsule Take 1 capsule by mouth daily, Historical Med      BIOTIN BEAUTY EXTRA STRENGTH PO Take by mouth, Historical Med      Blood Glucose Monitoring Suppl (ONE TOUCH ULTRA 2) w/Device KIT Test once daily, Normal      clindamycin (CLEOCIN) 300 MG capsule Take 1 capsule (300 mg total) by mouth 4 (four) times a day for 10 days, Starting Mon 4/21/2025, Until Thu 5/1/2025, Normal       Empagliflozin-linaGLIPtin (Glyxambi) 25-5 MG TABS 1 tab daily, Normal      EPINEPHrine (EPIPEN) 0.3 mg/0.3 mL SOAJ INJECT 0.3 ML INTO A MUSCLE ONCE FOR 1 DOSE, Normal      gabapentin (NEURONTIN) 300 mg capsule Take 1 capsule (300 mg total) by mouth daily at bedtime, Starting Mon 1/6/2025, Normal      glimepiride (AMARYL) 4 mg tablet TAKE 1 TABLET TWICE A DAY, Normal      glucose blood (OneTouch Ultra) test strip USE TO TEST ONCE DAILY, Normal      Lancets (onetouch ultrasoft) lancets Test once daily, Normal      meloxicam (Mobic) 15 mg tablet Take 1 tablet (15 mg total) by mouth daily, Starting Mon 1/6/2025, Normal      metFORMIN (GLUCOPHAGE) 1000 MG tablet Take 1 tablet (1,000 mg total) by mouth 2 (two) times a day with meals, Starting Mon 1/6/2025, Normal      Multiple Vitamin (MULTIVITAMIN ADULT PO) Take 1 tablet by mouth daily, Historical Med      omega-3-acid ethyl esters (LOVAZA) 1 g capsule Take 2 capsules (2 g total) by mouth 2 (two) times a day, Starting Thu 3/13/2025, Normal      omeprazole (PriLOSEC) 40 MG capsule Take 1 capsule (40 mg total) by mouth daily, Starting Fri 1/3/2025, Normal      pioglitazone (ACTOS) 15 mg tablet Take 1 tablet (15 mg total) by mouth daily, Starting Thu 3/13/2025, Normal      Symbicort 160-4.5 MCG/ACT inhaler INHALE 2 PUFFS BY MOUTH 2 TIMES A DAY RINSE MOUTH AFTER USE., Normal      TURMERIC PO Take 500 mg by mouth daily, Historical Med           No discharge procedures on file.  ED SEPSIS DOCUMENTATION   Time reflects when diagnosis was documented in both MDM as applicable and the Disposition within this note       Time User Action Codes Description Comment    4/22/2025  5:15 PM Ayaka Alberts [L03.211] Facial cellulitis                  Ayaka Alberts PA-C  04/22/25 9979

## 2025-04-22 NOTE — DISCHARGE INSTRUCTIONS
Warm compresses to face.  Continue antibiotics.  Follow up with PCP in 2-3 days for recheck.  Return to ER if symptoms worsen.

## 2025-04-22 NOTE — TELEPHONE ENCOUNTER
Spokw to Ruby and she will head over to ER as she has swelling in her cheek and is starting to travel down her neck

## 2025-04-27 LAB
BACTERIA BLD CULT: NORMAL
BACTERIA BLD CULT: NORMAL

## 2025-05-12 ENCOUNTER — TELEPHONE (OUTPATIENT)
Age: 65
End: 2025-05-12

## 2025-05-12 NOTE — TELEPHONE ENCOUNTER
"Patient called stating at her last visit she was started on Actos 15 mg daily. She reports that since starting this medication she had gained about 5 lbs, she feels bloated all the time and her ankles are swelling.   Patient does not want to take the Actos anymore and states \"I would be okay going back to Farxiga and Januvia\"  Please advise  "

## 2025-05-13 ENCOUNTER — TELEPHONE (OUTPATIENT)
Dept: ENDOCRINOLOGY | Facility: CLINIC | Age: 65
End: 2025-05-13

## 2025-05-13 DIAGNOSIS — E11.42 TYPE 2 DIABETES MELLITUS WITH DIABETIC POLYNEUROPATHY, WITHOUT LONG-TERM CURRENT USE OF INSULIN (HCC): Primary | ICD-10-CM

## 2025-05-13 RX ORDER — DULAGLUTIDE 0.75 MG/.5ML
INJECTION, SOLUTION SUBCUTANEOUS
Qty: 6 ML | Refills: 2 | Status: SHIPPED | OUTPATIENT
Start: 2025-05-13

## 2025-05-13 RX ORDER — DAPAGLIFLOZIN 10 MG/1
10 TABLET, FILM COATED ORAL DAILY
Qty: 90 TABLET | Refills: 2 | Status: SHIPPED | OUTPATIENT
Start: 2025-05-13

## 2025-05-13 NOTE — TELEPHONE ENCOUNTER
Spoke to patient to discuss side effects with Actos.    We discussed the option of switching her from Glyxambi to Farxiga and Trulicity.  She is agreeable.  We discussed cost could be a potential factor but she says she would not qualify for patient assistance.  Side effects discussed of the Trulicity.  She denies any history of pancreatitis.  Her daughter takes Trulicity.  Injection technique briefly discussed.  She was on Ozempic in the past but stopped because she was unsure if she was taking it correctly and questionable efficacy?    She will reach out in the interim with any issues    Orders placed to [facility]

## 2025-05-22 ENCOUNTER — TELEPHONE (OUTPATIENT)
Dept: ENDOCRINOLOGY | Facility: CLINIC | Age: 65
End: 2025-05-22

## 2025-05-30 DIAGNOSIS — E78.5 HYPERLIPIDEMIA, UNSPECIFIED HYPERLIPIDEMIA TYPE: ICD-10-CM

## 2025-05-30 DIAGNOSIS — K21.00 GASTROESOPHAGEAL REFLUX DISEASE WITH ESOPHAGITIS, UNSPECIFIED WHETHER HEMORRHAGE: ICD-10-CM

## 2025-05-30 RX ORDER — OMEPRAZOLE 40 MG/1
40 CAPSULE, DELAYED RELEASE ORAL DAILY
Qty: 90 CAPSULE | Refills: 1 | Status: SHIPPED | OUTPATIENT
Start: 2025-05-30

## 2025-05-30 RX ORDER — ATORVASTATIN CALCIUM 40 MG/1
40 TABLET, FILM COATED ORAL DAILY
Qty: 90 TABLET | Refills: 1 | Status: SHIPPED | OUTPATIENT
Start: 2025-05-30

## 2025-06-06 ENCOUNTER — TELEPHONE (OUTPATIENT)
Age: 65
End: 2025-06-06

## 2025-06-06 NOTE — TELEPHONE ENCOUNTER
Caller: Patient     Doctor: Flor Boston     Reason for call: Patient in need of an updated script for PT.     Please fax to [facility] at: [phone]         Call back#: [phone]

## 2025-06-09 DIAGNOSIS — M47.816 LUMBAR SPONDYLOSIS: Primary | ICD-10-CM

## 2025-06-09 DIAGNOSIS — M46.1 SACROILIITIS (HCC): ICD-10-CM

## 2025-06-09 DIAGNOSIS — Z98.1 HISTORY OF LUMBAR FUSION: ICD-10-CM

## 2025-06-09 DIAGNOSIS — M96.1 LUMBAR POSTLAMINECTOMY SYNDROME: ICD-10-CM

## 2025-06-13 ENCOUNTER — APPOINTMENT (OUTPATIENT)
Dept: LAB | Facility: CLINIC | Age: 65
End: 2025-06-13
Payer: MEDICARE

## 2025-06-13 DIAGNOSIS — E11.42 TYPE 2 DIABETES MELLITUS WITH DIABETIC POLYNEUROPATHY, WITHOUT LONG-TERM CURRENT USE OF INSULIN (HCC): ICD-10-CM

## 2025-06-13 DIAGNOSIS — E78.5 HYPERLIPIDEMIA, UNSPECIFIED HYPERLIPIDEMIA TYPE: ICD-10-CM

## 2025-06-13 LAB
ALBUMIN SERPL BCG-MCNC: 4.4 G/DL (ref 3.5–5)
ALP SERPL-CCNC: 85 U/L (ref 34–104)
ALT SERPL W P-5'-P-CCNC: 30 U/L (ref 7–52)
ANION GAP SERPL CALCULATED.3IONS-SCNC: 8 MMOL/L (ref 4–13)
AST SERPL W P-5'-P-CCNC: 26 U/L (ref 13–39)
BILIRUB SERPL-MCNC: 0.48 MG/DL (ref 0.2–1)
BUN SERPL-MCNC: 19 MG/DL (ref 5–25)
CALCIUM SERPL-MCNC: 9.1 MG/DL (ref 8.4–10.2)
CHLORIDE SERPL-SCNC: 107 MMOL/L (ref 96–108)
CHOLEST SERPL-MCNC: 130 MG/DL (ref ?–200)
CO2 SERPL-SCNC: 25 MMOL/L (ref 21–32)
CREAT SERPL-MCNC: 0.73 MG/DL (ref 0.6–1.3)
EST. AVERAGE GLUCOSE BLD GHB EST-MCNC: 163 MG/DL
GFR SERPL CREATININE-BSD FRML MDRD: 85 ML/MIN/1.73SQ M
GLUCOSE P FAST SERPL-MCNC: 119 MG/DL (ref 65–99)
HBA1C MFR BLD: 7.3 %
HDLC SERPL-MCNC: 42 MG/DL
LDLC SERPL CALC-MCNC: 47 MG/DL (ref 0–100)
NONHDLC SERPL-MCNC: 88 MG/DL
POTASSIUM SERPL-SCNC: 4.3 MMOL/L (ref 3.5–5.3)
PROT SERPL-MCNC: 6.8 G/DL (ref 6.4–8.4)
SODIUM SERPL-SCNC: 140 MMOL/L (ref 135–147)
TRIGL SERPL-MCNC: 204 MG/DL (ref ?–150)

## 2025-06-13 PROCEDURE — 80061 LIPID PANEL: CPT

## 2025-06-13 PROCEDURE — 83036 HEMOGLOBIN GLYCOSYLATED A1C: CPT

## 2025-06-13 PROCEDURE — 36415 COLL VENOUS BLD VENIPUNCTURE: CPT

## 2025-06-13 PROCEDURE — 80053 COMPREHEN METABOLIC PANEL: CPT

## 2025-06-17 ENCOUNTER — RESULTS FOLLOW-UP (OUTPATIENT)
Dept: ENDOCRINOLOGY | Facility: CLINIC | Age: 65
End: 2025-06-17

## 2025-06-19 DIAGNOSIS — M47.816 LUMBAR SPONDYLOSIS: ICD-10-CM

## 2025-06-19 DIAGNOSIS — E11.42 TYPE 2 DIABETES MELLITUS WITH DIABETIC POLYNEUROPATHY, WITHOUT LONG-TERM CURRENT USE OF INSULIN (HCC): ICD-10-CM

## 2025-06-19 DIAGNOSIS — M54.16 LUMBAR RADICULOPATHY: ICD-10-CM

## 2025-06-19 DIAGNOSIS — Z98.1 HISTORY OF LUMBAR FUSION: ICD-10-CM

## 2025-06-19 DIAGNOSIS — M46.1 SACROILIITIS (HCC): ICD-10-CM

## 2025-06-19 RX ORDER — MELOXICAM 15 MG/1
15 TABLET ORAL DAILY
Qty: 90 TABLET | Refills: 3 | OUTPATIENT
Start: 2025-06-19

## 2025-06-19 RX ORDER — GLIMEPIRIDE 4 MG/1
4 TABLET ORAL 2 TIMES DAILY
Qty: 180 TABLET | Refills: 1 | Status: SHIPPED | OUTPATIENT
Start: 2025-06-19

## 2025-06-20 ENCOUNTER — OFFICE VISIT (OUTPATIENT)
Dept: PAIN MEDICINE | Facility: CLINIC | Age: 65
End: 2025-06-20
Payer: MEDICARE

## 2025-06-20 VITALS
HEIGHT: 63 IN | TEMPERATURE: 97.8 F | OXYGEN SATURATION: 96 % | BODY MASS INDEX: 26.58 KG/M2 | HEART RATE: 92 BPM | WEIGHT: 150 LBS

## 2025-06-20 DIAGNOSIS — G89.29 CHRONIC RIGHT SHOULDER PAIN: ICD-10-CM

## 2025-06-20 DIAGNOSIS — M47.812 CERVICAL SPONDYLOSIS: ICD-10-CM

## 2025-06-20 DIAGNOSIS — M96.1 LUMBAR POSTLAMINECTOMY SYNDROME: ICD-10-CM

## 2025-06-20 DIAGNOSIS — M54.16 LUMBAR RADICULOPATHY: Primary | ICD-10-CM

## 2025-06-20 DIAGNOSIS — M25.511 CHRONIC RIGHT SHOULDER PAIN: ICD-10-CM

## 2025-06-20 PROCEDURE — G2211 COMPLEX E/M VISIT ADD ON: HCPCS | Performed by: PHYSICIAN ASSISTANT

## 2025-06-20 PROCEDURE — 99214 OFFICE O/P EST MOD 30 MIN: CPT | Performed by: PHYSICIAN ASSISTANT

## 2025-06-20 RX ORDER — GABAPENTIN 100 MG/1
100 CAPSULE ORAL 3 TIMES DAILY
Qty: 90 CAPSULE | Refills: 3 | Status: SHIPPED | OUTPATIENT
Start: 2025-06-20

## 2025-06-20 NOTE — PROGRESS NOTES
Name: Jada Hui      : 1960      MRN: [id number]  Encounter Provider: Flor Boston PA-C  Encounter Date: 2025   Enc[facility]  :  Assessment & Plan  Lumbar radiculopathy  While the patient was in the office today, I did have a thorough conversation regarding their chronic pain syndrome, medication management, and treatment plan options.    In order to address the radicular symptoms, I have recommended the patient take the gabapentin more regularly.  At the 300 mg dose it seems that it affects her vision.  On today's visit I have prescribed 100 mg for her to take every evening.  I did inform her that she could take this up to 3 times a day if indicated.    Continue meloxicam as prescribed.    Continue physical therapy as scheduled.    Follow-up is planned in 3 months or sooner as warranted. The patient was advised to contact the office should their symptoms worsen in the interim.    Orders:  •  gabapentin (NEURONTIN) 100 mg capsule; Take 1 capsule (100 mg total) by mouth 3 (three) times a day    Lumbar postlaminectomy syndrome    Orders:  •  gabapentin (NEURONTIN) 100 mg capsule; Take 1 capsule (100 mg total) by mouth 3 (three) times a day    Chronic right shoulder pain  X-ray right shoulder.  Consider Ortho referral.  Orders:  •  XR shoulder 2+ vw right; Future    Cervical spondylosis  I have placed orders for x-ray of the cervical spine on today's visit.  Continue physical therapy as scheduled.    Recommend trigger point injections to address the myofascial component of her pain pattern.    If no response to TPI's, refer patient to Dr. Garrett,      Orders:  •  XR spine cervical complete 4 or 5 vw non injury; Future    My impressions and treatment recommendations were discussed in detail with the patient who verbalized understanding and had no further questions.  Discharge instructions were provided. I personally saw and examined the patient and  "I agree with the above discussed plan of care.    History of Present Illness     Jada Hui is a 65 y.o. female who presents for a follow up office visit in regards to chronic neck pain secondary to cervical spondylosis and chronic low back pain secondary to lumbar postlaminectomy pain syndrome/lumbar spondylosis with radiculopathy. The patient’s current symptoms include persistent pain in the low back with intermittent radiation into the posterior aspect of the right lower extremity in addition to neck pain with radiation into the right shoulder.  Her current pain is rated a 2 out of 10.  She has intermittent numbness and paresthesias in the left foot that wakes her up with associated cramping.  Patient has been attending physical therapy with partial relief.  She admits that she is not taking the gabapentin on a regular basis.  300 mg taken every day causes blurred vision.  She takes meloxicam 3 times a week.  Patient has responded temporarily to lumbar injections.  We have not done any injections for the neck pain.  She has no recent radiological imaging of the cervical spine or shoulder.                      Review of Systems   Respiratory:  Negative for shortness of breath.    Cardiovascular:  Negative for chest pain.   Gastrointestinal:  Negative for constipation, diarrhea, nausea and vomiting.   Musculoskeletal:  Positive for arthralgias, gait problem and joint swelling. Negative for myalgias.   Skin:  Negative for rash.   Neurological:  Positive for weakness. Negative for dizziness and seizures.   Psychiatric/Behavioral:  Negative for dysphoric mood.    All other systems reviewed and are negative.      Medical History Reviewed by provider this encounter:  Tobacco  Allergies  Meds  Problems  Med Hx  Surg Hx  Fam Hx     .   Objective   Pulse 92   Temp 97.8 °F (36.6 °C)   Ht 5' 3\" (1.6 m)   Wt 68 kg (150 lb)   SpO2 96%   BMI 26.57 kg/m²      Pain Score:   8  Physical Exam  Constitutional: normal, " well developed, well nourished, alert, in no distress and non-toxic and no overt pain behavior.  Eyes: anicteric  HEENT: grossly intact  Pulmonary: even and unlabored  Cardiovascular: No edema or pitting edema present  Skin: Normal without rashes or lesions and well hydrated  Psychiatric: Mood and affect appropriate  Neurologic: Cranial Nerves II-XII grossly intact  Musculoskeletal: Stable gait without the use of assistive devices.

## 2025-06-24 ENCOUNTER — OFFICE VISIT (OUTPATIENT)
Dept: ENDOCRINOLOGY | Facility: CLINIC | Age: 65
End: 2025-06-24
Payer: MEDICARE

## 2025-06-24 VITALS
BODY MASS INDEX: 26.22 KG/M2 | HEIGHT: 63 IN | WEIGHT: 148 LBS | OXYGEN SATURATION: 98 % | SYSTOLIC BLOOD PRESSURE: 118 MMHG | DIASTOLIC BLOOD PRESSURE: 70 MMHG | HEART RATE: 65 BPM

## 2025-06-24 DIAGNOSIS — E78.5 HYPERLIPIDEMIA, UNSPECIFIED HYPERLIPIDEMIA TYPE: ICD-10-CM

## 2025-06-24 DIAGNOSIS — I10 HYPERTENSION, UNSPECIFIED TYPE: ICD-10-CM

## 2025-06-24 DIAGNOSIS — E11.42 TYPE 2 DIABETES MELLITUS WITH DIABETIC POLYNEUROPATHY, WITHOUT LONG-TERM CURRENT USE OF INSULIN (HCC): Primary | ICD-10-CM

## 2025-06-24 PROCEDURE — 99214 OFFICE O/P EST MOD 30 MIN: CPT

## 2025-06-24 PROCEDURE — G2211 COMPLEX E/M VISIT ADD ON: HCPCS

## 2025-06-24 NOTE — PROGRESS NOTES
Name: Jada Hui      : 1960      MRN: [id number]  Encounter Provider: ERI Garrison  Encounter Date: 2025   Encounter department: [facility] [place]    Chief Complaint   Patient presents with    Diabetes Type 2     Assessment & Plan  1. Diabetes Mellitus: Stable. A1c improved to 7.3   - Continue current medication regimen without dosage adjustments due to gastrointestinal side effects.  - Follow-up A1c test and metabolic panel around 09/15/2025.    2. Hypertriglyceridemia: Triglycerides significantly decreased since 2025. Currently on Trulicity, Lovaza, and Lipitor, contributing to improvement.  - Continued monitoring and adherence to current medication regimen recommended.  - Another metabolic panel at next follow-up.  3. Hypertension- controlled. Continue regimen  4. Hyperlipidemia- LDL at goal. Continue statin.    Follow-up  - In 3 months.    History of Present Illness  Jada Hui is a 65 y.o. female with type 2 diabetes seen in follow up. Reports complications of TIA. Denies recent severe hypoglycemic or severe hyperglycemic episodes. Denies any issues with her current regimen. Last A1C was 7.3. Denies recent illness, hospitalization or steroid use.      Has shellfish allergy but only to shrimp- able to have crab, lobster      Home blood glucometer readings:   Forgot to bring in meter  Estimates avg around 112-114     Current regimen:   Trulicity 0.75 mg/week  Glimepiride 4 mg BID  Metformin 1000 mg     - The primary reason for this visit is to assess the effectiveness of their current treatment regimen.  - They have been on a new medication regimen for 2.5 weeks, resulting in a decrease in A1c to 7.3.  - They experience mild morning stomach discomfort, which subsides after eating.  - Morning blood glucose levels are typically below 100, rising to 112-120 if they consume starches or carbohydrates at night.  - Previously, morning blood  glucose levels ranged from 150-160.  - Home monitoring averages 112-114, with postprandial evening readings in the 130s.  - Upon waking, levels are often below 100 unless fasting for an extended period.  - Currently on Trulicity and glimepiride.    Hypertriglyceridemia  - They are also on Lovaza and Lipitor for hypertriglyceridemia.  - Cholesterol and triglyceride levels have improved significantly since March, with anticipated continued improvement.    Supplemental information: reports bloating and ankle swelling from [employer].        Last Eye Exam: 10/07/2024  Last Foot Exam: 04/01/2025  Health Maintenance   Topic Date Due    Diabetic Foot Exam  04/01/2026    Diabetic Eye Exam  10/07/2026       Did not tolerate Ozempic- stroke like symptoms   History of gastroparesis- not a GLP1 candidate   Review of Systems   Constitutional:  Negative for chills and fever.   HENT:  Negative for ear pain and sore throat.    Eyes:  Negative for pain and visual disturbance.   Respiratory:  Negative for cough and shortness of breath.    Cardiovascular:  Negative for chest pain and palpitations.   Gastrointestinal:  Negative for abdominal pain and vomiting.   Genitourinary:  Negative for dysuria and hematuria.   Musculoskeletal:  Negative for arthralgias and back pain.   Skin:  Negative for color change and rash.   Neurological:  Negative for seizures and syncope.   All other systems reviewed and are negative.   as per HPI         Medical History Reviewed by provider this encounter:     .          Review of Systems   Constitutional:  Negative for chills and fever.   HENT:  Negative for ear pain and sore throat.    Eyes:  Negative for pain and visual disturbance.   Respiratory:  Negative for cough and shortness of breath.    Cardiovascular:  Negative for chest pain and palpitations.   Gastrointestinal:  Negative for abdominal pain and vomiting.   Genitourinary:  Negative for dysuria and hematuria.   Musculoskeletal:  Negative for  "arthralgias and back pain.   Skin:  Negative for color change and rash.   Neurological:  Negative for seizures and syncope.   All other systems reviewed and are negative.   as per HPI         Medical History Reviewed by provider this encounter:     .    Objective   /70   Pulse 65   Ht 5' 3\" (1.6 m)   Wt 67.1 kg (148 lb)   SpO2 98%   BMI 26.22 kg/m²      Body mass index is 26.22 kg/m².  Wt Readings from Last 3 Encounters:   06/24/25 67.1 kg (148 lb)   06/20/25 68 kg (150 lb)   04/21/25 68.5 kg (151 lb)     Physical Exam  - Lungs clear to auscultation bilaterally  Physical Exam  Vitals reviewed.   HENT:      Head: Normocephalic and atraumatic.     Cardiovascular:      Rate and Rhythm: Normal rate.   Pulmonary:      Effort: Pulmonary effort is normal.     Neurological:      Mental Status: She is alert.       Physical Exam      Results  - Laboratory Studies:    - A1c: 7.3%    - Fasting blood sugar: 119 mg/dL    - Triglycerides: Decreased significantly since March  Labs:   Lab Results   Component Value Date    HGBA1C 7.3 (H) 06/13/2025    HGBA1C 7.7 (H) 03/04/2025    HGBA1C 7.4 (H) 10/07/2024     Lab Results   Component Value Date    CREATININE 0.73 06/13/2025    CREATININE 0.78 04/22/2025    CREATININE 0.77 03/04/2025    BUN 19 06/13/2025    K 4.3 06/13/2025     06/13/2025    CO2 25 06/13/2025     eGFR   Date Value Ref Range Status   06/13/2025 85 ml/min/1.73sq m Final     Lab Results   Component Value Date    HDL 42 (L) 06/13/2025    TRIG 204 (H) 06/13/2025     Lab Results   Component Value Date    ALT 30 06/13/2025    AST 26 06/13/2025    ALKPHOS 85 06/13/2025     Lab Results   Component Value Date    [id number] 2.455 09/23/2023       There are no Patient Instructions on file for this visit.    Discussed with the patient and all questioned fully answered. She will call me if any problems arise.  "

## 2025-06-25 ENCOUNTER — APPOINTMENT (OUTPATIENT)
Dept: RADIOLOGY | Facility: CLINIC | Age: 65
End: 2025-06-25
Payer: MEDICARE

## 2025-06-25 DIAGNOSIS — M47.812 CERVICAL SPONDYLOSIS: ICD-10-CM

## 2025-06-25 DIAGNOSIS — M25.511 CHRONIC RIGHT SHOULDER PAIN: ICD-10-CM

## 2025-06-25 DIAGNOSIS — G89.29 CHRONIC RIGHT SHOULDER PAIN: ICD-10-CM

## 2025-06-25 PROCEDURE — 73030 X-RAY EXAM OF SHOULDER: CPT

## 2025-06-25 PROCEDURE — 72050 X-RAY EXAM NECK SPINE 4/5VWS: CPT

## 2025-06-30 ENCOUNTER — RESULTS FOLLOW-UP (OUTPATIENT)
Dept: PAIN MEDICINE | Facility: CLINIC | Age: 65
End: 2025-06-30

## 2025-07-01 NOTE — TELEPHONE ENCOUNTER
----- Message from ERI Condon sent at 6/30/2025 11:36 AM EDT -----  ADRIANA PATIENT:    Please let patient know Xray C spine showed  C4-C5 foraminal stenosis (moderate right, mild left).  This is above her fusion site at C5-C7.  Xray shoulder showed mild glenohumeral and   acromioclavicular osteoarthritis.     Would recommend continuing PT and f/u with Adriana in July to discuss       ----- Message -----  From: Interface, Radiology Results In  Sent: 6/29/2025   8:04 PM EDT  To: Adriana Boston PA-C

## 2025-07-02 NOTE — TELEPHONE ENCOUNTER
Caller:Jada    Doctor: Dr. Hayes    Reason for call: Patient returning call from nurse please advise     Call back#: [phone]

## 2025-07-17 DIAGNOSIS — E11.42 TYPE 2 DIABETES MELLITUS WITH DIABETIC POLYNEUROPATHY, WITHOUT LONG-TERM CURRENT USE OF INSULIN (HCC): ICD-10-CM

## 2025-07-17 RX ORDER — BLOOD SUGAR DIAGNOSTIC
STRIP MISCELLANEOUS
Qty: 100 EACH | Refills: 1 | Status: SHIPPED | OUTPATIENT
Start: 2025-07-17

## 2025-07-23 ENCOUNTER — OFFICE VISIT (OUTPATIENT)
Dept: PAIN MEDICINE | Facility: CLINIC | Age: 65
End: 2025-07-23
Payer: MEDICARE

## 2025-07-23 VITALS
HEIGHT: 63 IN | HEART RATE: 87 BPM | BODY MASS INDEX: 26.58 KG/M2 | OXYGEN SATURATION: 95 % | WEIGHT: 150 LBS | TEMPERATURE: 97.6 F

## 2025-07-23 DIAGNOSIS — M47.816 LUMBAR SPONDYLOSIS: ICD-10-CM

## 2025-07-23 DIAGNOSIS — Z98.1 HISTORY OF FUSION OF CERVICAL SPINE: ICD-10-CM

## 2025-07-23 DIAGNOSIS — M47.812 CERVICAL SPONDYLOSIS: ICD-10-CM

## 2025-07-23 DIAGNOSIS — M79.18 MYOFASCIAL PAIN SYNDROME: Primary | ICD-10-CM

## 2025-07-23 DIAGNOSIS — M96.1 LUMBAR POSTLAMINECTOMY SYNDROME: ICD-10-CM

## 2025-07-23 PROCEDURE — 99213 OFFICE O/P EST LOW 20 MIN: CPT | Performed by: PHYSICIAN ASSISTANT

## 2025-07-23 PROCEDURE — 20552 NJX 1/MLT TRIGGER POINT 1/2: CPT | Performed by: PHYSICIAN ASSISTANT

## 2025-07-23 RX ORDER — LIDOCAINE HYDROCHLORIDE 20 MG/ML
3 INJECTION, SOLUTION EPIDURAL; INFILTRATION; INTRACAUDAL; PERINEURAL ONCE
Status: COMPLETED | OUTPATIENT
Start: 2025-07-23 | End: 2025-07-23

## 2025-07-23 RX ORDER — METHYLPREDNISOLONE ACETATE 80 MG/ML
40 INJECTION, SUSPENSION INTRA-ARTICULAR; INTRALESIONAL; INTRAMUSCULAR; SOFT TISSUE ONCE
Status: COMPLETED | OUTPATIENT
Start: 2025-07-23 | End: 2025-07-23

## 2025-07-23 RX ADMIN — LIDOCAINE HYDROCHLORIDE 3 ML: 20 INJECTION, SOLUTION EPIDURAL; INFILTRATION; INTRACAUDAL; PERINEURAL at 15:00

## 2025-07-23 RX ADMIN — METHYLPREDNISOLONE ACETATE 40 MG: 80 INJECTION, SUSPENSION INTRA-ARTICULAR; INTRALESIONAL; INTRAMUSCULAR; SOFT TISSUE at 14:59

## 2025-07-23 NOTE — PROGRESS NOTES
Name: Jada Hui      : 1960      MRN: [id number]  Encounter Provider: Flor Boston PA-C  Encounter Date: 2025   Encounter department: [facility] [place]  :  Assessment & Plan  Myofascial pain syndrome  While the patient was in the office today, I did have a thorough conversation regarding their chronic pain syndrome, medication management, and treatment plan options.    After discussing options, the patient is elected to proceed with trigger point injections to address the myofascial component of her pain pattern.    Procedure: Trigger Point Injection x 3.      Procedure Note:  After fully informed written consent was obtained, the above procedure was performed.   Using aseptic technique a 25-gauge needle was placed in the region of the right trapezius muscle, right lumbar paraspinal muscle and right medial glutes.  Approximately 3 cc of 1% Lidocaine and 40 mg of Depo-Medrol was injected in a fan-like fashion after negative aspiration with each cc.  After adequate anesthesia was obtained, the areas were dry-needled.       Complications:  None.      She is aware that injections with steroid may increase blood sugar levels.  Patient's last hemoglobin A1c was below 8.0.  She was advised to contact her provider with any significant elevations.    The patient will continue the current medication regiment without change.    Follow-up in the office as scheduled in September.    Orders:  •  methylPREDNISolone acetate (DEPO-MEDROL) injection 40 mg  •  lidocaine (PF) (XYLOCAINE-MPF) 2 % injection 3 mL    History of fusion of cervical spine         Cervical spondylosis         Lumbar postlaminectomy syndrome         Lumbar spondylosis         My impressions and treatment recommendations were discussed in detail with the patient who verbalized understanding and had no further questions.  Discharge instructions were provided. I personally saw and examined the patient and I agree with  "the above discussed plan of care.    History of Present Illness     Jada Hui is a 65 y.o. female who presents for a follow up office visit in regards to Shoulder Pain and Back Pain. The patient’s current symptoms include right-sided neck pain and right sided low back pain that she rates a 3-4 out of 10 describes both areas as constant and dull, achy in nature.  She has intermittent sharp stabbing pains down the right buttock and lateral right thigh.  Patient has has intermittent referred pain of the the right sided neck pain into the right proximal upper extremity to the level of the elbow.  She presents today for the previously discussed trigger point injections.    Review of Systems   Respiratory:  Negative for shortness of breath.    Cardiovascular:  Negative for chest pain.   Gastrointestinal:  Negative for constipation, diarrhea, nausea and vomiting.   Musculoskeletal:  Negative for arthralgias, gait problem, joint swelling and myalgias.   Skin:  Negative for rash.   Neurological:  Negative for dizziness, seizures and weakness.   Psychiatric/Behavioral:  Negative for dysphoric mood.    All other systems reviewed and are negative.      Medical History Reviewed by provider this encounter:  Tobacco  Allergies  Meds  Problems  Med Hx  Surg Hx  Fam Hx     .     Objective   Pulse 87   Temp 97.6 °F (36.4 °C)   Ht 5' 3\" (1.6 m)   Wt 68 kg (150 lb)   SpO2 95%   BMI 26.57 kg/m²      Pain Score:   4  Physical Exam  Constitutional: normal, well developed, well nourished, alert, in no distress and non-toxic and no overt pain behavior.  Eyes: anicteric  HEENT: grossly intact  Pulmonary: even and unlabored  Cardiovascular: No edema or pitting edema present  Skin: Normal without rashes or lesions and well hydrated  Psychiatric: Mood and affect appropriate  Neurologic: Cranial Nerves II-XII grossly intact  Musculoskeletal: Tenderness to palpation over the right trapezius muscle, tenderness palpation over the " right paraspinal muscles of the lumbar spine, right piriformis.

## (undated) DEVICE — DECANTER: Brand: UNBRANDED

## (undated) DEVICE — SUT MONOCRYL 4-0 PS-2 27 IN Y426H

## (undated) DEVICE — GLOVE INDICATOR PI UNDERGLOVE SZ 6.5 BLUE

## (undated) DEVICE — INTENDED FOR TISSUE SEPARATION, AND OTHER PROCEDURES THAT REQUIRE A SHARP SURGICAL BLADE TO PUNCTURE OR CUT.: Brand: BARD-PARKER SAFETY BLADES SIZE 15, STERILE

## (undated) DEVICE — ADHESIVE SKIN HIGH VISCOSITY EXOFIN 1ML

## (undated) DEVICE — ELECTRODE BLADE MOD E-Z CLEAN  2.75IN 7CM -0012AM

## (undated) DEVICE — GLOVE INDICATOR PI UNDERGLOVE SZ 8 BLUE

## (undated) DEVICE — BETHLEHEM UNIVERSAL OUTPATIENT: Brand: CARDINAL HEALTH

## (undated) DEVICE — GLOVE SRG BIOGEL 6.5

## (undated) DEVICE — NEPTUNE E-SEP SMOKE EVACUATION PENCIL, COATED, 70MM BLADE, PUSH BUTTON SWITCH: Brand: NEPTUNE E-SEP

## (undated) DEVICE — CHLORAPREP HI-LITE 26ML ORANGE

## (undated) DEVICE — GLOVE SRG BIOGEL ECLIPSE 8

## (undated) DEVICE — SUT VICRYL 3-0 SH 27 IN J416H

## (undated) DEVICE — PLUMEPEN PRO 10FT